# Patient Record
Sex: FEMALE | Race: WHITE | Employment: OTHER | URBAN - METROPOLITAN AREA
[De-identification: names, ages, dates, MRNs, and addresses within clinical notes are randomized per-mention and may not be internally consistent; named-entity substitution may affect disease eponyms.]

---

## 2017-01-30 RX ORDER — LISINOPRIL 5 MG/1
TABLET ORAL
Qty: 90 TABLET | Refills: 1 | Status: SHIPPED | OUTPATIENT
Start: 2017-01-30 | End: 2017-07-29 | Stop reason: SDUPTHER

## 2017-01-30 RX ORDER — SIMVASTATIN 40 MG
TABLET ORAL
Qty: 90 TABLET | Refills: 1 | Status: SHIPPED | OUTPATIENT
Start: 2017-01-30 | End: 2017-07-29 | Stop reason: SDUPTHER

## 2017-03-27 RX ORDER — METOPROLOL TARTRATE 50 MG/1
TABLET, FILM COATED ORAL
Qty: 90 TABLET | Refills: 0 | Status: SHIPPED | OUTPATIENT
Start: 2017-03-27 | End: 2017-06-25 | Stop reason: SDUPTHER

## 2017-04-20 RX ORDER — AMLODIPINE BESYLATE 5 MG/1
TABLET ORAL
Qty: 90 TABLET | Refills: 1 | Status: SHIPPED | OUTPATIENT
Start: 2017-04-20 | End: 2017-10-17 | Stop reason: SDUPTHER

## 2017-05-25 ENCOUNTER — HOSPITAL ENCOUNTER (OUTPATIENT)
Age: 67
Setting detail: SPECIMEN
Discharge: HOME | End: 2017-05-25
Payer: MEDICARE

## 2017-05-25 LAB
HEPATITIS C ANTIBODY: NONREACTIVE
HIV AG/AB: NONREACTIVE

## 2017-06-06 ENCOUNTER — OFFICE VISIT (OUTPATIENT)
Dept: FAMILY MEDICINE CLINIC | Age: 67
End: 2017-06-06
Payer: MEDICARE

## 2017-06-06 VITALS
WEIGHT: 212 LBS | SYSTOLIC BLOOD PRESSURE: 110 MMHG | RESPIRATION RATE: 16 BRPM | BODY MASS INDEX: 37.56 KG/M2 | HEIGHT: 63 IN | HEART RATE: 68 BPM | DIASTOLIC BLOOD PRESSURE: 76 MMHG

## 2017-06-06 DIAGNOSIS — D69.6 THROMBOCYTOPENIA (HCC): ICD-10-CM

## 2017-06-06 DIAGNOSIS — R42 DIZZINESS: ICD-10-CM

## 2017-06-06 DIAGNOSIS — R73.01 IMPAIRED FASTING BLOOD SUGAR: ICD-10-CM

## 2017-06-06 DIAGNOSIS — I10 ESSENTIAL HYPERTENSION: Primary | ICD-10-CM

## 2017-06-06 DIAGNOSIS — Z00.00 MEDICARE ANNUAL WELLNESS VISIT, INITIAL: ICD-10-CM

## 2017-06-06 DIAGNOSIS — E78.2 MIXED HYPERLIPIDEMIA: ICD-10-CM

## 2017-06-06 PROCEDURE — G0438 PPPS, INITIAL VISIT: HCPCS | Performed by: FAMILY MEDICINE

## 2017-06-06 PROCEDURE — 99214 OFFICE O/P EST MOD 30 MIN: CPT | Performed by: FAMILY MEDICINE

## 2017-06-06 RX ORDER — PREDNISONE 20 MG/1
TABLET ORAL
Qty: 10 TABLET | Refills: 0 | Status: SHIPPED | OUTPATIENT
Start: 2017-06-06 | End: 2018-03-06 | Stop reason: ALTCHOICE

## 2017-06-06 ASSESSMENT — PATIENT HEALTH QUESTIONNAIRE - PHQ9: SUM OF ALL RESPONSES TO PHQ QUESTIONS 1-9: 1

## 2017-06-06 ASSESSMENT — ANXIETY QUESTIONNAIRES: GAD7 TOTAL SCORE: 0

## 2017-06-11 PROBLEM — Z00.00 MEDICARE ANNUAL WELLNESS VISIT, INITIAL: Status: ACTIVE | Noted: 2017-06-11

## 2017-06-11 ASSESSMENT — ENCOUNTER SYMPTOMS
SINUS PRESSURE: 0
SHORTNESS OF BREATH: 0
SORE THROAT: 0
RHINORRHEA: 0
DIARRHEA: 0
COUGH: 0
EYE DISCHARGE: 0
CONSTIPATION: 0
WHEEZING: 0
EYE REDNESS: 0
TROUBLE SWALLOWING: 0
NAUSEA: 0
ABDOMINAL PAIN: 0
VOMITING: 0

## 2017-06-19 RX ORDER — DICLOFENAC SODIUM AND MISOPROSTOL 75; 200 MG/1; UG/1
TABLET, DELAYED RELEASE ORAL
Qty: 180 TABLET | Refills: 1 | Status: SHIPPED | OUTPATIENT
Start: 2017-06-19 | End: 2021-05-07 | Stop reason: SDUPTHER

## 2017-06-25 DIAGNOSIS — I10 ESSENTIAL HYPERTENSION: Primary | ICD-10-CM

## 2017-06-26 RX ORDER — METOPROLOL TARTRATE 50 MG/1
TABLET, FILM COATED ORAL
Qty: 90 TABLET | Refills: 1 | Status: SHIPPED | OUTPATIENT
Start: 2017-06-26 | End: 2017-12-23 | Stop reason: SDUPTHER

## 2017-07-31 RX ORDER — SIMVASTATIN 40 MG
TABLET ORAL
Qty: 90 TABLET | Refills: 1 | Status: SHIPPED | OUTPATIENT
Start: 2017-07-31 | End: 2018-01-27 | Stop reason: SDUPTHER

## 2017-07-31 RX ORDER — LISINOPRIL 5 MG/1
TABLET ORAL
Qty: 90 TABLET | Refills: 1 | Status: SHIPPED | OUTPATIENT
Start: 2017-07-31 | End: 2018-01-27 | Stop reason: SDUPTHER

## 2017-10-17 RX ORDER — AMLODIPINE BESYLATE 5 MG/1
TABLET ORAL
Qty: 90 TABLET | Refills: 1 | Status: SHIPPED | OUTPATIENT
Start: 2017-10-17 | End: 2018-04-15 | Stop reason: SDUPTHER

## 2017-12-18 NOTE — TELEPHONE ENCOUNTER
Cecilia Bryant called requesting a refill of the below medication which has been pended for you: patient has plenty, and does not want refilled at this time.  Will call in after the first of the year to reschedule appt;.    Requested Prescriptions     Pending Prescriptions Disp Refills    diclofenac-Misoprostol (ARTHROTEC 75) 75-0.2 MG per tablet [Pharmacy Med Name: DICLOFENAC/MISOPROSTOL TB 60S 75/.2MG] 180 tablet 1     Sig: TAKE 1 TABLET TWICE A DAY       Last Appointment Date: 6/6/2017  Next Appointment Date:     No Known Allergies

## 2017-12-19 RX ORDER — DICLOFENAC SODIUM AND MISOPROSTOL 75; 200 MG/1; UG/1
TABLET, DELAYED RELEASE ORAL
Qty: 180 TABLET | Refills: 1 | OUTPATIENT
Start: 2017-12-19

## 2017-12-23 DIAGNOSIS — I10 ESSENTIAL HYPERTENSION: ICD-10-CM

## 2017-12-26 RX ORDER — METOPROLOL TARTRATE 50 MG/1
TABLET, FILM COATED ORAL
Qty: 90 TABLET | Refills: 1 | Status: SHIPPED | OUTPATIENT
Start: 2017-12-26 | End: 2018-06-21 | Stop reason: SDUPTHER

## 2017-12-26 NOTE — TELEPHONE ENCOUNTER
Srinivasan Tian called requesting a refill of the below medication which has been pended for you:     Requested Prescriptions     Pending Prescriptions Disp Refills    metoprolol tartrate (LOPRESSOR) 50 MG tablet [Pharmacy Med Name: METOPROLOL TARTRATE TABS 50MG] 90 tablet 1     Sig: TAKE 1 TABLET DAILY       Last Appointment Date: 6/6/2017  Next Appointment Date: is going to schedule after the first of the year.     No Known Allergies

## 2018-01-23 ENCOUNTER — HOSPITAL ENCOUNTER (OUTPATIENT)
Dept: LAB | Age: 68
Setting detail: SPECIMEN
Discharge: HOME | End: 2018-01-23
Payer: MEDICARE

## 2018-01-23 DIAGNOSIS — R73.01 IMPAIRED FASTING BLOOD SUGAR: ICD-10-CM

## 2018-01-23 DIAGNOSIS — E78.2 MIXED HYPERLIPIDEMIA: ICD-10-CM

## 2018-01-23 DIAGNOSIS — I10 ESSENTIAL HYPERTENSION: ICD-10-CM

## 2018-01-23 LAB
ABSOLUTE EOS #: 0.1 K/UL (ref 0–0.4)
ABSOLUTE IMMATURE GRANULOCYTE: ABNORMAL K/UL (ref 0–0.3)
ABSOLUTE LYMPH #: 1.1 K/UL (ref 1–4.8)
ABSOLUTE MONO #: 0.4 K/UL (ref 0.1–1.2)
ALBUMIN SERPL-MCNC: 3.9 G/DL (ref 3.5–5.2)
ALBUMIN/GLOBULIN RATIO: 1.4 (ref 1–2.5)
ALP BLD-CCNC: 67 U/L (ref 35–104)
ALT SERPL-CCNC: 46 U/L (ref 5–33)
ANION GAP SERPL CALCULATED.3IONS-SCNC: 12 MMOL/L (ref 9–17)
AST SERPL-CCNC: 31 U/L
BASOPHILS # BLD: 1 % (ref 0–1)
BASOPHILS ABSOLUTE: 0 K/UL (ref 0–0.2)
BILIRUB SERPL-MCNC: 0.48 MG/DL (ref 0.3–1.2)
BUN BLDV-MCNC: 15 MG/DL (ref 8–23)
BUN/CREAT BLD: 26 (ref 9–20)
CALCIUM SERPL-MCNC: 9.6 MG/DL (ref 8.6–10.4)
CHLORIDE BLD-SCNC: 102 MMOL/L (ref 98–107)
CHOLESTEROL/HDL RATIO: 6.9
CHOLESTEROL: 193 MG/DL
CO2: 27 MMOL/L (ref 20–31)
CREAT SERPL-MCNC: 0.58 MG/DL (ref 0.5–0.9)
DIFFERENTIAL TYPE: ABNORMAL
EOSINOPHILS RELATIVE PERCENT: 2 % (ref 1–7)
ESTIMATED AVERAGE GLUCOSE: 111 MG/DL
GFR AFRICAN AMERICAN: >60 ML/MIN
GFR NON-AFRICAN AMERICAN: >60 ML/MIN
GFR SERPL CREATININE-BSD FRML MDRD: ABNORMAL ML/MIN/{1.73_M2}
GFR SERPL CREATININE-BSD FRML MDRD: ABNORMAL ML/MIN/{1.73_M2}
GLUCOSE BLD-MCNC: 102 MG/DL (ref 70–99)
HBA1C MFR BLD: 5.5 % (ref 4.8–5.9)
HCT VFR BLD CALC: 46.5 % (ref 36–46)
HDLC SERPL-MCNC: 28 MG/DL
HEMOGLOBIN: 15.5 G/DL (ref 12–16)
IMMATURE GRANULOCYTES: ABNORMAL %
LDL CHOLESTEROL DIRECT: 112 MG/DL
LDL CHOLESTEROL: ABNORMAL MG/DL (ref 0–130)
LYMPHOCYTES # BLD: 26 % (ref 16–46)
MCH RBC QN AUTO: 30.4 PG (ref 26–34)
MCHC RBC AUTO-ENTMCNC: 33.4 G/DL (ref 31–37)
MCV RBC AUTO: 90.8 FL (ref 80–100)
MONOCYTES # BLD: 9 % (ref 4–11)
NRBC AUTOMATED: ABNORMAL PER 100 WBC
PDW BLD-RTO: 13 % (ref 11–14.5)
PLATELET # BLD: 134 K/UL (ref 140–450)
PLATELET ESTIMATE: ABNORMAL
PMV BLD AUTO: 9.2 FL (ref 6–12)
POTASSIUM SERPL-SCNC: 4.7 MMOL/L (ref 3.7–5.3)
RBC # BLD: 5.12 M/UL (ref 4–5.2)
RBC # BLD: ABNORMAL 10*6/UL
SEG NEUTROPHILS: 62 % (ref 43–77)
SEGMENTED NEUTROPHILS ABSOLUTE COUNT: 2.5 K/UL (ref 1.8–7.7)
SODIUM BLD-SCNC: 141 MMOL/L (ref 135–144)
TOTAL PROTEIN: 6.7 G/DL (ref 6.4–8.3)
TRIGL SERPL-MCNC: 449 MG/DL
VLDLC SERPL CALC-MCNC: ABNORMAL MG/DL (ref 1–30)
WBC # BLD: 4.1 K/UL (ref 3.5–11)
WBC # BLD: ABNORMAL 10*3/UL

## 2018-01-23 PROCEDURE — 36415 COLL VENOUS BLD VENIPUNCTURE: CPT

## 2018-01-23 PROCEDURE — 80053 COMPREHEN METABOLIC PANEL: CPT

## 2018-01-23 PROCEDURE — 80061 LIPID PANEL: CPT

## 2018-01-23 PROCEDURE — 85025 COMPLETE CBC W/AUTO DIFF WBC: CPT

## 2018-01-23 PROCEDURE — 83721 ASSAY OF BLOOD LIPOPROTEIN: CPT

## 2018-01-23 PROCEDURE — 83036 HEMOGLOBIN GLYCOSYLATED A1C: CPT

## 2018-01-29 RX ORDER — SIMVASTATIN 40 MG
TABLET ORAL
Qty: 90 TABLET | Refills: 1 | Status: SHIPPED | OUTPATIENT
Start: 2018-01-29 | End: 2018-07-26 | Stop reason: SDUPTHER

## 2018-01-29 RX ORDER — LISINOPRIL 5 MG/1
TABLET ORAL
Qty: 90 TABLET | Refills: 1 | Status: SHIPPED | OUTPATIENT
Start: 2018-01-29 | End: 2018-07-26 | Stop reason: SDUPTHER

## 2018-03-06 ENCOUNTER — OFFICE VISIT (OUTPATIENT)
Dept: FAMILY MEDICINE CLINIC | Age: 68
End: 2018-03-06
Payer: MEDICARE

## 2018-03-06 VITALS
DIASTOLIC BLOOD PRESSURE: 84 MMHG | HEIGHT: 63 IN | RESPIRATION RATE: 16 BRPM | BODY MASS INDEX: 38.98 KG/M2 | HEART RATE: 68 BPM | SYSTOLIC BLOOD PRESSURE: 130 MMHG | WEIGHT: 220 LBS

## 2018-03-06 DIAGNOSIS — Z23 NEED FOR SHINGLES VACCINE: ICD-10-CM

## 2018-03-06 DIAGNOSIS — D69.6 THROMBOCYTOPENIA (HCC): ICD-10-CM

## 2018-03-06 DIAGNOSIS — Z12.31 SCREENING MAMMOGRAM, ENCOUNTER FOR: ICD-10-CM

## 2018-03-06 DIAGNOSIS — G47.33 OBSTRUCTIVE SLEEP APNEA: ICD-10-CM

## 2018-03-06 DIAGNOSIS — Z23 NEED FOR PNEUMOCOCCAL VACCINATION: ICD-10-CM

## 2018-03-06 DIAGNOSIS — R73.01 IMPAIRED FASTING BLOOD SUGAR: ICD-10-CM

## 2018-03-06 DIAGNOSIS — I10 ESSENTIAL HYPERTENSION: ICD-10-CM

## 2018-03-06 DIAGNOSIS — E78.2 MIXED HYPERLIPIDEMIA: Primary | ICD-10-CM

## 2018-03-06 PROCEDURE — G0009 ADMIN PNEUMOCOCCAL VACCINE: HCPCS | Performed by: FAMILY MEDICINE

## 2018-03-06 PROCEDURE — 99214 OFFICE O/P EST MOD 30 MIN: CPT | Performed by: FAMILY MEDICINE

## 2018-03-06 PROCEDURE — 90732 PPSV23 VACC 2 YRS+ SUBQ/IM: CPT | Performed by: FAMILY MEDICINE

## 2018-03-06 NOTE — PATIENT INSTRUCTIONS
Hospital Outpatient Visit on 01/23/2018   Component Date Value Ref Range Status    WBC 01/23/2018 4.1  3.5 - 11.0 k/uL Final    RBC 01/23/2018 5.12  4.0 - 5.2 m/uL Final    Hemoglobin 01/23/2018 15.5  12.0 - 16.0 g/dL Final    Hematocrit 01/23/2018 46.5* 36 - 46 % Final    MCV 01/23/2018 90.8  80 - 100 fL Final    MCH 01/23/2018 30.4  26 - 34 pg Final    MCHC 01/23/2018 33.4  31 - 37 g/dL Final    RDW 01/23/2018 13.0  11.0 - 14.5 % Final    Platelets 47/67/3209 134* 140 - 450 k/uL Final    MPV 01/23/2018 9.2  6.0 - 12.0 fL Final    NRBC Automated 01/23/2018 NOT REPORTED  per 100 WBC Final    Differential Type 01/23/2018 NOT REPORTED   Final    Immature Granulocytes 01/23/2018 NOT REPORTED  0 % Final    Absolute Immature Granulocyte 01/23/2018 NOT REPORTED  0.00 - 0.30 k/uL Final    WBC Morphology 01/23/2018 NOT REPORTED   Final    RBC Morphology 01/23/2018 NOT REPORTED   Final    Platelet Estimate 52/33/2597 NOT REPORTED   Final    Seg Neutrophils 01/23/2018 62  43 - 77 % Final    Lymphocytes 01/23/2018 26  16 - 46 % Final    Monocytes 01/23/2018 9  4 - 11 % Final    Eosinophils % 01/23/2018 2  1 - 7 % Final    Basophils 01/23/2018 1  0 - 1 % Final    Segs Absolute 01/23/2018 2.50  1.8 - 7.7 k/uL Final    Absolute Lymph # 01/23/2018 1.10  1.0 - 4.8 k/uL Final    Absolute Mono # 01/23/2018 0.40  0.1 - 1.2 k/uL Final    Absolute Eos # 01/23/2018 0.10  0.0 - 0.4 k/uL Final    Basophils # 01/23/2018 0.00  0.0 - 0.2 k/uL Final    Comment: [facility] [address] [facility] [address] [place] [id number] [phone]      Glucose 01/23/2018 102* 70 - 99 mg/dL Final    BUN 01/23/2018 15  8 - 23 mg/dL Final    CREATININE 01/23/2018 0.58  0.50 - 0.90 mg/dL Final    Bun/Cre Ratio 01/23/2018 26* 9 - 20 Final    Calcium 01/23/2018 9.6  8.6 - 10.4 mg/dL Final    Sodium 01/23/2018 141  135 - 144 mmol/L Final    Potassium 01/23/2018 4.7  3.7 - 5.3 mmol/L take one or more types of medicine to lower your blood pressure. Be safe with medicines. Take your medicine exactly as prescribed. Call your doctor if you think you are having a problem with your medicine. · Talk to your doctor before you start taking aspirin every day. Aspirin can help certain people lower their risk of a heart attack or stroke. But taking aspirin isn't right for everyone, because it can cause serious bleeding. · See your doctor regularly. You may need to see the doctor more often at first or until your blood pressure comes down. · If you are taking blood pressure medicine, talk to your doctor before you take decongestants or anti-inflammatory medicine, such as ibuprofen. Some of these medicines can raise blood pressure. · Learn how to check your blood pressure at home. Lifestyle changes  · Stay at a healthy weight. This is especially important if you put on weight around the waist. Losing even 10 pounds can help you lower your blood pressure. · If your doctor recommends it, get more exercise. Walking is a good choice. Bit by bit, increase the amount you walk every day. Try for at least 30 minutes on most days of the week. You also may want to swim, bike, or do other activities. · Avoid or limit alcohol. Talk to your doctor about whether you can drink any alcohol. · Try to limit how much sodium you eat to less than 2,300 milligrams (mg) a day. Your doctor may ask you to try to eat less than 1,500 mg a day. · Eat plenty of fruits (such as bananas and oranges), vegetables, legumes, whole grains, and low-fat dairy products. · Lower the amount of saturated fat in your diet. Saturated fat is found in animal products such as milk, cheese, and meat. Limiting these foods may help you lose weight and also lower your risk for heart disease. · Do not smoke. Smoking increases your risk for heart attack and stroke.  If you need help quitting, talk to your doctor about stop-smoking programs and vaccination. Sitting or lying down for about 15 minutes can help prevent fainting, and injuries caused by a fall. Tell your doctor if you feel dizzy, or have vision changes or ringing in the ears. · Some people get severe pain in the shoulder and have difficulty moving the arm where a shot was given. This happens very rarely. · Any medication can cause a severe allergic reaction. Such reactions from a vaccine are very rare, estimated at about 1 in a million doses, and would happen within a few minutes to a few hours after the vaccination. As with any medicine, there is a very remote chance of a vaccine causing a serious injury or death. The safety of vaccines is always being monitored. For more information, visit: www.cdc.gov/vaccinesafety/  What if there is a serious reaction? What should I look for? Look for anything that concerns you, such as signs of a severe allergic reaction, very high fever, or unusual behavior. Signs of a severe allergic reaction can include hives, swelling of the face and throat, difficulty breathing, a fast heartbeat, dizziness, and weakness. These would usually start a few minutes to a few hours after the vaccination. What should I do? If you think it is a severe allergic reaction or other emergency that can't wait, call 9-1-1 or get to the nearest hospital. Otherwise, call your doctor. Afterward, the reaction should be reported to the Vaccine Adverse Event Reporting System (VAERS). Your doctor might file this report, or you can do it yourself through the VAERS web site at www.vaers. hhs.gov, or by calling 6-502.101.3759. VAERS does not give medical advice. How can I learn more? · Ask your doctor. He or she can give you the vaccine package insert or suggest other sources of information. · Call your local or state health department.   · Contact the Centers for Disease Control and Prevention (CDC):  ¨ Call 5-985.291.5276 (1-800-CDC-INFO) or  ¨ Visit CDC's website at www.cdc.gov/vaccines  Vaccine Information Statement  PPSV Vaccine  (04/24/2015)  Department of Health and Human Services  Centers for Disease Control and Prevention  Many Vaccine Information Statements are available in [place] and other languages. See www.immunize.org/vis. Hojas de información Sobre Vacunas están disponibles en español y en muchos otros idiomas. Visite \[email or web address]. Care instructions adapted under license by [facility] If you have questions about a medical condition or this instruction, always ask your healthcare professional. [address] any warranty or liability for your use of this information.

## 2018-03-14 ASSESSMENT — ENCOUNTER SYMPTOMS
NAUSEA: 0
SHORTNESS OF BREATH: 0
DIARRHEA: 0
EYE REDNESS: 0
RHINORRHEA: 0
CONSTIPATION: 0
TROUBLE SWALLOWING: 0
SORE THROAT: 0
EYE DISCHARGE: 0
VOMITING: 0
WHEEZING: 0
ABDOMINAL PAIN: 0
COUGH: 0
SINUS PRESSURE: 0

## 2018-03-14 NOTE — PROGRESS NOTES
wear a CPAP mask. Is continuing to get ongoing medical benefit from this. Does have known thrombocytopenia and her platelet count is just marginally low. This is relatively stable without significant change since her last check. She has not had any issues with bleeding. Patient otherwise has no other acute medical concerns.   Patient's recent lab reports are as follows:    Results for orders placed or performed during the hospital encounter of 01/23/18   CBC Auto Differential   Result Value Ref Range    WBC 4.1 3.5 - 11.0 k/uL    RBC 5.12 4.0 - 5.2 m/uL    Hemoglobin 15.5 12.0 - 16.0 g/dL    Hematocrit 46.5 (H) 36 - 46 %    MCV 90.8 80 - 100 fL    MCH 30.4 26 - 34 pg    MCHC 33.4 31 - 37 g/dL    RDW 13.0 11.0 - 14.5 %    Platelets 278 (L) 941 - 450 k/uL    MPV 9.2 6.0 - 12.0 fL    NRBC Automated NOT REPORTED per 100 WBC    Differential Type NOT REPORTED     Immature Granulocytes NOT REPORTED 0 %    Absolute Immature Granulocyte NOT REPORTED 0.00 - 0.30 k/uL    WBC Morphology NOT REPORTED     RBC Morphology NOT REPORTED     Platelet Estimate NOT REPORTED     Seg Neutrophils 62 43 - 77 %    Lymphocytes 26 16 - 46 %    Monocytes 9 4 - 11 %    Eosinophils % 2 1 - 7 %    Basophils 1 0 - 1 %    Segs Absolute 2.50 1.8 - 7.7 k/uL    Absolute Lymph # 1.10 1.0 - 4.8 k/uL    Absolute Mono # 0.40 0.1 - 1.2 k/uL    Absolute Eos # 0.10 0.0 - 0.4 k/uL    Basophils # 0.00 0.0 - 0.2 k/uL   Comprehensive Metabolic Panel   Result Value Ref Range    Glucose 102 (H) 70 - 99 mg/dL    BUN 15 8 - 23 mg/dL    CREATININE 0.58 0.50 - 0.90 mg/dL    Bun/Cre Ratio 26 (H) 9 - 20    Calcium 9.6 8.6 - 10.4 mg/dL    Sodium 141 135 - 144 mmol/L    Potassium 4.7 3.7 - 5.3 mmol/L    Chloride 102 98 - 107 mmol/L    CO2 27 20 - 31 mmol/L    Anion Gap 12 9 - 17 mmol/L    Alkaline Phosphatase 67 35 - 104 U/L    ALT 46 (H) 5 - 33 U/L    AST 31 <32 U/L    Total Bilirubin 0.48 0.3 - 1.2 mg/dL    Total Protein 6.7 6.4 - 8.3 g/dL    Alb 3.9 3.5 - 5.2 g/dL Albumin/Globulin Ratio 1.4 1.0 - 2.5    GFR Non-African American >60 >60 mL/min    GFR African American >60 >60 mL/min    GFR Comment          GFR Staging NOT REPORTED    Lipid Panel   Result Value Ref Range    Cholesterol 193 <200 mg/dL    HDL 28 (L) >40 mg/dL    LDL Cholesterol      0 - 130 mg/dL    Chol/HDL Ratio 6.9 (H) <5    Triglycerides 449 (H) <150 mg/dL    VLDL NOT REPORTED 1 - 30 mg/dL   Hemoglobin A1C   Result Value Ref Range    Hemoglobin A1C 5.5 4.8 - 5.9 %    Estimated Avg Glucose 111 mg/dL   LDL Cholesterol, Direct   Result Value Ref Range    LDL Direct 112 (H) <100 mg/dL     Did discuss dietary modification and increased exercise to keep cholesterol and blood sugar under good control. Other review of systems are as noted below. Did review patient's med list, allergies, social history, fam history, pmhx and pshx today as noted in the record. Preventative measures are reviewed today. See health maintenance section for complete preventative plan of care. Review of Systems   Constitutional: Negative for chills, fatigue and fever. HENT: Negative for congestion, ear pain, postnasal drip, rhinorrhea, sinus pressure, sore throat and trouble swallowing. Eyes: Negative for discharge and redness. Respiratory: Negative for cough, shortness of breath and wheezing. Cardiovascular: Negative for chest pain. Gastrointestinal: Negative for abdominal pain, constipation, diarrhea, nausea and vomiting. Musculoskeletal: Negative for arthralgias, myalgias and neck pain. Skin: Negative for rash and wound. Allergic/Immunologic: Negative for environmental allergies. Neurological: Negative for dizziness, weakness, light-headedness and headaches. Hematological: Negative for adenopathy. Psychiatric/Behavioral: Negative. Objective:   Physical Exam   Constitutional: She is oriented to person, place, and time. She appears well-developed and well-nourished. No distress.    HENT:   Head:

## 2018-04-16 RX ORDER — AMLODIPINE BESYLATE 5 MG/1
TABLET ORAL
Qty: 90 TABLET | Refills: 1 | Status: SHIPPED | OUTPATIENT
Start: 2018-04-16 | End: 2018-10-13 | Stop reason: SDUPTHER

## 2018-04-26 ENCOUNTER — TELEPHONE (OUTPATIENT)
Dept: PRIMARY CARE CLINIC | Age: 68
End: 2018-04-26

## 2018-05-01 ENCOUNTER — HOSPITAL ENCOUNTER (OUTPATIENT)
Dept: MAMMOGRAPHY | Age: 68
Discharge: HOME | End: 2018-05-03
Payer: MEDICARE

## 2018-05-01 DIAGNOSIS — Z12.31 SCREENING MAMMOGRAM, ENCOUNTER FOR: ICD-10-CM

## 2018-05-01 PROCEDURE — 77063 BREAST TOMOSYNTHESIS BI: CPT

## 2018-06-21 DIAGNOSIS — I10 ESSENTIAL HYPERTENSION: ICD-10-CM

## 2018-06-21 RX ORDER — METOPROLOL TARTRATE 50 MG/1
TABLET, FILM COATED ORAL
Qty: 90 TABLET | Refills: 1 | Status: SHIPPED | OUTPATIENT
Start: 2018-06-21 | End: 2018-12-18 | Stop reason: SDUPTHER

## 2018-07-03 ENCOUNTER — TELEPHONE (OUTPATIENT)
Dept: FAMILY MEDICINE CLINIC | Age: 68
End: 2018-07-03

## 2018-07-03 NOTE — TELEPHONE ENCOUNTER
Patient cap on cpap resevoir cracked and she is needing a script faxed for cpap supplies to [facility] for insurance to cover this. Please send script and last office visit note from Dr Gumaro Kat to [facility] [phone]. Script on your desk to sign.

## 2018-07-26 RX ORDER — SIMVASTATIN 40 MG
TABLET ORAL
Qty: 90 TABLET | Refills: 1 | Status: SHIPPED | OUTPATIENT
Start: 2018-07-26 | End: 2019-01-21 | Stop reason: SINTOL

## 2018-07-26 RX ORDER — LISINOPRIL 5 MG/1
TABLET ORAL
Qty: 90 TABLET | Refills: 1 | Status: SHIPPED | OUTPATIENT
Start: 2018-07-26 | End: 2019-01-22 | Stop reason: SDUPTHER

## 2018-07-26 NOTE — TELEPHONE ENCOUNTER
Hoyt Romberg called requesting a refill of the below medication which has been pended for you:     Requested Prescriptions     Pending Prescriptions Disp Refills    lisinopril (PRINIVIL;ZESTRIL) 5 MG tablet [Pharmacy Med Name: LISINOPRIL TABS 5MG] 90 tablet 1     Sig: TAKE 1 TABLET DAILY    simvastatin (ZOCOR) 40 MG tablet [Pharmacy Med Name: SIMVASTATIN TABS 40MG] 90 tablet 1     Sig: TAKE 1 TABLET NIGHTLY       Last Appointment Date: 3/6/2018  Next Appointment Date: 9/7/2018    No Known Allergies

## 2018-08-30 ENCOUNTER — HOSPITAL ENCOUNTER (OUTPATIENT)
Dept: LAB | Age: 68
Setting detail: SPECIMEN
Discharge: HOME | End: 2018-08-30
Payer: MEDICARE

## 2018-08-30 DIAGNOSIS — E78.2 MIXED HYPERLIPIDEMIA: ICD-10-CM

## 2018-08-30 DIAGNOSIS — I10 ESSENTIAL HYPERTENSION: ICD-10-CM

## 2018-08-30 DIAGNOSIS — R73.01 IMPAIRED FASTING BLOOD SUGAR: ICD-10-CM

## 2018-08-30 LAB
ABSOLUTE EOS #: 0.1 K/UL (ref 0–0.4)
ABSOLUTE IMMATURE GRANULOCYTE: ABNORMAL K/UL (ref 0–0.3)
ABSOLUTE LYMPH #: 1.1 K/UL (ref 1–4.8)
ABSOLUTE MONO #: 0.3 K/UL (ref 0.1–1.2)
ALBUMIN SERPL-MCNC: 4.1 G/DL (ref 3.5–5.2)
ALBUMIN/GLOBULIN RATIO: 1.5 (ref 1–2.5)
ALP BLD-CCNC: 70 U/L (ref 35–104)
ALT SERPL-CCNC: 32 U/L (ref 5–33)
ANION GAP SERPL CALCULATED.3IONS-SCNC: 11 MMOL/L (ref 9–17)
AST SERPL-CCNC: 19 U/L
BASOPHILS # BLD: 1 % (ref 0–1)
BASOPHILS ABSOLUTE: 0 K/UL (ref 0–0.2)
BILIRUB SERPL-MCNC: 0.36 MG/DL (ref 0.3–1.2)
BUN BLDV-MCNC: 11 MG/DL (ref 8–23)
BUN/CREAT BLD: 18 (ref 9–20)
C-PEPTIDE: 4 NG/ML (ref 1.1–4.4)
CALCIUM SERPL-MCNC: 9.7 MG/DL (ref 8.6–10.4)
CHLORIDE BLD-SCNC: 103 MMOL/L (ref 98–107)
CHOLESTEROL/HDL RATIO: 7.3
CHOLESTEROL: 196 MG/DL
CO2: 29 MMOL/L (ref 20–31)
CREAT SERPL-MCNC: 0.6 MG/DL (ref 0.5–0.9)
DIFFERENTIAL TYPE: ABNORMAL
EOSINOPHILS RELATIVE PERCENT: 2 % (ref 1–7)
ESTIMATED AVERAGE GLUCOSE: 105 MG/DL
GFR AFRICAN AMERICAN: >60 ML/MIN
GFR NON-AFRICAN AMERICAN: >60 ML/MIN
GFR SERPL CREATININE-BSD FRML MDRD: NORMAL ML/MIN/{1.73_M2}
GFR SERPL CREATININE-BSD FRML MDRD: NORMAL ML/MIN/{1.73_M2}
GLUCOSE BLD-MCNC: 94 MG/DL (ref 70–99)
HBA1C MFR BLD: 5.3 % (ref 4.8–5.9)
HCT VFR BLD CALC: 45 % (ref 36–46)
HDLC SERPL-MCNC: 27 MG/DL
HEMOGLOBIN: 15.1 G/DL (ref 12–16)
IMMATURE GRANULOCYTES: ABNORMAL %
LDL CHOLESTEROL DIRECT: 78 MG/DL
LDL CHOLESTEROL: ABNORMAL MG/DL (ref 0–130)
LYMPHOCYTES # BLD: 30 % (ref 16–46)
MCH RBC QN AUTO: 30.6 PG (ref 26–34)
MCHC RBC AUTO-ENTMCNC: 33.5 G/DL (ref 31–37)
MCV RBC AUTO: 91.4 FL (ref 80–100)
MONOCYTES # BLD: 9 % (ref 4–11)
NRBC AUTOMATED: ABNORMAL PER 100 WBC
PDW BLD-RTO: 12.5 % (ref 11–14.5)
PLATELET # BLD: 131 K/UL (ref 140–450)
PLATELET ESTIMATE: ABNORMAL
PMV BLD AUTO: 9.6 FL (ref 6–12)
POTASSIUM SERPL-SCNC: 4.4 MMOL/L (ref 3.7–5.3)
RBC # BLD: 4.93 M/UL (ref 4–5.2)
RBC # BLD: ABNORMAL 10*6/UL
SEG NEUTROPHILS: 58 % (ref 43–77)
SEGMENTED NEUTROPHILS ABSOLUTE COUNT: 2.2 K/UL (ref 1.8–7.7)
SODIUM BLD-SCNC: 143 MMOL/L (ref 135–144)
TOTAL PROTEIN: 6.9 G/DL (ref 6.4–8.3)
TRIGL SERPL-MCNC: 584 MG/DL
TSH SERPL DL<=0.05 MIU/L-ACNC: 0.84 MIU/L (ref 0.3–5)
VLDLC SERPL CALC-MCNC: ABNORMAL MG/DL (ref 1–30)
WBC # BLD: 3.7 K/UL (ref 3.5–11)
WBC # BLD: ABNORMAL 10*3/UL

## 2018-08-30 PROCEDURE — 80061 LIPID PANEL: CPT

## 2018-08-30 PROCEDURE — 80053 COMPREHEN METABOLIC PANEL: CPT

## 2018-08-30 PROCEDURE — 83721 ASSAY OF BLOOD LIPOPROTEIN: CPT

## 2018-08-30 PROCEDURE — 83036 HEMOGLOBIN GLYCOSYLATED A1C: CPT

## 2018-08-30 PROCEDURE — 85025 COMPLETE CBC W/AUTO DIFF WBC: CPT

## 2018-08-30 PROCEDURE — 84681 ASSAY OF C-PEPTIDE: CPT

## 2018-08-30 PROCEDURE — 84443 ASSAY THYROID STIM HORMONE: CPT

## 2018-08-30 PROCEDURE — 36415 COLL VENOUS BLD VENIPUNCTURE: CPT

## 2018-09-07 ENCOUNTER — OFFICE VISIT (OUTPATIENT)
Dept: FAMILY MEDICINE CLINIC | Age: 68
End: 2018-09-07
Payer: MEDICARE

## 2018-09-07 VITALS
BODY MASS INDEX: 36.68 KG/M2 | DIASTOLIC BLOOD PRESSURE: 80 MMHG | HEART RATE: 68 BPM | HEIGHT: 63 IN | OXYGEN SATURATION: 95 % | WEIGHT: 207 LBS | RESPIRATION RATE: 16 BRPM | SYSTOLIC BLOOD PRESSURE: 120 MMHG

## 2018-09-07 DIAGNOSIS — Z00.00 ROUTINE GENERAL MEDICAL EXAMINATION AT A HEALTH CARE FACILITY: ICD-10-CM

## 2018-09-07 DIAGNOSIS — I10 ESSENTIAL HYPERTENSION: Primary | ICD-10-CM

## 2018-09-07 DIAGNOSIS — Z23 NEED FOR INFLUENZA VACCINATION: ICD-10-CM

## 2018-09-07 DIAGNOSIS — D69.6 THROMBOCYTOPENIA (HCC): ICD-10-CM

## 2018-09-07 DIAGNOSIS — R73.01 IMPAIRED FASTING BLOOD SUGAR: ICD-10-CM

## 2018-09-07 DIAGNOSIS — Z00.00 MEDICARE ANNUAL WELLNESS VISIT, SUBSEQUENT: ICD-10-CM

## 2018-09-07 DIAGNOSIS — E78.2 MIXED HYPERLIPIDEMIA: ICD-10-CM

## 2018-09-07 PROCEDURE — G0439 PPPS, SUBSEQ VISIT: HCPCS | Performed by: FAMILY MEDICINE

## 2018-09-07 PROCEDURE — 90662 IIV NO PRSV INCREASED AG IM: CPT | Performed by: FAMILY MEDICINE

## 2018-09-07 PROCEDURE — 99214 OFFICE O/P EST MOD 30 MIN: CPT | Performed by: FAMILY MEDICINE

## 2018-09-07 PROCEDURE — G0008 ADMIN INFLUENZA VIRUS VAC: HCPCS | Performed by: FAMILY MEDICINE

## 2018-09-07 RX ORDER — TRIAMCINOLONE ACETONIDE 1 MG/G
CREAM TOPICAL 2 TIMES DAILY
Qty: 80 G | Refills: 1 | Status: SHIPPED | OUTPATIENT
Start: 2018-09-07 | End: 2019-02-18

## 2018-09-07 ASSESSMENT — LIFESTYLE VARIABLES: HOW OFTEN DO YOU HAVE A DRINK CONTAINING ALCOHOL: 0

## 2018-09-07 ASSESSMENT — PATIENT HEALTH QUESTIONNAIRE - PHQ9
SUM OF ALL RESPONSES TO PHQ QUESTIONS 1-9: 0
SUM OF ALL RESPONSES TO PHQ QUESTIONS 1-9: 0

## 2018-09-07 ASSESSMENT — ANXIETY QUESTIONNAIRES: GAD7 TOTAL SCORE: 0

## 2018-09-07 NOTE — PATIENT INSTRUCTIONS
mmol/L Final    Alkaline Phosphatase 08/30/2018 70  35 - 104 U/L Final    ALT 08/30/2018 32  5 - 33 U/L Final    AST 08/30/2018 19  <32 U/L Final    Total Bilirubin 08/30/2018 0.36  0.3 - 1.2 mg/dL Final    Total Protein 08/30/2018 6.9  6.4 - 8.3 g/dL Final    Alb 08/30/2018 4.1  3.5 - 5.2 g/dL Final    Albumin/Globulin Ratio 08/30/2018 1.5  1.0 - 2.5 Final    GFR Non- 08/30/2018 >60  >60 mL/min Final    GFR  08/30/2018 >60  >60 mL/min Final    GFR Comment 08/30/2018        Final    Comment: Average GFR for 61-76 years old:   80 mL/min/1.73sq m  Chronic Kidney Disease:   <60 mL/min/1.73sq m  Kidney failure:   <15 mL/min/1.73sq m              eGFR calculated using average adult body mass. Additional eGFR calculator   available at:        [email or web address]            GFR Staging 08/30/2018 NOT REPORTED   Final    Cholesterol 08/30/2018 196  <200 mg/dL Final    Comment:    Cholesterol Guidelines:      <200  Desirable   200-240  Borderline      >240  Undesirable         HDL 08/30/2018 27* >40 mg/dL Final    Comment:    HDL Guidelines:    <40     Undesirable   40-59    Borderline    >59     Desirable         LDL Cholesterol 08/30/2018       0 - 130 mg/dL Final    Comment: Calculation not valid for Triglyceride value greater than 400 mg/dL. Direct LDL reflexed           LDL Guidelines:     <100    Desirable   100-129   Near to/above Desirable   130-159   Borderline      >159   Undesirable     Direct (measured) LDL and calculated LDL are not interchangeable tests.  Chol/HDL Ratio 08/30/2018 7.3* <5 Final            Triglycerides 08/30/2018 584* <150 mg/dL Final    Comment:    Triglyceride Guidelines:     <150   Desirable   150-199  Borderline   200-499  High     >499   Very high   Based on AHA Guidelines for fasting triglyceride, October 2012.          VLDL 08/30/2018 NOT REPORTED  1 - 30 mg/dL Final    Hemoglobin A1C 08/30/2018 5.3  4.8 - wear a seat belt when traveling in a car. Always wear a helmet when riding a bicycle or motorcycle.

## 2018-09-07 NOTE — PROGRESS NOTES
Have you had an allergic reaction to the flu shot? N     Are you allergic to eggs? N    Do you have a history of Guillain-[place] Syndrome which is a neurological disorder?  Teodora Mena

## 2018-09-07 NOTE — PROGRESS NOTES
Medicare Annual Wellness Visit  Name: Johnie Lopez Date: 2018   MRN: [id number] Sex: Female   Age: 80 y.o. Ethnicity: Non-/Non    : 1950 Race: Vickie Jones is here for Medicare AWV (subsequent; last 17); Hypertension (6 mo f/u); Hyperlipidemia (6 mo f/u); Blood Sugar Problem (IFG; 6 mo f/u); Arthritis (OA; 6 mo f/u); Sleep Apnea (DERIAN; 6 mo f/u); and Discuss Labs (drawn 18)    Screenings for behavioral, psychosocial and functional/safety risks, and cognitive dysfunction are all negative except as indicated below. These results, as well as other patient data from the [address] form, are documented in Flowsheets linked to this Encounter. No Known Allergies    Prior to Visit Medications    Medication Sig Taking? Authorizing Provider   lisinopril (PRINIVIL;ZESTRIL) 5 MG tablet TAKE 1 TABLET DAILY Yes Louellen Sor, DO   simvastatin (ZOCOR) 40 MG tablet TAKE 1 TABLET NIGHTLY Yes Louellen Sor, DO   metoprolol tartrate (LOPRESSOR) 50 MG tablet TAKE 1 TABLET DAILY Yes Deisrae Calhoun, DO   amLODIPine (NORVASC) 5 MG tablet TAKE 1 TABLET DAILY Yes Louellen Sor, DO   diclofenac-Misoprostol (ARTHROTEC 75) 75-0.2 MG per tablet TAKE 1 TABLET TWICE A DAY  Patient taking differently: 1 tab once daily Yes Louellen Sor, DO   Multiple Vitamins-Minerals (PRESERVISION AREDS 2 PO) Take 2 tablets by mouth daily Yes Historical Provider, MD       Past Medical History:   Diagnosis Date    Abnormal liver function tests     Mildly.  Hyperlipidemia     Hypertension     Stable.  Insulin resistance     Macular degeneration of left eye     Obstructive sleep apnea     Currently utilizing BiPAP and EPAP pressure 5, IPAP pressure 9.    Osteoarthritis      Past Surgical History:   Procedure Laterality Date    ABDOMINAL EXPLORATION SURGERY      With left salpingo-oophorectomy.     HYSTEROSCOPY  2005    With hysteroscopic resection minutes 2-3 times per week?: Yes  Have you lost any weight without trying in the past 3 months?: No  Do you eat fewer than 2 meals per day?: No  Have you seen a dentist within the past year?: Yes  Body mass index is 36.67 kg/m². Health Habits/Nutrition Interventions:  · None indicated    Safety:  Safety  Do you have working smoke detectors?: Yes  Have all throw rugs been removed or fastened?: Yes  Do you have non-slip mats in all bathtubs?: Yes  Do all of your stairways have a railing or banister?: (!) No  Are your doorways, halls and stairs free of clutter?: Yes  Do you always fasten your seatbelt when you are in a car?: Yes  Safety Interventions:  · Home safety tips provided    Personalized Preventive Plan   Current Health Maintenance Status  Immunization History   Administered Date(s) Administered    Influenza, High Dose (Fluzone 65 yrs and older) 12/01/2016, 10/21/2017    Pneumococcal 13-valent Conjugate ([id number]) 12/01/2016    Pneumococcal Polysaccharide ([id number]) 03/06/2018    Tdap (Boostrix, Adacel) 01/03/2017    Zoster Live (Zostavax) 01/21/2015    Zoster Subunit (Shingrix) 05/22/2018, 09/06/2018        Health Maintenance   Topic Date Due    DEXA (modify frequency per FRAX score)  Recommended, patient declined      Flu vaccine (1) 10/04/2018    A1C test (Diabetic or Prediabetic)  08/30/2019    Potassium monitoring  08/30/2019    Creatinine monitoring  08/30/2019    Breast cancer screen  05/01/2020    Colon cancer screen colonoscopy  06/22/2022    Lipid screen  08/30/2023    DTaP/Tdap/Td vaccine (2 - Td) 01/03/2027    Pneumococcal low/med risk  Completed    Shingles Vaccine  Completed    Hepatitis C screen  Completed     Recommendations for Preventive Services Due: see orders and patient instructions/AVS.  .   Recommended screening schedule for the next 5-10 years is provided to the patient in written form: see Patient Instructions/AVS.

## 2018-09-13 ASSESSMENT — ENCOUNTER SYMPTOMS
COUGH: 0
DIARRHEA: 0
VOMITING: 0
CONSTIPATION: 0
SHORTNESS OF BREATH: 0
WHEEZING: 0
EYE DISCHARGE: 0
NAUSEA: 0
SORE THROAT: 0
RHINORRHEA: 0
SINUS PRESSURE: 0
TROUBLE SWALLOWING: 0
ABDOMINAL PAIN: 0
EYE REDNESS: 0

## 2018-09-26 PROBLEM — Z00.00 MEDICARE ANNUAL WELLNESS VISIT, INITIAL: Status: RESOLVED | Noted: 2017-06-11 | Resolved: 2018-09-26

## 2018-10-07 PROBLEM — Z00.00 MEDICARE ANNUAL WELLNESS VISIT, SUBSEQUENT: Status: RESOLVED | Noted: 2018-09-07 | Resolved: 2018-10-07

## 2018-10-15 RX ORDER — AMLODIPINE BESYLATE 5 MG/1
TABLET ORAL
Qty: 90 TABLET | Refills: 1 | Status: SHIPPED | OUTPATIENT
Start: 2018-10-15 | End: 2019-04-13 | Stop reason: SDUPTHER

## 2018-10-15 NOTE — TELEPHONE ENCOUNTER
Prosper Medina called requesting a refill of the below medication which has been pended for you:     Requested Prescriptions     Pending Prescriptions Disp Refills    amLODIPine (NORVASC) 5 MG tablet [Pharmacy Med Name: AMLODIPINE BESYLATE TABS 5MG] 90 tablet 1     Sig: TAKE 1 TABLET DAILY       Last Appointment Date: 9/7/2018  Next Appointment Date: 3/7/2019    No Known Allergies

## 2018-12-18 DIAGNOSIS — I10 ESSENTIAL HYPERTENSION: ICD-10-CM

## 2018-12-18 RX ORDER — METOPROLOL TARTRATE 50 MG/1
TABLET, FILM COATED ORAL
Qty: 90 TABLET | Refills: 1 | Status: SHIPPED | OUTPATIENT
Start: 2018-12-18 | End: 2019-06-16 | Stop reason: SDUPTHER

## 2019-01-07 ENCOUNTER — OFFICE VISIT (OUTPATIENT)
Dept: FAMILY MEDICINE CLINIC | Age: 69
End: 2019-01-07
Payer: MEDICARE

## 2019-01-07 VITALS
HEART RATE: 64 BPM | RESPIRATION RATE: 16 BRPM | WEIGHT: 219 LBS | DIASTOLIC BLOOD PRESSURE: 88 MMHG | BODY MASS INDEX: 38.8 KG/M2 | SYSTOLIC BLOOD PRESSURE: 134 MMHG | HEIGHT: 63 IN

## 2019-01-07 DIAGNOSIS — M79.604 BILATERAL LOWER EXTREMITY PAIN: Primary | ICD-10-CM

## 2019-01-07 DIAGNOSIS — M79.605 BILATERAL LOWER EXTREMITY PAIN: Primary | ICD-10-CM

## 2019-01-07 PROCEDURE — 99213 OFFICE O/P EST LOW 20 MIN: CPT | Performed by: FAMILY MEDICINE

## 2019-01-07 ASSESSMENT — ENCOUNTER SYMPTOMS
RESPIRATORY NEGATIVE: 1
GASTROINTESTINAL NEGATIVE: 1
BACK PAIN: 1
EYES NEGATIVE: 1

## 2019-01-21 ENCOUNTER — TELEPHONE (OUTPATIENT)
Dept: FAMILY MEDICINE CLINIC | Age: 69
End: 2019-01-21

## 2019-01-21 RX ORDER — ROSUVASTATIN CALCIUM 10 MG/1
10 TABLET, COATED ORAL NIGHTLY
Qty: 30 TABLET | Refills: 1 | Status: SHIPPED | OUTPATIENT
Start: 2019-01-21 | End: 2019-01-22 | Stop reason: CLARIF

## 2019-01-22 ENCOUNTER — TELEPHONE (OUTPATIENT)
Dept: FAMILY MEDICINE CLINIC | Age: 69
End: 2019-01-22

## 2019-01-22 RX ORDER — ATORVASTATIN CALCIUM 10 MG/1
10 TABLET, FILM COATED ORAL DAILY
Qty: 30 TABLET | Refills: 1 | Status: SHIPPED | OUTPATIENT
Start: 2019-01-22 | End: 2019-03-18 | Stop reason: ALTCHOICE

## 2019-01-22 RX ORDER — LISINOPRIL 5 MG/1
TABLET ORAL
Qty: 90 TABLET | Refills: 1 | Status: SHIPPED | OUTPATIENT
Start: 2019-01-22 | End: 2019-05-16 | Stop reason: DRUGHIGH

## 2019-01-22 RX ORDER — SIMVASTATIN 40 MG
TABLET ORAL
Qty: 90 TABLET | Refills: 1 | OUTPATIENT
Start: 2019-01-22

## 2019-02-18 ENCOUNTER — OFFICE VISIT (OUTPATIENT)
Dept: FAMILY MEDICINE CLINIC | Age: 69
End: 2019-02-18
Payer: MEDICARE

## 2019-02-18 VITALS
BODY MASS INDEX: 38.73 KG/M2 | SYSTOLIC BLOOD PRESSURE: 128 MMHG | WEIGHT: 218.6 LBS | DIASTOLIC BLOOD PRESSURE: 82 MMHG | TEMPERATURE: 97.6 F | HEART RATE: 71 BPM | HEIGHT: 63 IN | OXYGEN SATURATION: 71 %

## 2019-02-18 DIAGNOSIS — J02.9 ACUTE VIRAL PHARYNGITIS: ICD-10-CM

## 2019-02-18 DIAGNOSIS — J01.90 ACUTE NON-RECURRENT SINUSITIS, UNSPECIFIED LOCATION: Primary | ICD-10-CM

## 2019-02-18 LAB — S PYO AG THROAT QL: NORMAL

## 2019-02-18 PROCEDURE — 99213 OFFICE O/P EST LOW 20 MIN: CPT | Performed by: FAMILY MEDICINE

## 2019-02-18 PROCEDURE — 87880 STREP A ASSAY W/OPTIC: CPT | Performed by: FAMILY MEDICINE

## 2019-02-18 RX ORDER — AZITHROMYCIN 250 MG/1
TABLET, FILM COATED ORAL
Qty: 1 PACKET | Refills: 0 | Status: SHIPPED | OUTPATIENT
Start: 2019-02-18 | End: 2019-03-18 | Stop reason: ALTCHOICE

## 2019-02-18 ASSESSMENT — PATIENT HEALTH QUESTIONNAIRE - PHQ9
SUM OF ALL RESPONSES TO PHQ QUESTIONS 1-9: 0
SUM OF ALL RESPONSES TO PHQ9 QUESTIONS 1 & 2: 0
1. LITTLE INTEREST OR PLEASURE IN DOING THINGS: 0
2. FEELING DOWN, DEPRESSED OR HOPELESS: 0
SUM OF ALL RESPONSES TO PHQ QUESTIONS 1-9: 0
1. LITTLE INTEREST OR PLEASURE IN DOING THINGS: 0
2. FEELING DOWN, DEPRESSED OR HOPELESS: 0
SUM OF ALL RESPONSES TO PHQ9 QUESTIONS 1 & 2: 0
SUM OF ALL RESPONSES TO PHQ QUESTIONS 1-9: 0
SUM OF ALL RESPONSES TO PHQ QUESTIONS 1-9: 0

## 2019-03-01 ENCOUNTER — HOSPITAL ENCOUNTER (OUTPATIENT)
Dept: LAB | Age: 69
Discharge: HOME | End: 2019-03-01
Payer: MEDICARE

## 2019-03-01 DIAGNOSIS — E78.2 MIXED HYPERLIPIDEMIA: ICD-10-CM

## 2019-03-01 DIAGNOSIS — R73.01 IMPAIRED FASTING BLOOD SUGAR: ICD-10-CM

## 2019-03-01 DIAGNOSIS — I10 ESSENTIAL HYPERTENSION: ICD-10-CM

## 2019-03-01 LAB
ABSOLUTE EOS #: 0.1 K/UL (ref 0–0.4)
ABSOLUTE IMMATURE GRANULOCYTE: ABNORMAL K/UL (ref 0–0.3)
ABSOLUTE LYMPH #: 1.1 K/UL (ref 1–4.8)
ABSOLUTE MONO #: 0.4 K/UL (ref 0.1–1.2)
ALBUMIN SERPL-MCNC: 4.1 G/DL (ref 3.5–5.2)
ALBUMIN/GLOBULIN RATIO: 1.4 (ref 1–2.5)
ALP BLD-CCNC: 77 U/L (ref 35–104)
ALT SERPL-CCNC: 33 U/L (ref 5–33)
ANION GAP SERPL CALCULATED.3IONS-SCNC: 12 MMOL/L (ref 9–17)
AST SERPL-CCNC: 21 U/L
BASOPHILS # BLD: 1 % (ref 0–1)
BASOPHILS ABSOLUTE: 0 K/UL (ref 0–0.2)
BILIRUB SERPL-MCNC: 0.41 MG/DL (ref 0.3–1.2)
BUN BLDV-MCNC: 20 MG/DL (ref 8–23)
BUN/CREAT BLD: 36 (ref 9–20)
CALCIUM SERPL-MCNC: 9.8 MG/DL (ref 8.6–10.4)
CHLORIDE BLD-SCNC: 105 MMOL/L (ref 98–107)
CHOLESTEROL/HDL RATIO: 7.7
CHOLESTEROL: 232 MG/DL
CO2: 26 MMOL/L (ref 20–31)
CREAT SERPL-MCNC: 0.56 MG/DL (ref 0.5–0.9)
DIFFERENTIAL TYPE: ABNORMAL
EOSINOPHILS RELATIVE PERCENT: 1 % (ref 1–7)
ESTIMATED AVERAGE GLUCOSE: 111 MG/DL
GFR AFRICAN AMERICAN: >60 ML/MIN
GFR NON-AFRICAN AMERICAN: >60 ML/MIN
GFR SERPL CREATININE-BSD FRML MDRD: ABNORMAL ML/MIN/{1.73_M2}
GFR SERPL CREATININE-BSD FRML MDRD: ABNORMAL ML/MIN/{1.73_M2}
GLUCOSE BLD-MCNC: 97 MG/DL (ref 70–99)
HBA1C MFR BLD: 5.5 % (ref 4.8–5.9)
HCT VFR BLD CALC: 47 % (ref 36–46)
HDLC SERPL-MCNC: 30 MG/DL
HEMOGLOBIN: 15.4 G/DL (ref 12–16)
IMMATURE GRANULOCYTES: ABNORMAL %
LDL CHOLESTEROL DIRECT: 103 MG/DL
LDL CHOLESTEROL: ABNORMAL MG/DL (ref 0–130)
LYMPHOCYTES # BLD: 19 % (ref 16–46)
MCH RBC QN AUTO: 29.7 PG (ref 26–34)
MCHC RBC AUTO-ENTMCNC: 32.8 G/DL (ref 31–37)
MCV RBC AUTO: 90.6 FL (ref 80–100)
MONOCYTES # BLD: 7 % (ref 4–11)
NRBC AUTOMATED: ABNORMAL PER 100 WBC
PDW BLD-RTO: 13.2 % (ref 11–14.5)
PLATELET # BLD: 142 K/UL (ref 140–450)
PLATELET ESTIMATE: ABNORMAL
PMV BLD AUTO: 8.7 FL (ref 6–12)
POTASSIUM SERPL-SCNC: 4.6 MMOL/L (ref 3.7–5.3)
RBC # BLD: 5.19 M/UL (ref 4–5.2)
RBC # BLD: ABNORMAL 10*6/UL
SEG NEUTROPHILS: 72 % (ref 43–77)
SEGMENTED NEUTROPHILS ABSOLUTE COUNT: 4.2 K/UL (ref 1.8–7.7)
SODIUM BLD-SCNC: 143 MMOL/L (ref 135–144)
TOTAL PROTEIN: 7 G/DL (ref 6.4–8.3)
TRIGL SERPL-MCNC: 577 MG/DL
VLDLC SERPL CALC-MCNC: ABNORMAL MG/DL (ref 1–30)
WBC # BLD: 5.9 K/UL (ref 3.5–11)
WBC # BLD: ABNORMAL 10*3/UL

## 2019-03-01 PROCEDURE — 36415 COLL VENOUS BLD VENIPUNCTURE: CPT

## 2019-03-01 PROCEDURE — 83036 HEMOGLOBIN GLYCOSYLATED A1C: CPT

## 2019-03-01 PROCEDURE — 83721 ASSAY OF BLOOD LIPOPROTEIN: CPT

## 2019-03-01 PROCEDURE — 85025 COMPLETE CBC W/AUTO DIFF WBC: CPT

## 2019-03-01 PROCEDURE — 80053 COMPREHEN METABOLIC PANEL: CPT

## 2019-03-01 PROCEDURE — 80061 LIPID PANEL: CPT

## 2019-03-18 ENCOUNTER — OFFICE VISIT (OUTPATIENT)
Dept: FAMILY MEDICINE CLINIC | Age: 69
End: 2019-03-18
Payer: MEDICARE

## 2019-03-18 VITALS
WEIGHT: 224 LBS | HEIGHT: 63 IN | HEART RATE: 68 BPM | DIASTOLIC BLOOD PRESSURE: 80 MMHG | BODY MASS INDEX: 39.69 KG/M2 | RESPIRATION RATE: 16 BRPM | SYSTOLIC BLOOD PRESSURE: 136 MMHG

## 2019-03-18 DIAGNOSIS — I10 ESSENTIAL HYPERTENSION: Primary | ICD-10-CM

## 2019-03-18 DIAGNOSIS — D69.6 THROMBOCYTOPENIA (HCC): ICD-10-CM

## 2019-03-18 DIAGNOSIS — Z12.31 SCREENING MAMMOGRAM, ENCOUNTER FOR: ICD-10-CM

## 2019-03-18 DIAGNOSIS — G47.33 OBSTRUCTIVE SLEEP APNEA: ICD-10-CM

## 2019-03-18 DIAGNOSIS — E78.2 MIXED HYPERLIPIDEMIA: ICD-10-CM

## 2019-03-18 DIAGNOSIS — J01.40 ACUTE NON-RECURRENT PANSINUSITIS: ICD-10-CM

## 2019-03-18 DIAGNOSIS — R73.01 IMPAIRED FASTING BLOOD SUGAR: ICD-10-CM

## 2019-03-18 PROCEDURE — G8510 SCR DEP NEG, NO PLAN REQD: HCPCS | Performed by: FAMILY MEDICINE

## 2019-03-18 PROCEDURE — 99214 OFFICE O/P EST MOD 30 MIN: CPT | Performed by: FAMILY MEDICINE

## 2019-03-18 RX ORDER — PRAVASTATIN SODIUM 40 MG
40 TABLET ORAL DAILY
Qty: 90 TABLET | Refills: 1 | Status: SHIPPED | OUTPATIENT
Start: 2019-03-18 | End: 2019-08-27 | Stop reason: SDUPTHER

## 2019-03-18 RX ORDER — AMOXICILLIN 875 MG/1
875 TABLET, COATED ORAL 2 TIMES DAILY
Qty: 20 TABLET | Refills: 0 | Status: SHIPPED | OUTPATIENT
Start: 2019-03-18 | End: 2019-09-26 | Stop reason: ALTCHOICE

## 2019-03-18 ASSESSMENT — PATIENT HEALTH QUESTIONNAIRE - PHQ9
1. LITTLE INTEREST OR PLEASURE IN DOING THINGS: 0
SUM OF ALL RESPONSES TO PHQ9 QUESTIONS 1 & 2: 0
SUM OF ALL RESPONSES TO PHQ QUESTIONS 1-9: 0
SUM OF ALL RESPONSES TO PHQ QUESTIONS 1-9: 0
2. FEELING DOWN, DEPRESSED OR HOPELESS: 0

## 2019-03-18 ASSESSMENT — ENCOUNTER SYMPTOMS
TROUBLE SWALLOWING: 0
RHINORRHEA: 1
SINUS PAIN: 1
NAUSEA: 0
ABDOMINAL PAIN: 0
VOMITING: 0
WHEEZING: 0
COUGH: 0
DIARRHEA: 0
SORE THROAT: 1
SINUS PRESSURE: 1
EYE DISCHARGE: 0
EYE REDNESS: 0
SHORTNESS OF BREATH: 0
CONSTIPATION: 0

## 2019-04-15 RX ORDER — AMLODIPINE BESYLATE 5 MG/1
TABLET ORAL
Qty: 90 TABLET | Refills: 1 | Status: SHIPPED | OUTPATIENT
Start: 2019-04-15 | End: 2019-10-12 | Stop reason: SDUPTHER

## 2019-04-15 NOTE — TELEPHONE ENCOUNTER
Turner Ackerman called requesting a refill of the below medication which has been pended for you:     Requested Prescriptions     Pending Prescriptions Disp Refills    amLODIPine (NORVASC) 5 MG tablet [Pharmacy Med Name: AMLODIPINE BESYLATE TABS 5MG] 90 tablet 1     Sig: TAKE 1 TABLET DAILY       Last Appointment Date: 3/18/2019  Next Appointment Date: 9/18/2019    Allergies   Allergen Reactions    Simvastatin Other (See Comments)     Muscle cramps in legs

## 2019-05-03 ENCOUNTER — HOSPITAL ENCOUNTER (OUTPATIENT)
Dept: MAMMOGRAPHY | Age: 69
Discharge: HOME | End: 2019-05-05
Payer: MEDICARE

## 2019-05-03 DIAGNOSIS — Z12.31 SCREENING MAMMOGRAM, ENCOUNTER FOR: ICD-10-CM

## 2019-05-03 PROCEDURE — 77063 BREAST TOMOSYNTHESIS BI: CPT

## 2019-05-06 DIAGNOSIS — R92.8 ABNORMAL MAMMOGRAM OF RIGHT BREAST: Primary | ICD-10-CM

## 2019-05-08 ENCOUNTER — HOSPITAL ENCOUNTER (OUTPATIENT)
Dept: ULTRASOUND IMAGING | Age: 69
Discharge: HOME | End: 2019-05-10
Payer: MEDICARE

## 2019-05-08 ENCOUNTER — HOSPITAL ENCOUNTER (OUTPATIENT)
Dept: MAMMOGRAPHY | Age: 69
Discharge: HOME | End: 2019-05-10
Payer: MEDICARE

## 2019-05-08 DIAGNOSIS — R92.8 ABNORMAL MAMMOGRAM OF RIGHT BREAST: ICD-10-CM

## 2019-05-08 PROCEDURE — G0279 TOMOSYNTHESIS, MAMMO: HCPCS

## 2019-05-08 PROCEDURE — 76642 ULTRASOUND BREAST LIMITED: CPT

## 2019-05-16 ENCOUNTER — TELEPHONE (OUTPATIENT)
Dept: FAMILY MEDICINE CLINIC | Age: 69
End: 2019-05-16

## 2019-05-16 RX ORDER — LISINOPRIL 5 MG/1
20 TABLET ORAL DAILY
COMMUNITY
End: 2019-06-03 | Stop reason: SDUPTHER

## 2019-05-16 NOTE — TELEPHONE ENCOUNTER
Patient called stating the last 4 days her blood pressure has been elevated. Over the weekend she got a bad headache and was dizzy so she checked. Highest reading was 189/109 and lowest being 155/88. Please advise. Spoke with Dr Jerilee Apgar and she suggest patient go up on her Lisinopril to 10 mg daily and report readings to us next week. Patient states understanding.

## 2019-05-24 ENCOUNTER — TELEPHONE (OUTPATIENT)
Dept: FAMILY MEDICINE CLINIC | Age: 69
End: 2019-05-24

## 2019-05-24 NOTE — TELEPHONE ENCOUNTER
A little better, but still high. Would increase lisinopril to 20mg daily. I can send a script if she doesn't think that she will have enough.

## 2019-05-24 NOTE — TELEPHONE ENCOUNTER
BP readings from the last week  160/78 78  147/93 72  [id number] 78   152/84 73  142/88 58  155/96 76  131/90 81  163/86 77   161/94 71  124/66 73  155/93 71

## 2019-06-03 ENCOUNTER — TELEPHONE (OUTPATIENT)
Dept: FAMILY MEDICINE CLINIC | Age: 69
End: 2019-06-03

## 2019-06-03 RX ORDER — LISINOPRIL 20 MG/1
20 TABLET ORAL DAILY
Qty: 90 TABLET | Refills: 1 | Status: SHIPPED | OUTPATIENT
Start: 2019-06-03 | End: 2019-11-13 | Stop reason: SDUPTHER

## 2019-06-03 NOTE — TELEPHONE ENCOUNTER
Last weeks b/p readings after increasing to Lisinopril 20 mg daily. If ok with dose will need new script sent to [facility].     121/65 p 72  112/71 p 66   145/77 p 78  135 73 p 70  119/65 p 73  133/92 p 70  133/71 p 65  126/68 p 72  126/75 p 66

## 2019-06-16 DIAGNOSIS — I10 ESSENTIAL HYPERTENSION: ICD-10-CM

## 2019-06-17 RX ORDER — METOPROLOL TARTRATE 50 MG/1
TABLET, FILM COATED ORAL
Qty: 90 TABLET | Refills: 1 | Status: SHIPPED | OUTPATIENT
Start: 2019-06-17 | End: 2019-12-14 | Stop reason: SDUPTHER

## 2019-08-27 RX ORDER — PRAVASTATIN SODIUM 40 MG
TABLET ORAL
Qty: 90 TABLET | Refills: 3 | Status: SHIPPED | OUTPATIENT
Start: 2019-08-27 | End: 2019-09-26 | Stop reason: SDUPTHER

## 2019-08-27 NOTE — TELEPHONE ENCOUNTER
Romaine Delgado called requesting a refill of the below medication which has been pended for you:     Requested Prescriptions     Pending Prescriptions Disp Refills    pravastatin (PRAVACHOL) 40 MG tablet [Pharmacy Med Name: PRAVASTATIN TABS 40MG] 90 tablet 4     Sig: TAKE 1 TABLET DAILY       Last Appointment Date: 3/18/2019  Next Appointment Date: 9/18/2019    Allergies   Allergen Reactions    Simvastatin Other (See Comments)     Muscle cramps in legs

## 2019-09-11 ENCOUNTER — HOSPITAL ENCOUNTER (OUTPATIENT)
Dept: LAB | Age: 69
Discharge: HOME | End: 2019-09-11
Payer: MEDICARE

## 2019-09-11 DIAGNOSIS — E78.2 MIXED HYPERLIPIDEMIA: ICD-10-CM

## 2019-09-11 DIAGNOSIS — R73.01 IMPAIRED FASTING BLOOD SUGAR: ICD-10-CM

## 2019-09-11 DIAGNOSIS — I10 ESSENTIAL HYPERTENSION: ICD-10-CM

## 2019-09-11 LAB
ABSOLUTE EOS #: 0.1 K/UL (ref 0–0.4)
ABSOLUTE IMMATURE GRANULOCYTE: ABNORMAL K/UL (ref 0–0.3)
ABSOLUTE LYMPH #: 0.9 K/UL (ref 1–4.8)
ABSOLUTE MONO #: 0.3 K/UL (ref 0.1–1.2)
ALBUMIN SERPL-MCNC: 4.3 G/DL (ref 3.5–5.2)
ALBUMIN/GLOBULIN RATIO: 1.7 (ref 1–2.5)
ALP BLD-CCNC: 74 U/L (ref 35–104)
ALT SERPL-CCNC: 43 U/L (ref 5–33)
ANION GAP SERPL CALCULATED.3IONS-SCNC: 12 MMOL/L (ref 9–17)
AST SERPL-CCNC: 26 U/L
BASOPHILS # BLD: 1 % (ref 0–1)
BASOPHILS ABSOLUTE: 0 K/UL (ref 0–0.2)
BILIRUB SERPL-MCNC: 0.4 MG/DL (ref 0.3–1.2)
BUN BLDV-MCNC: 14 MG/DL (ref 8–23)
BUN/CREAT BLD: 28 (ref 9–20)
CALCIUM SERPL-MCNC: 9.5 MG/DL (ref 8.6–10.4)
CHLORIDE BLD-SCNC: 104 MMOL/L (ref 98–107)
CHOLESTEROL/HDL RATIO: 8.4
CHOLESTEROL: 235 MG/DL
CO2: 26 MMOL/L (ref 20–31)
CREAT SERPL-MCNC: 0.5 MG/DL (ref 0.5–0.9)
DIFFERENTIAL TYPE: ABNORMAL
EOSINOPHILS RELATIVE PERCENT: 2 % (ref 1–7)
ESTIMATED AVERAGE GLUCOSE: 108 MG/DL
GFR AFRICAN AMERICAN: >60 ML/MIN
GFR NON-AFRICAN AMERICAN: >60 ML/MIN
GFR SERPL CREATININE-BSD FRML MDRD: ABNORMAL ML/MIN/{1.73_M2}
GFR SERPL CREATININE-BSD FRML MDRD: ABNORMAL ML/MIN/{1.73_M2}
GLUCOSE BLD-MCNC: 102 MG/DL (ref 70–99)
HBA1C MFR BLD: 5.4 % (ref 4.8–5.9)
HCT VFR BLD CALC: 45.1 % (ref 36–46)
HDLC SERPL-MCNC: 28 MG/DL
HEMOGLOBIN: 15.4 G/DL (ref 12–16)
IMMATURE GRANULOCYTES: ABNORMAL %
LDL CHOLESTEROL DIRECT: 92 MG/DL
LDL CHOLESTEROL: ABNORMAL MG/DL (ref 0–130)
LYMPHOCYTES # BLD: 31 % (ref 16–46)
MCH RBC QN AUTO: 30.9 PG (ref 26–34)
MCHC RBC AUTO-ENTMCNC: 34.1 G/DL (ref 31–37)
MCV RBC AUTO: 90.6 FL (ref 80–100)
MONOCYTES # BLD: 9 % (ref 4–11)
NRBC AUTOMATED: ABNORMAL PER 100 WBC
PDW BLD-RTO: 13 % (ref 11–14.5)
PLATELET # BLD: 123 K/UL (ref 140–450)
PLATELET ESTIMATE: ABNORMAL
PMV BLD AUTO: 8.7 FL (ref 6–12)
POTASSIUM SERPL-SCNC: 4.1 MMOL/L (ref 3.7–5.3)
RBC # BLD: 4.98 M/UL (ref 4–5.2)
RBC # BLD: ABNORMAL 10*6/UL
SEG NEUTROPHILS: 57 % (ref 43–77)
SEGMENTED NEUTROPHILS ABSOLUTE COUNT: 1.6 K/UL (ref 1.8–7.7)
SODIUM BLD-SCNC: 142 MMOL/L (ref 135–144)
TOTAL PROTEIN: 6.9 G/DL (ref 6.4–8.3)
TRIGL SERPL-MCNC: 644 MG/DL
VLDLC SERPL CALC-MCNC: ABNORMAL MG/DL (ref 1–30)
WBC # BLD: 2.9 K/UL (ref 3.5–11)
WBC # BLD: ABNORMAL 10*3/UL

## 2019-09-11 PROCEDURE — 80053 COMPREHEN METABOLIC PANEL: CPT

## 2019-09-11 PROCEDURE — 80061 LIPID PANEL: CPT

## 2019-09-11 PROCEDURE — 83036 HEMOGLOBIN GLYCOSYLATED A1C: CPT

## 2019-09-11 PROCEDURE — 85025 COMPLETE CBC W/AUTO DIFF WBC: CPT

## 2019-09-11 PROCEDURE — 83721 ASSAY OF BLOOD LIPOPROTEIN: CPT

## 2019-09-11 PROCEDURE — 36415 COLL VENOUS BLD VENIPUNCTURE: CPT

## 2019-09-26 ENCOUNTER — OFFICE VISIT (OUTPATIENT)
Dept: FAMILY MEDICINE CLINIC | Age: 69
End: 2019-09-26
Payer: MEDICARE

## 2019-09-26 VITALS
HEART RATE: 64 BPM | HEIGHT: 63 IN | RESPIRATION RATE: 16 BRPM | DIASTOLIC BLOOD PRESSURE: 84 MMHG | WEIGHT: 223 LBS | BODY MASS INDEX: 39.51 KG/M2 | SYSTOLIC BLOOD PRESSURE: 130 MMHG

## 2019-09-26 DIAGNOSIS — Z00.00 ROUTINE GENERAL MEDICAL EXAMINATION AT A HEALTH CARE FACILITY: ICD-10-CM

## 2019-09-26 DIAGNOSIS — Z13.6 SCREENING FOR CARDIOVASCULAR CONDITION: ICD-10-CM

## 2019-09-26 DIAGNOSIS — E78.2 MIXED HYPERLIPIDEMIA: ICD-10-CM

## 2019-09-26 DIAGNOSIS — D69.6 THROMBOCYTOPENIA (HCC): ICD-10-CM

## 2019-09-26 DIAGNOSIS — Z23 NEED FOR INFLUENZA VACCINATION: ICD-10-CM

## 2019-09-26 DIAGNOSIS — R73.01 IMPAIRED FASTING BLOOD SUGAR: ICD-10-CM

## 2019-09-26 DIAGNOSIS — G47.33 OBSTRUCTIVE SLEEP APNEA: ICD-10-CM

## 2019-09-26 DIAGNOSIS — I10 ESSENTIAL HYPERTENSION: Primary | ICD-10-CM

## 2019-09-26 DIAGNOSIS — D72.819 LEUKOPENIA, UNSPECIFIED TYPE: ICD-10-CM

## 2019-09-26 PROCEDURE — G0446 INTENS BEHAVE THER CARDIO DX: HCPCS | Performed by: FAMILY MEDICINE

## 2019-09-26 PROCEDURE — G0439 PPPS, SUBSEQ VISIT: HCPCS | Performed by: FAMILY MEDICINE

## 2019-09-26 PROCEDURE — 99213 OFFICE O/P EST LOW 20 MIN: CPT | Performed by: FAMILY MEDICINE

## 2019-09-26 PROCEDURE — G0008 ADMIN INFLUENZA VIRUS VAC: HCPCS | Performed by: FAMILY MEDICINE

## 2019-09-26 PROCEDURE — 90653 IIV ADJUVANT VACCINE IM: CPT | Performed by: FAMILY MEDICINE

## 2019-09-26 RX ORDER — PRAVASTATIN SODIUM 80 MG/1
80 TABLET ORAL DAILY
Qty: 90 TABLET | Refills: 1 | Status: SHIPPED | OUTPATIENT
Start: 2019-09-26 | End: 2020-03-02 | Stop reason: ALTCHOICE

## 2019-09-26 ASSESSMENT — PATIENT HEALTH QUESTIONNAIRE - PHQ9
SUM OF ALL RESPONSES TO PHQ QUESTIONS 1-9: 0
SUM OF ALL RESPONSES TO PHQ QUESTIONS 1-9: 0

## 2019-09-26 ASSESSMENT — LIFESTYLE VARIABLES: HOW OFTEN DO YOU HAVE A DRINK CONTAINING ALCOHOL: 0

## 2019-09-26 NOTE — PROGRESS NOTES
Osteoarthritis      Past Surgical History:   Procedure Laterality Date    ABDOMINAL EXPLORATION SURGERY  1994    With left salpingo-oophorectomy.  HYSTEROSCOPY  08/25/2005    With hysteroscopic resection of polyps and submucous fibroid performed by Dr. Usha Damico.  OTHER SURGICAL HISTORY  07/12/2005    Had intraductal papilloma of the right breast excised. Family History   Problem Relation Age of Onset    Heart Disease Father     Heart Disease Maternal Grandfather     High Blood Pressure Maternal Grandfather     Stroke Maternal Grandfather     Heart Disease Maternal Grandmother     High Blood Pressure Maternal Grandmother     Stroke Maternal Grandmother     High Blood Pressure Mother     Thyroid Disease Mother     Cancer Mother         Neck    High Blood Pressure Other     Cancer Child         Clavicle. CareTeam (Including outside providers/suppliers regularly involved in providing care):   Patient Care Team:  Ronit Harrison DO as PCP - General (Family Medicine)  Ronit Harrison DO as PCP - [facility] Empaneled Provider    Wt Readings from Last 3 Encounters:   09/26/19 223 lb (101.2 kg)   03/18/19 224 lb (101.6 kg)   02/18/19 218 lb 9.6 oz (99.2 kg)     Vitals:    09/26/19 1004   BP: 130/84   Site: Left Upper Arm   Position: Sitting   Cuff Size: Large Adult   Pulse: 64   Resp: 16   Weight: 223 lb (101.2 kg)   Height: 5' 3\" (1.6 m)     Body mass index is 39.5 kg/m². Based upon direct observation of the patient, evaluation of cognition reveals recent and remote memory intact. Patient's complete Health Risk Assessment and screening values have been reviewed and are found in Flowsheets. The following problems were reviewed today and where indicated follow up appointments were made and/or referrals ordered.     Positive Risk Factor Screenings with Interventions:     General Health:  General  In general, how would you say your health is?: Good  In the past 7 days, have you experienced any of the following? New or Increased Pain, New or Increased Fatigue, Loneliness, Social Isolation, Stress or Anger?: (!) Stress  Do you get the social and emotional support that you need?: Yes  Do you have a Living Will?: (!) No  General Health Risk Interventions:  · Stress: regular exercise recommended- 3-5 times per week, 30-45 minutes per session  · No Living Will: additional information provided recommended advanced directives    Health Habits/Nutrition:  Health Habits/Nutrition  Do you exercise for at least 20 minutes 2-3 times per week?: (!) No  Have you lost any weight without trying in the past 3 months?: No  Do you eat fewer than 2 meals per day?: No  Have you seen a dentist within the past year?: Yes  Body mass index is 39.5 kg/m².   Health Habits/Nutrition Interventions:  · Inadequate physical activity:  recommended increased regular activity    Personalized Preventive Plan   Current Health Maintenance Status  Immunization History   Administered Date(s) Administered    Influenza, High Dose (Fluzone 65 yrs and older) 12/01/2016, 10/21/2017, 09/07/2018    Pneumococcal Conjugate 13-valent ([id number]) 12/01/2016    Pneumococcal Polysaccharide ([id number]) 03/06/2018    Tdap (Boostrix, Adacel) 01/03/2017    Zoster Live (Zostavax) 01/21/2015    Zoster Recombinant (Shingrix) 05/22/2018, 09/06/2018        Health Maintenance   Topic Date Due    DEXA (modify frequency per FRAX score)  Recommended, patient declined      Annual Wellness Visit (AWV)  Done today    Flu vaccine (1) Recommended, patient declined      A1C test (Diabetic or Prediabetic)  09/11/2020    Lipid screen  09/11/2020    Potassium monitoring  09/11/2020    Creatinine monitoring  09/11/2020    Breast cancer screen  05/08/2021    Colon cancer screen colonoscopy  06/22/2022    DTaP/Tdap/Td vaccine (2 - Td) 01/03/2027    Shingles Vaccine  Completed    Pneumococcal 65+ years Vaccine  Completed    Hepatitis C screen  Completed

## 2019-09-26 NOTE — PATIENT INSTRUCTIONS
Hospital Outpatient Visit on 09/11/2019   Component Date Value Ref Range Status    Cholesterol 09/11/2019 235* <200 mg/dL Final    Comment:    Cholesterol Guidelines:      <200  Desirable   200-240  Borderline      >240  Undesirable         HDL 09/11/2019 28* >40 mg/dL Final    Comment:    HDL Guidelines:    <40     Undesirable   40-59    Borderline    >59     Desirable         LDL Cholesterol 09/11/2019       0 - 130 mg/dL Final    Comment: Calculation not valid for Triglyceride value greater than 400 mg/dL. Direct LDL reflexed           LDL Guidelines:     <100    Desirable   100-129   Near to/above Desirable   130-159   Borderline      >159   Undesirable     Direct (measured) LDL and calculated LDL are not interchangeable tests.  Chol/HDL Ratio 09/11/2019 8.4* <5 Final            Triglycerides 09/11/2019 644* <150 mg/dL Final    Comment:    Triglyceride Guidelines:     <150   Desirable   150-199  Borderline   200-499  High     >499   Very high   Based on AHA Guidelines for fasting triglyceride, October 2012.          Hemoglobin A1C 09/11/2019 5.4  4.8 - 5.9 % Final    Estimated Avg Glucose 09/11/2019 108  mg/dL Final    Glucose 09/11/2019 102* 70 - 99 mg/dL Final    BUN 09/11/2019 14  8 - 23 mg/dL Final    CREATININE 09/11/2019 0.50  0.50 - 0.90 mg/dL Final    Bun/Cre Ratio 09/11/2019 28* 9 - 20 Final    Calcium 09/11/2019 9.5  8.6 - 10.4 mg/dL Final    Sodium 09/11/2019 142  135 - 144 mmol/L Final    Potassium 09/11/2019 4.1  3.7 - 5.3 mmol/L Final    Chloride 09/11/2019 104  98 - 107 mmol/L Final    CO2 09/11/2019 26  20 - 31 mmol/L Final    Anion Gap 09/11/2019 12  9 - 17 mmol/L Final    Alkaline Phosphatase 09/11/2019 74  35 - 104 U/L Final    ALT 09/11/2019 43* 5 - 33 U/L Final    AST 09/11/2019 26  <32 U/L Final    Total Bilirubin 09/11/2019 0.40  0.3 - 1.2 mg/dL Final    Total Protein 09/11/2019 6.9  6.4 - 8.3 g/dL Final    Alb 09/11/2019 4.3  3.5 - 5.2 g/dL Final   

## 2019-09-29 ASSESSMENT — ENCOUNTER SYMPTOMS
ABDOMINAL PAIN: 0
EYE REDNESS: 0
RHINORRHEA: 0
SHORTNESS OF BREATH: 0
EYE DISCHARGE: 0
NAUSEA: 0
DIARRHEA: 0
WHEEZING: 0
COUGH: 0
VOMITING: 0
SINUS PRESSURE: 0
SORE THROAT: 0
TROUBLE SWALLOWING: 0
CONSTIPATION: 0

## 2019-09-29 NOTE — PROGRESS NOTES
DO   Multiple Vitamins-Minerals (PRESERVISION AREDS 2 PO) Take 2 tablets by mouth daily Yes Historical Provider, MD        Social History     Tobacco Use    Smoking status: Never Smoker    Smokeless tobacco: Never Used   Substance Use Topics    Alcohol use: Yes     Comment: Rare alcohol use. Vitals:    09/26/19 1004   BP: 130/84   Site: Left Upper Arm   Position: Sitting   Cuff Size: Large Adult   Pulse: 64   Resp: 16   Weight: 223 lb (101.2 kg)   Height: 5' 3\" (1.6 m)     Estimated body mass index is 39.5 kg/m² as calculated from the following:    Height as of this encounter: 5' 3\" (1.6 m). Weight as of this encounter: 223 lb (101.2 kg). Physical Exam   Constitutional: She is oriented to person, place, and time. She appears well-developed and well-nourished. No distress. HENT:   Head: Normocephalic and atraumatic. Right Ear: External ear normal.   Left Ear: External ear normal.   Nose: Nose normal.   Mouth/Throat: Oropharynx is clear and moist. No oropharyngeal exudate. Eyes: Pupils are equal, round, and reactive to light. Conjunctivae and EOM are normal. Right eye exhibits no discharge. Left eye exhibits no discharge. Neck: Normal range of motion. Neck supple. No thyromegaly present. Cardiovascular: Normal rate, regular rhythm and normal heart sounds. Pulmonary/Chest: Effort normal and breath sounds normal. She has no wheezes. She has no rales. Abdominal: Soft. Bowel sounds are normal.   Musculoskeletal: She exhibits no edema. Lymphadenopathy:     She has no cervical adenopathy. Neurological: She is alert and oriented to person, place, and time. Skin: Skin is warm and dry. No rash noted. She is not diaphoretic. Psychiatric: She has a normal mood and affect. Her behavior is normal. Judgment and thought content normal.   Nursing note and vitals reviewed. ASSESSMENT/PLAN:  Encounter Diagnoses   Name Primary?     Essential hypertension Yes    Leukopenia, unspecified type

## 2019-10-10 RX ORDER — ROSUVASTATIN CALCIUM 20 MG/1
20 TABLET, COATED ORAL NIGHTLY
Qty: 30 TABLET | Refills: 2 | Status: SHIPPED | OUTPATIENT
Start: 2019-10-10 | End: 2020-01-27 | Stop reason: SDUPTHER

## 2019-10-14 RX ORDER — AMLODIPINE BESYLATE 5 MG/1
TABLET ORAL
Qty: 90 TABLET | Refills: 4 | Status: SHIPPED | OUTPATIENT
Start: 2019-10-14 | End: 2021-01-04

## 2019-11-13 RX ORDER — LISINOPRIL 20 MG/1
TABLET ORAL
Qty: 90 TABLET | Refills: 3 | Status: SHIPPED | OUTPATIENT
Start: 2019-11-13 | End: 2020-11-09

## 2019-12-14 DIAGNOSIS — I10 ESSENTIAL HYPERTENSION: ICD-10-CM

## 2019-12-16 RX ORDER — METOPROLOL TARTRATE 50 MG/1
TABLET, FILM COATED ORAL
Qty: 90 TABLET | Refills: 1 | Status: SHIPPED | OUTPATIENT
Start: 2019-12-16 | End: 2020-06-11

## 2020-01-27 RX ORDER — ROSUVASTATIN CALCIUM 20 MG/1
20 TABLET, COATED ORAL NIGHTLY
Qty: 90 TABLET | Refills: 1 | Status: SHIPPED | OUTPATIENT
Start: 2020-01-27 | End: 2020-08-04

## 2020-03-02 ENCOUNTER — OFFICE VISIT (OUTPATIENT)
Dept: FAMILY MEDICINE CLINIC | Age: 70
End: 2020-03-02
Payer: MEDICARE

## 2020-03-02 VITALS
HEART RATE: 68 BPM | DIASTOLIC BLOOD PRESSURE: 86 MMHG | WEIGHT: 227 LBS | HEIGHT: 63 IN | BODY MASS INDEX: 40.22 KG/M2 | SYSTOLIC BLOOD PRESSURE: 138 MMHG | OXYGEN SATURATION: 98 %

## 2020-03-02 PROCEDURE — 99213 OFFICE O/P EST LOW 20 MIN: CPT | Performed by: FAMILY MEDICINE

## 2020-03-02 PROCEDURE — 20610 DRAIN/INJ JOINT/BURSA W/O US: CPT | Performed by: FAMILY MEDICINE

## 2020-03-02 RX ORDER — TRIAMCINOLONE ACETONIDE 40 MG/ML
40 INJECTION, SUSPENSION INTRA-ARTICULAR; INTRAMUSCULAR ONCE
Status: COMPLETED | OUTPATIENT
Start: 2020-03-02 | End: 2020-03-02

## 2020-03-02 RX ORDER — PREDNISONE 20 MG/1
TABLET ORAL
Qty: 15 TABLET | Refills: 0 | Status: SHIPPED | OUTPATIENT
Start: 2020-03-02 | End: 2020-08-11 | Stop reason: ALTCHOICE

## 2020-03-02 RX ADMIN — TRIAMCINOLONE ACETONIDE 40 MG: 40 INJECTION, SUSPENSION INTRA-ARTICULAR; INTRAMUSCULAR at 16:15

## 2020-03-02 ASSESSMENT — PATIENT HEALTH QUESTIONNAIRE - PHQ9
SUM OF ALL RESPONSES TO PHQ QUESTIONS 1-9: 0
2. FEELING DOWN, DEPRESSED OR HOPELESS: 0
SUM OF ALL RESPONSES TO PHQ9 QUESTIONS 1 & 2: 0
1. LITTLE INTEREST OR PLEASURE IN DOING THINGS: 0
SUM OF ALL RESPONSES TO PHQ QUESTIONS 1-9: 0

## 2020-03-03 ASSESSMENT — ENCOUNTER SYMPTOMS
RESPIRATORY NEGATIVE: 1
GASTROINTESTINAL NEGATIVE: 1
EYES NEGATIVE: 1

## 2020-03-03 NOTE — PROGRESS NOTES
3/2/2020     Terrence Pandya (:  1950) is a 77 y.o. female, here for evaluation of the following medical concerns:    Hip Pain    The incident occurred 5 to 7 days ago (Patient has been staying at [facility] as her  is in the ICU and states that she has developed pain in the left lateral hip and into the left groin region). There was no injury mechanism. The pain is present in the left hip (left groin). The pain is moderate. The pain has been constant since onset. Associated symptoms include an inability to bear weight (pain with weight bearing or turning leg or hip in a certain direction). Pertinent negatives include no loss of motion, numbness or tingling. The symptoms are aggravated by movement, palpation and weight bearing (laying on left side). She has tried acetaminophen for the symptoms. The treatment provided no relief. Did review patient's med list, allergies, social history,pmhx and pshx today as noted in the record. Review of Systems   Constitutional: Negative for chills, fatigue and fever. HENT: Negative. Eyes: Negative. Respiratory: Negative. Cardiovascular: Negative. Gastrointestinal: Negative. Musculoskeletal: Positive for arthralgias, gait problem and myalgias. Neurological: Negative for tingling, weakness and numbness. Prior to Visit Medications    Medication Sig Taking?  Authorizing Provider   predniSONE (DELTASONE) 20 MG tablet 1 bid for 5 days, 1 qd for 5 days Yes Ronna Hamm, DO   metoprolol tartrate (LOPRESSOR) 50 MG tablet TAKE 1 TABLET DAILY Yes Desirae Calhoun DO   lisinopril (PRINIVIL;ZESTRIL) 20 MG tablet TAKE 1 TABLET DAILY Yes Desirae Calhoun DO   amLODIPine (NORVASC) 5 MG tablet TAKE 1 TABLET DAILY Yes Desirae Calhoun DO   Omega-3 Fatty Acids (FISH OIL PO) Take by mouth daily Yes Historical Provider, MD   diclofenac-Misoprostol (ARTHROTEC 75) 75-0.2 MG per tablet TAKE 1 TABLET TWICE A DAY  Patient taking differently: 1 tab once daily Yes David Ortiz DO   Multiple Vitamins-Minerals (PRESERVISION AREDS 2 PO) Take 2 tablets by mouth daily Yes Historical Provider, MD   rosuvastatin (CRESTOR) 20 MG tablet Take 1 tablet by mouth nightly  David Ortiz DO        Social History     Tobacco Use    Smoking status: Never Smoker    Smokeless tobacco: Never Used   Substance Use Topics    Alcohol use: Yes     Comment: Rare alcohol use. Vitals:    03/02/20 1523   BP: 138/86   Site: Left Upper Arm   Position: Sitting   Cuff Size: Large Adult   Pulse: 68   SpO2: 98%   Weight: 227 lb (103 kg)   Height: 5' 3\" (1.6 m)     Estimated body mass index is 40.21 kg/m² as calculated from the following:    Height as of this encounter: 5' 3\" (1.6 m). Weight as of this encounter: 227 lb (103 kg). Physical Exam  Vitals signs and nursing note reviewed. Constitutional:       General: She is not in acute distress. Appearance: She is well-developed. She is not diaphoretic. HENT:      Head: Normocephalic and atraumatic. Eyes:      Conjunctiva/sclera: Conjunctivae normal.   Neck:      Musculoskeletal: Normal range of motion. Pulmonary:      Effort: Pulmonary effort is normal.   Musculoskeletal: Normal range of motion. General: Tenderness present. No swelling. Comments: Reproducible pain with palpation to the left lateral hip over the greater trochanteric bursa. No swelling noted to the area. Moves the extremity in a normal range of motion. Does have pain with palpation to the left hip anterior groin region over the hip flexors. Skin:     General: Skin is warm and dry. Coloration: Skin is not pale. Findings: No erythema or rash. Neurological:      Mental Status: She is alert and oriented to person, place, and time. Psychiatric:         Behavior: Behavior normal.         Thought Content:  Thought content normal.         Judgment: Judgment normal.         ASSESSMENT/PLAN:  Encounter Diagnoses   Name Primary?  Left hip pain Yes    Trochanteric bursitis of left hip      Orders Placed This Encounter   Procedures    LA ARTHROCENTESIS ASPIR&/INJ MAJOR JT/BURSA W/O US     Orders Placed This Encounter   Medications    triamcinolone acetonide (KENALOG-40) injection 40 mg    predniSONE (DELTASONE) 20 MG tablet     Si bid for 5 days, 1 qd for 5 days     Dispense:  15 tablet     Refill:  0     Procedure Note    PREOP DIAGNOSIS:  [] Right [x]  Left    [] Bilateral Hip Pain    POSTOP DIAGNOSIS:  [] Right [x]  Left    [] Bilateral Hip Pain    OPERATION:  [] Right [x]  Left    [] Bilateral Greater trochanter injection      PROCEDURE:  After sterile prep, 40 mg of Kenalog and 2 mL of 1% Xylocaine without epinephrine was injected into the bursa without incident. Pre- and post neurovascular status verified. No complications noted. Did give oral steroid for patient to take to help with any residual inflammation. Does have pain into the left groin so likely there is some underlying muscular tension. Would recommend stretching exercises as able. Avoid laying on left side. Ice to area as needed. Tylenol/Motrin prn    Return  if no improvement in symptoms or if any further symptoms arise. No follow-ups on file. An electronic signature was used to authenticate this note.     --Toshia Lewis DO on 3/3/2020 at 10:59 AM

## 2020-06-11 RX ORDER — METOPROLOL TARTRATE 50 MG/1
TABLET, FILM COATED ORAL
Qty: 90 TABLET | Refills: 1 | Status: SHIPPED | OUTPATIENT
Start: 2020-06-11 | End: 2020-12-08

## 2020-08-03 ENCOUNTER — OFFICE VISIT (OUTPATIENT)
Dept: PRIMARY CARE CLINIC | Age: 70
End: 2020-08-03
Payer: MEDICARE

## 2020-08-03 VITALS
TEMPERATURE: 97.1 F | DIASTOLIC BLOOD PRESSURE: 80 MMHG | BODY MASS INDEX: 36.25 KG/M2 | SYSTOLIC BLOOD PRESSURE: 126 MMHG | HEART RATE: 50 BPM | RESPIRATION RATE: 16 BRPM | WEIGHT: 204.6 LBS | HEIGHT: 63 IN | OXYGEN SATURATION: 97 %

## 2020-08-03 PROCEDURE — 99212 OFFICE O/P EST SF 10 MIN: CPT | Performed by: FAMILY MEDICINE

## 2020-08-03 PROCEDURE — 20610 DRAIN/INJ JOINT/BURSA W/O US: CPT | Performed by: FAMILY MEDICINE

## 2020-08-03 PROCEDURE — 20552 NJX 1/MLT TRIGGER POINT 1/2: CPT | Performed by: FAMILY MEDICINE

## 2020-08-03 PROCEDURE — 99213 OFFICE O/P EST LOW 20 MIN: CPT | Performed by: FAMILY MEDICINE

## 2020-08-03 RX ORDER — TRIAMCINOLONE ACETONIDE 40 MG/ML
40 INJECTION, SUSPENSION INTRA-ARTICULAR; INTRAMUSCULAR ONCE
Status: COMPLETED | OUTPATIENT
Start: 2020-08-03 | End: 2020-08-03

## 2020-08-03 RX ADMIN — TRIAMCINOLONE ACETONIDE 40 MG: 40 INJECTION, SUSPENSION INTRA-ARTICULAR; INTRAMUSCULAR at 14:28

## 2020-08-03 RX ADMIN — TRIAMCINOLONE ACETONIDE 40 MG: 40 INJECTION, SUSPENSION INTRA-ARTICULAR; INTRAMUSCULAR at 14:29

## 2020-08-03 ASSESSMENT — PATIENT HEALTH QUESTIONNAIRE - PHQ9
2. FEELING DOWN, DEPRESSED OR HOPELESS: 0
SUM OF ALL RESPONSES TO PHQ QUESTIONS 1-9: 0
SUM OF ALL RESPONSES TO PHQ QUESTIONS 1-9: 0
1. LITTLE INTEREST OR PLEASURE IN DOING THINGS: 0
SUM OF ALL RESPONSES TO PHQ9 QUESTIONS 1 & 2: 0

## 2020-08-03 NOTE — PROGRESS NOTES
8/3/2020     Tommy Lerma (:  1950) is a 72 y.o. female, here for evaluation of the following medical concerns:    Hip Pain    The incident occurred more than 1 week ago (patient has had ongoing issues with left lateral hip pain and low back pain.  requires significant assistance with positioning and mobility and has to do a lot of lifting with him.). There was no injury mechanism. The pain is present in the left hip (lumbar spine). The quality of the pain is described as aching. The pain is moderate. The pain has been constant since onset. Associated symptoms include an inability to bear weight. Pertinent negatives include no loss of motion, numbness or tingling. The symptoms are aggravated by movement, palpation and weight bearing. She has tried acetaminophen for the symptoms. The treatment provided no relief. Did review patient's med list, allergies, social history,pmhx and pshx today as noted in the record. Review of Systems   Constitutional: Negative. HENT: Negative. Eyes: Negative. Respiratory: Negative. Cardiovascular: Negative. Gastrointestinal: Negative. Musculoskeletal: Positive for arthralgias, back pain, gait problem and myalgias. Negative for joint swelling, neck pain and neck stiffness. Skin: Negative. Neurological: Negative for tingling, weakness and numbness. Prior to Visit Medications    Medication Sig Taking?  Authorizing Provider   metoprolol tartrate (LOPRESSOR) 50 MG tablet TAKE 1 TABLET DAILY Yes Kimberlee Hodges DO   rosuvastatin (CRESTOR) 20 MG tablet Take 1 tablet by mouth nightly  Patient taking differently: Take 20 mg by mouth every morning  Yes Kimberlee Hodges DO   lisinopril (PRINIVIL;ZESTRIL) 20 MG tablet TAKE 1 TABLET DAILY Yes Desirae Calhoun,    amLODIPine (NORVASC) 5 MG tablet TAKE 1 TABLET DAILY Yes Kimberlee Hodges DO   Omega-3 Fatty Acids (FISH OIL PO) Take by mouth daily Yes Historical Provider, MD diclofenac-Misoprostol (ARTHROTEC 75) 75-0.2 MG per tablet TAKE 1 TABLET TWICE A DAY  Patient taking differently: 1 tab once daily Yes Sam Henry DO   Multiple Vitamins-Minerals (PRESERVISION AREDS 2 PO) Take 2 tablets by mouth daily Yes Historical Provider, MD   predniSONE (DELTASONE) 20 MG tablet 1 bid for 5 days, 1 qd for 5 days  Patient not taking: Reported on 8/3/2020  Sam Henry DO        Social History     Tobacco Use    Smoking status: Never Smoker    Smokeless tobacco: Never Used   Substance Use Topics    Alcohol use: Yes     Comment: Rare alcohol use. Vitals:    08/03/20 1335   BP: 126/80   Site: Left Upper Arm   Position: Sitting   Cuff Size: Medium Adult   Pulse: 50   Resp: 16   Temp: 97.1 °F (36.2 °C)   SpO2: 97%   Weight: 204 lb 9.6 oz (92.8 kg)   Height: 5' 3\" (1.6 m)     Estimated body mass index is 36.24 kg/m² as calculated from the following:    Height as of this encounter: 5' 3\" (1.6 m). Weight as of this encounter: 204 lb 9.6 oz (92.8 kg). Physical Exam  Vitals signs and nursing note reviewed. Constitutional:       General: She is not in acute distress. Appearance: She is well-developed. She is not diaphoretic. HENT:      Head: Normocephalic and atraumatic. Eyes:      Conjunctiva/sclera: Conjunctivae normal.   Neck:      Musculoskeletal: Normal range of motion. Pulmonary:      Effort: Pulmonary effort is normal.   Musculoskeletal: Normal range of motion. General: Tenderness present. No swelling, deformity or signs of injury. Comments: Reproducible pain with palpation to the left lateral hip and Increased paravertebral muscular tension and tenderness with palpation to the lumbosacral spine. Pain is most notable with trigger points over the SI joints bilaterally. Skin:     General: Skin is warm and dry. Coloration: Skin is not pale. Findings: No erythema or rash.    Neurological:      Mental Status: She is alert and oriented to person, place, and time. Psychiatric:         Behavior: Behavior normal.         Thought Content: Thought content normal.         Judgment: Judgment normal.         ASSESSMENT/PLAN:  Encounter Diagnoses   Name Primary?  Trochanteric bursitis of left hip Yes    Acute bilateral low back pain without sciatica     Myalgia      Orders Placed This Encounter   Medications    triamcinolone acetonide (KENALOG-40) injection 40 mg    triamcinolone acetonide (KENALOG-40) injection 40 mg     Orders Placed This Encounter   Procedures    RI ARTHROCENTESIS ASPIR&/INJ MAJOR JT/BURSA W/O US    RI INJECT TRIGGER POINT, 1 OR 2       Trigger Point Injection Procedure Note  Indication: Severe pain and pain control    Procedure: The patient was placed in the appropriate position and the area over the trigger point was prepped with alcohol. Injection was performed into the trigger point area using 1 cc of Kenalog (triamcinalone 40mg/ml), mixed with 2 cc of 2% Lidocaine without epinephrine. The injection site was covered with bandaid. The patient tolerated the procedure well. Complications: None    Procedure Note    PREOP DIAGNOSIS:  [] Right [x]  Left    [] Bilateral Hip Pain    POSTOP DIAGNOSIS:  [] Right [x]  Left    [] Bilateral Hip Pain    OPERATION:  [] Right [x]  Left    [] Bilateral Greater trochanter injection      PROCEDURE:  After sterile prep, 40 mg of Kenalog and 2 mL of 1% Xylocaine without epinephrine was injected into the bursa without incident. Pre- and post neurovascular status verified. No complications noted. Would use ibuprofen or tylenol as needed for pain. Stretching exercises discussed. Return  if no improvement in symptoms or if any further symptoms arise. No follow-ups on file. An electronic signature was used to authenticate this note.     --Carolyn Persaud DO on 8/3/2020 at 2:02 PM

## 2020-08-04 RX ORDER — ROSUVASTATIN CALCIUM 20 MG/1
TABLET, COATED ORAL
Qty: 90 TABLET | Refills: 1 | Status: SHIPPED | OUTPATIENT
Start: 2020-08-04 | End: 2021-02-04

## 2020-08-04 ASSESSMENT — ENCOUNTER SYMPTOMS
GASTROINTESTINAL NEGATIVE: 1
EYES NEGATIVE: 1
BACK PAIN: 1
RESPIRATORY NEGATIVE: 1

## 2020-08-04 NOTE — TELEPHONE ENCOUNTER
Guy Ramos called requesting a refill of the below medication which has been pended for you:     Requested Prescriptions     Pending Prescriptions Disp Refills    rosuvastatin (CRESTOR) 20 MG tablet [Pharmacy Med Name: ROSUVASTATIN CALCIUM 20 MG TAB] 90 tablet 1     Sig: take 1 tablet by mouth at bedtime       Last Appointment Date: 8/3/2020  Next Appointment Date:9/14/2020    Allergies   Allergen Reactions    Simvastatin Other (See Comments)     Muscle cramps in legs

## 2020-08-11 ENCOUNTER — OFFICE VISIT (OUTPATIENT)
Dept: PRIMARY CARE CLINIC | Age: 70
End: 2020-08-11
Payer: MEDICARE

## 2020-08-11 ENCOUNTER — HOSPITAL ENCOUNTER (OUTPATIENT)
Dept: LAB | Age: 70
Discharge: HOME | End: 2020-08-11
Payer: MEDICARE

## 2020-08-11 ENCOUNTER — HOSPITAL ENCOUNTER (OUTPATIENT)
Dept: CT IMAGING | Age: 70
Discharge: HOME | End: 2020-08-13
Payer: MEDICARE

## 2020-08-11 VITALS
SYSTOLIC BLOOD PRESSURE: 138 MMHG | WEIGHT: 204 LBS | HEART RATE: 64 BPM | HEIGHT: 63 IN | DIASTOLIC BLOOD PRESSURE: 86 MMHG | BODY MASS INDEX: 36.14 KG/M2

## 2020-08-11 LAB
ABSOLUTE EOS #: <0.03 K/UL (ref 0–0.44)
ABSOLUTE IMMATURE GRANULOCYTE: <0.03 K/UL (ref 0–0.3)
ABSOLUTE LYMPH #: 0.92 K/UL (ref 1.1–3.7)
ABSOLUTE MONO #: 0.47 K/UL (ref 0.1–1.2)
ALBUMIN SERPL-MCNC: 4.4 G/DL (ref 3.5–5.2)
ALBUMIN/GLOBULIN RATIO: 1.8 (ref 1–2.5)
ALP BLD-CCNC: 82 U/L (ref 35–104)
ALT SERPL-CCNC: 37 U/L (ref 5–33)
ANION GAP SERPL CALCULATED.3IONS-SCNC: 9 MMOL/L (ref 9–17)
AST SERPL-CCNC: 20 U/L
BASOPHILS # BLD: 1 % (ref 0–2)
BASOPHILS ABSOLUTE: 0.05 K/UL (ref 0–0.2)
BILIRUB SERPL-MCNC: 0.31 MG/DL (ref 0.3–1.2)
BUN BLDV-MCNC: 16 MG/DL (ref 8–23)
BUN/CREAT BLD: 25 (ref 9–20)
CALCIUM SERPL-MCNC: 10.1 MG/DL (ref 8.6–10.4)
CHLORIDE BLD-SCNC: 106 MMOL/L (ref 98–107)
CHOLESTEROL/HDL RATIO: 4
CHOLESTEROL: 151 MG/DL
CO2: 28 MMOL/L (ref 20–31)
CREAT SERPL-MCNC: 0.63 MG/DL (ref 0.5–0.9)
DIFFERENTIAL TYPE: ABNORMAL
EOSINOPHILS RELATIVE PERCENT: 0 % (ref 1–4)
ESTIMATED AVERAGE GLUCOSE: 111 MG/DL
GFR AFRICAN AMERICAN: >60 ML/MIN
GFR NON-AFRICAN AMERICAN: >60 ML/MIN
GFR SERPL CREATININE-BSD FRML MDRD: ABNORMAL ML/MIN/{1.73_M2}
GFR SERPL CREATININE-BSD FRML MDRD: ABNORMAL ML/MIN/{1.73_M2}
GLUCOSE BLD-MCNC: 122 MG/DL (ref 70–99)
HBA1C MFR BLD: 5.5 % (ref 4.8–5.9)
HCT VFR BLD CALC: 47.4 % (ref 36.3–47.1)
HDLC SERPL-MCNC: 38 MG/DL
HEMOGLOBIN: 15.5 G/DL (ref 11.9–15.1)
IMMATURE GRANULOCYTES: 0 %
LDL CHOLESTEROL: 88 MG/DL (ref 0–130)
LYMPHOCYTES # BLD: 16 % (ref 24–43)
MCH RBC QN AUTO: 29.6 PG (ref 25.2–33.5)
MCHC RBC AUTO-ENTMCNC: 32.7 G/DL (ref 25.2–33.5)
MCV RBC AUTO: 90.6 FL (ref 82.6–102.9)
MONOCYTES # BLD: 8 % (ref 3–12)
NRBC AUTOMATED: 0 PER 100 WBC
PDW BLD-RTO: 12.5 % (ref 11.8–14.4)
PLATELET # BLD: 143 K/UL (ref 138–453)
PLATELET ESTIMATE: ABNORMAL
PMV BLD AUTO: 11.1 FL (ref 8.1–13.5)
POTASSIUM SERPL-SCNC: 4.7 MMOL/L (ref 3.7–5.3)
RBC # BLD: 5.23 M/UL (ref 3.95–5.11)
RBC # BLD: ABNORMAL 10*6/UL
SEG NEUTROPHILS: 75 % (ref 36–65)
SEGMENTED NEUTROPHILS ABSOLUTE COUNT: 4.4 K/UL (ref 1.5–8.1)
SODIUM BLD-SCNC: 143 MMOL/L (ref 135–144)
TOTAL PROTEIN: 6.9 G/DL (ref 6.4–8.3)
TRIGL SERPL-MCNC: 125 MG/DL
TROPONIN INTERP: NORMAL
TROPONIN T: NORMAL NG/ML
TROPONIN, HIGH SENSITIVITY: 7 NG/L (ref 0–14)
TSH SERPL DL<=0.05 MIU/L-ACNC: 0.33 MIU/L (ref 0.3–5)
VLDLC SERPL CALC-MCNC: ABNORMAL MG/DL (ref 1–30)
WBC # BLD: 5.9 K/UL (ref 3.5–11.3)
WBC # BLD: ABNORMAL 10*3/UL

## 2020-08-11 PROCEDURE — 80053 COMPREHEN METABOLIC PANEL: CPT

## 2020-08-11 PROCEDURE — 70450 CT HEAD/BRAIN W/O DYE: CPT

## 2020-08-11 PROCEDURE — 93010 ELECTROCARDIOGRAM REPORT: CPT | Performed by: FAMILY MEDICINE

## 2020-08-11 PROCEDURE — 85025 COMPLETE CBC W/AUTO DIFF WBC: CPT

## 2020-08-11 PROCEDURE — 83036 HEMOGLOBIN GLYCOSYLATED A1C: CPT

## 2020-08-11 PROCEDURE — 84484 ASSAY OF TROPONIN QUANT: CPT

## 2020-08-11 PROCEDURE — 99215 OFFICE O/P EST HI 40 MIN: CPT | Performed by: FAMILY MEDICINE

## 2020-08-11 PROCEDURE — 84443 ASSAY THYROID STIM HORMONE: CPT

## 2020-08-11 PROCEDURE — 36415 COLL VENOUS BLD VENIPUNCTURE: CPT

## 2020-08-11 PROCEDURE — 93005 ELECTROCARDIOGRAM TRACING: CPT | Performed by: FAMILY MEDICINE

## 2020-08-11 PROCEDURE — 80061 LIPID PANEL: CPT

## 2020-08-11 RX ORDER — MECLIZINE HCL 12.5 MG/1
12.5 TABLET ORAL 3 TIMES DAILY PRN
Qty: 30 TABLET | Refills: 0 | Status: SHIPPED | OUTPATIENT
Start: 2020-08-11 | End: 2021-02-09

## 2020-08-11 ASSESSMENT — ENCOUNTER SYMPTOMS
ABDOMINAL PAIN: 0
NAUSEA: 1
SHORTNESS OF BREATH: 0
CONSTIPATION: 0
VOMITING: 0
SORE THROAT: 0
RHINORRHEA: 0
SINUS PRESSURE: 0
EYE DISCHARGE: 0
DIARRHEA: 0
COUGH: 0
VISUAL CHANGE: 1
TROUBLE SWALLOWING: 0
WHEEZING: 0
EYE REDNESS: 0

## 2020-08-11 NOTE — PROGRESS NOTES
2020     Rick Padilla (:  1950) is a 72 y.o. female, here for evaluation of the following medical concerns:    Dizziness   This is a new problem. The current episode started yesterday (patient developed abrupt onset of dizziness and lightheadedness last night followed by diaphoresis.  ). The problem occurs constantly. The problem has been gradually improving (is somewhat better today, but still having dizziness). Associated symptoms include diaphoresis (was last night, but after about an hour this seemed to improve), fatigue, nausea (felt nauseated last night), vertigo and a visual change (felt like her eyes/vision was twitching (describes it and sounds like nystagmus)). Pertinent negatives include no abdominal pain, arthralgias, chest pain, chills, congestion, coughing, fever, headaches, myalgias, neck pain, numbness, rash, sore throat, vomiting or weakness. Associated symptoms comments: Felt out of sorts last night. 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 Feels better today, but still lightheaded and dizzy. She does not have chest pain, but did have about 30 seconds of pain in her mid back last night. No palpitations. No shortness of breath. No urinary symptoms. . She has tried rest for the symptoms. The treatment provided moderate relief. Did review patient's med list, allergies, social history,pmhx and pshx today as noted in the record. Review of Systems   Constitutional: Positive for diaphoresis (was last night, but after about an hour this seemed to improve) and fatigue. Negative for chills and fever. HENT: Negative for congestion, ear pain, postnasal drip, rhinorrhea, sinus pressure, sore throat and trouble swallowing. Eyes: Positive for visual disturbance (last night).  Negative for discharge and redness. Respiratory: Negative for cough, shortness of breath and wheezing. Cardiovascular: Negative for chest pain, palpitations and leg swelling. Gastrointestinal: Positive for nausea (felt nauseated last night). Negative for abdominal pain, constipation, diarrhea and vomiting. Genitourinary: Negative for dysuria, flank pain, frequency and urgency. Musculoskeletal: Negative for arthralgias, myalgias and neck pain. Skin: Negative for rash and wound. Allergic/Immunologic: Negative for environmental allergies. Neurological: Positive for dizziness, vertigo and light-headedness. Negative for syncope, facial asymmetry, weakness, numbness and headaches. Hematological: Negative for adenopathy. Psychiatric/Behavioral: Negative. Prior to Visit Medications    Medication Sig Taking? Authorizing Provider   meclizine (ANTIVERT) 12.5 MG tablet Take 1 tablet by mouth 3 times daily as needed for Dizziness Yes Anil Sparks, DO   rosuvastatin (CRESTOR) 20 MG tablet take 1 tablet by mouth at bedtime Yes Anil Sparks, DO   metoprolol tartrate (LOPRESSOR) 50 MG tablet TAKE 1 TABLET DAILY Yes Anil Sparks, DO   lisinopril (PRINIVIL;ZESTRIL) 20 MG tablet TAKE 1 TABLET DAILY Yes Desirae Calhoun, DO   amLODIPine (NORVASC) 5 MG tablet TAKE 1 TABLET DAILY Yes Desirae Calhoun, DO   Omega-3 Fatty Acids (FISH OIL PO) Take by mouth daily Yes Historical Provider, MD   diclofenac-Misoprostol (ARTHROTEC 75) 75-0.2 MG per tablet TAKE 1 TABLET TWICE A DAY  Patient taking differently: 1 tab once daily Yes Anil Sparks, DO   Multiple Vitamins-Minerals (PRESERVISION AREDS 2 PO) Take 2 tablets by mouth daily Yes Historical Provider, MD        Social History     Tobacco Use    Smoking status: Never Smoker    Smokeless tobacco: Never Used   Substance Use Topics    Alcohol use: Yes     Comment: Rare alcohol use.         Vitals:    08/11/20 1005   BP: 138/86   Site: Left Upper Arm   Position: Sitting Cuff Size: Medium Adult   Pulse: 64   Weight: 204 lb (92.5 kg)   Height: 5' 3\" (1.6 m)     Estimated body mass index is 36.14 kg/m² as calculated from the following:    Height as of this encounter: 5' 3\" (1.6 m). Weight as of this encounter: 204 lb (92.5 kg). Physical Exam  Vitals signs and nursing note reviewed. Constitutional:       General: She is not in acute distress. Appearance: Normal appearance. She is well-developed. She is not diaphoretic. HENT:      Head: Normocephalic and atraumatic. Right Ear: Tympanic membrane, ear canal and external ear normal.      Left Ear: Tympanic membrane, ear canal and external ear normal.      Nose: Nose normal.      Mouth/Throat:      Mouth: Mucous membranes are moist.      Pharynx: Oropharynx is clear. No oropharyngeal exudate. Eyes:      General:         Right eye: No discharge. Left eye: No discharge. Conjunctiva/sclera: Conjunctivae normal.      Pupils: Pupils are equal, round, and reactive to light. Neck:      Musculoskeletal: Normal range of motion and neck supple. Thyroid: No thyromegaly. Cardiovascular:      Rate and Rhythm: Normal rate and regular rhythm. Heart sounds: Normal heart sounds. Pulmonary:      Effort: Pulmonary effort is normal.      Breath sounds: Normal breath sounds. No wheezing or rales. Abdominal:      General: Bowel sounds are normal. There is no distension. Palpations: Abdomen is soft. Tenderness: There is no abdominal tenderness. Lymphadenopathy:      Cervical: No cervical adenopathy. Skin:     General: Skin is warm and dry. Findings: No rash. Neurological:      Mental Status: She is alert and oriented to person, place, and time. Mental status is at baseline. Cranial Nerves: Cranial nerves are intact. Sensory: Sensation is intact. Motor: Motor function is intact. No weakness or tremor.       Coordination: Finger-Nose-Finger Test normal.      Gait: Gait is intact. Comments: Appears off balance with Romberg testing, but is able to right herself when attempt to mikel patient. Psychiatric:         Behavior: Behavior normal.         Thought Content: Thought content normal.         Judgment: Judgment normal.         ASSESSMENT/PLAN:  Encounter Diagnoses   Name Primary?     Dizziness Yes    Diaphoresis     Visual disturbance     Acute left-sided thoracic back pain     Essential hypertension     Mixed hyperlipidemia     Impaired fasting blood sugar      Orders Placed This Encounter   Medications    meclizine (ANTIVERT) 12.5 MG tablet     Sig: Take 1 tablet by mouth 3 times daily as needed for Dizziness     Dispense:  30 tablet     Refill:  0     Orders Placed This Encounter   Procedures    CT HEAD WO CONTRAST     Standing Status:   Future     Number of Occurrences:   1     Standing Expiration Date:   8/11/2021     Order Specific Question:   Reason for exam:     Answer:   acute onset dizziness, lightheadedness and visual disturbance last evening with diaphoresis    Lipid Panel     Standing Status:   Future     Number of Occurrences:   1     Standing Expiration Date:   8/11/2021     Order Specific Question:   Is Patient Fasting?/# of Hours     Answer:   12    CBC Auto Differential     Standing Status:   Future     Number of Occurrences:   1     Standing Expiration Date:   8/11/2021    Comprehensive Metabolic Panel     Standing Status:   Future     Number of Occurrences:   1     Standing Expiration Date:   8/11/2021    Troponin I     Standing Status:   Future     Number of Occurrences:   1     Standing Expiration Date:   8/11/2021    Hemoglobin A1C     Standing Status:   Future     Number of Occurrences:   1     Standing Expiration Date:   8/11/2021    TSH without Reflex     Standing Status:   Future     Number of Occurrences:   1     Standing Expiration Date:   8/11/2021    EKG 12 Lead     Order Specific Question:   Reason for Exam?     Answer:   Chest dizziness, lightheadedness and visual disturbance last evening  with diaphoresis  Reason for Exam: C/o dizziness  Acuity: Acute  Type of Exam: Initial    FINDINGS:  BRAIN/VENTRICLES: There is no acute intracranial hemorrhage, mass effect or  midline shift. No abnormal extra-axial fluid collection. The gray-white  differentiation is maintained without evidence of an acute infarct. There is  no evidence of hydrocephalus. ORBITS: The visualized portion of the orbits demonstrate no acute abnormality. SINUSES: The visualized paranasal sinuses and mastoid air cells demonstrate  no acute abnormality. SOFT TISSUES/SKULL:  Multiple soft tissue/scalp nodules. Impression: No acute intracranial abnormality. CT brain does not show acute abnormality today. Lab testing relatively normal and in general much improved from last check. May be more of a benign positional vertigo. Will treat with antivert as needed and rest.  May be reaction to stress. Return  if no improvement in symptoms or if any further symptoms arise. No follow-ups on file. An electronic signature was used to authenticate this note.     --Misha Trimble, DO on 8/11/2020 at 12:47 PM

## 2020-11-09 RX ORDER — LISINOPRIL 20 MG/1
TABLET ORAL
Qty: 90 TABLET | Refills: 1 | Status: SHIPPED | OUTPATIENT
Start: 2020-11-09 | End: 2021-05-06

## 2020-11-09 NOTE — TELEPHONE ENCOUNTER
Bob Carpenter called requesting a refill of the below medication which has been pended for you:     Requested Prescriptions     Pending Prescriptions Disp Refills    lisinopril (PRINIVIL;ZESTRIL) 20 MG tablet [Pharmacy Med Name: LISINOPRIL TABS 20MG] 90 tablet 3     Sig: TAKE 1 TABLET DAILY       Last Appointment Date: 8/11/2020  Next Appointment Date: due in Feb 2021    Allergies   Allergen Reactions    Simvastatin Other (See Comments)     Muscle cramps in legs

## 2020-12-08 RX ORDER — METOPROLOL TARTRATE 50 MG/1
TABLET, FILM COATED ORAL
Qty: 90 TABLET | Refills: 0 | Status: SHIPPED | OUTPATIENT
Start: 2020-12-08 | End: 2021-03-08

## 2020-12-08 NOTE — TELEPHONE ENCOUNTER
Jacob Tsang called requesting a refill of the below medication which has been pended for you:     Requested Prescriptions     Pending Prescriptions Disp Refills    metoprolol tartrate (LOPRESSOR) 50 MG tablet [Pharmacy Med Name: METOPROLOL TARTRATE TABS 50MG] 90 tablet 3     Sig: TAKE 1 TABLET DAILY       Last Appointment Date: 8/11/2020  Next Appointment Date: due Feb 2021    Allergies   Allergen Reactions    Simvastatin Other (See Comments)     Muscle cramps in legs

## 2021-01-04 DIAGNOSIS — E78.2 MIXED HYPERLIPIDEMIA: Primary | ICD-10-CM

## 2021-01-04 DIAGNOSIS — I10 ESSENTIAL HYPERTENSION: ICD-10-CM

## 2021-01-04 DIAGNOSIS — R73.01 IMPAIRED FASTING BLOOD SUGAR: ICD-10-CM

## 2021-01-04 RX ORDER — AMLODIPINE BESYLATE 5 MG/1
TABLET ORAL
Qty: 90 TABLET | Refills: 1 | Status: SHIPPED | OUTPATIENT
Start: 2021-01-04 | End: 2021-07-06

## 2021-01-04 NOTE — TELEPHONE ENCOUNTER
Jose Dickinson called requesting a refill of the below medication which has been pended for you:     Requested Prescriptions     Pending Prescriptions Disp Refills    amLODIPine (NORVASC) 5 MG tablet [Pharmacy Med Name: AMLODIPINE BESYLATE TABS 5MG] 90 tablet 3     Sig: TAKE 1 TABLET DAILY       Last Appointment Date: 8/11/2020  Next Appointment Date: Visit date not found    Allergies   Allergen Reactions    Simvastatin Other (See Comments)     Muscle cramps in legs

## 2021-02-04 RX ORDER — ROSUVASTATIN CALCIUM 20 MG/1
TABLET, COATED ORAL
Qty: 90 TABLET | Refills: 1 | Status: SHIPPED | OUTPATIENT
Start: 2021-02-04 | End: 2021-08-16

## 2021-02-04 NOTE — TELEPHONE ENCOUNTER
Paula Ramsey called requesting a refill of the below medication which has been pended for you:     Requested Prescriptions     Pending Prescriptions Disp Refills    rosuvastatin (CRESTOR) 20 MG tablet [Pharmacy Med Name: ROSUVASTATIN CALCIUM 20 MG TAB] 90 tablet 1     Sig: take 1 tablet by mouth at bedtime       Last Appointment Date: 8/11/2020  Next Appointment Date: 2/9/2021    Allergies   Allergen Reactions    Simvastatin Other (See Comments)     Muscle cramps in legs

## 2021-02-08 ENCOUNTER — HOSPITAL ENCOUNTER (OUTPATIENT)
Dept: LAB | Age: 71
Discharge: HOME | End: 2021-02-08
Payer: MEDICARE

## 2021-02-08 DIAGNOSIS — I10 ESSENTIAL HYPERTENSION: ICD-10-CM

## 2021-02-08 DIAGNOSIS — R73.01 IMPAIRED FASTING BLOOD SUGAR: ICD-10-CM

## 2021-02-08 DIAGNOSIS — E78.2 MIXED HYPERLIPIDEMIA: ICD-10-CM

## 2021-02-08 LAB
ABSOLUTE EOS #: 0.06 K/UL (ref 0–0.44)
ABSOLUTE IMMATURE GRANULOCYTE: <0.03 K/UL (ref 0–0.3)
ABSOLUTE LYMPH #: 1.2 K/UL (ref 1.1–3.7)
ABSOLUTE MONO #: 0.32 K/UL (ref 0.1–1.2)
ALBUMIN SERPL-MCNC: 4.1 G/DL (ref 3.5–5.2)
ALBUMIN/GLOBULIN RATIO: 1.6 (ref 1–2.5)
ALP BLD-CCNC: 75 U/L (ref 35–104)
ALT SERPL-CCNC: 30 U/L (ref 5–33)
ANION GAP SERPL CALCULATED.3IONS-SCNC: 7 MMOL/L (ref 9–17)
AST SERPL-CCNC: 25 U/L
BASOPHILS # BLD: 1 % (ref 0–2)
BASOPHILS ABSOLUTE: 0.04 K/UL (ref 0–0.2)
BILIRUB SERPL-MCNC: 0.37 MG/DL (ref 0.3–1.2)
BUN BLDV-MCNC: 14 MG/DL (ref 8–23)
BUN/CREAT BLD: 24 (ref 9–20)
CALCIUM SERPL-MCNC: 9.6 MG/DL (ref 8.6–10.4)
CHLORIDE BLD-SCNC: 106 MMOL/L (ref 98–107)
CHOLESTEROL/HDL RATIO: 4.8
CHOLESTEROL: 150 MG/DL
CO2: 29 MMOL/L (ref 20–31)
CREAT SERPL-MCNC: 0.58 MG/DL (ref 0.5–0.9)
DIFFERENTIAL TYPE: NORMAL
EOSINOPHILS RELATIVE PERCENT: 1 % (ref 1–4)
GFR AFRICAN AMERICAN: >60 ML/MIN
GFR NON-AFRICAN AMERICAN: >60 ML/MIN
GFR SERPL CREATININE-BSD FRML MDRD: ABNORMAL ML/MIN/{1.73_M2}
GFR SERPL CREATININE-BSD FRML MDRD: ABNORMAL ML/MIN/{1.73_M2}
GLUCOSE BLD-MCNC: 86 MG/DL (ref 70–99)
HCT VFR BLD CALC: 46.1 % (ref 36.3–47.1)
HDLC SERPL-MCNC: 31 MG/DL
HEMOGLOBIN: 14.8 G/DL (ref 11.9–15.1)
IMMATURE GRANULOCYTES: 0 %
LDL CHOLESTEROL: 77 MG/DL (ref 0–130)
LYMPHOCYTES # BLD: 28 % (ref 24–43)
MCH RBC QN AUTO: 29.5 PG (ref 25.2–33.5)
MCHC RBC AUTO-ENTMCNC: 32.1 G/DL (ref 25.2–33.5)
MCV RBC AUTO: 91.8 FL (ref 82.6–102.9)
MONOCYTES # BLD: 7 % (ref 3–12)
NRBC AUTOMATED: 0 PER 100 WBC
PDW BLD-RTO: 12.8 % (ref 11.8–14.4)
PLATELET # BLD: NORMAL K/UL (ref 138–453)
PLATELET ESTIMATE: NORMAL
PLATELET, FLUORESCENCE: 130 K/UL (ref 138–453)
PLATELET, IMMATURE FRACTION: 5.9 % (ref 1.1–10.3)
PMV BLD AUTO: NORMAL FL (ref 8.1–13.5)
POTASSIUM SERPL-SCNC: 4.4 MMOL/L (ref 3.7–5.3)
RBC # BLD: 5.02 M/UL (ref 3.95–5.11)
RBC # BLD: NORMAL 10*6/UL
SEG NEUTROPHILS: 62 % (ref 36–65)
SEGMENTED NEUTROPHILS ABSOLUTE COUNT: 2.7 K/UL (ref 1.5–8.1)
SODIUM BLD-SCNC: 142 MMOL/L (ref 135–144)
TOTAL PROTEIN: 6.7 G/DL (ref 6.4–8.3)
TRIGL SERPL-MCNC: 208 MG/DL
VLDLC SERPL CALC-MCNC: ABNORMAL MG/DL (ref 1–30)
WBC # BLD: 4.3 K/UL (ref 3.5–11.3)
WBC # BLD: NORMAL 10*3/UL

## 2021-02-08 PROCEDURE — 80053 COMPREHEN METABOLIC PANEL: CPT

## 2021-02-08 PROCEDURE — 85055 RETICULATED PLATELET ASSAY: CPT

## 2021-02-08 PROCEDURE — 36415 COLL VENOUS BLD VENIPUNCTURE: CPT

## 2021-02-08 PROCEDURE — 83036 HEMOGLOBIN GLYCOSYLATED A1C: CPT

## 2021-02-08 PROCEDURE — 85025 COMPLETE CBC W/AUTO DIFF WBC: CPT

## 2021-02-08 PROCEDURE — 80061 LIPID PANEL: CPT

## 2021-02-09 ENCOUNTER — VIRTUAL VISIT (OUTPATIENT)
Dept: FAMILY MEDICINE CLINIC | Age: 71
End: 2021-02-09
Payer: MEDICARE

## 2021-02-09 DIAGNOSIS — E78.2 MIXED HYPERLIPIDEMIA: Primary | ICD-10-CM

## 2021-02-09 DIAGNOSIS — G47.33 OSA (OBSTRUCTIVE SLEEP APNEA): ICD-10-CM

## 2021-02-09 DIAGNOSIS — M54.41 CHRONIC BILATERAL LOW BACK PAIN WITH RIGHT-SIDED SCIATICA: ICD-10-CM

## 2021-02-09 DIAGNOSIS — G89.29 CHRONIC BILATERAL LOW BACK PAIN WITH RIGHT-SIDED SCIATICA: ICD-10-CM

## 2021-02-09 DIAGNOSIS — I10 ESSENTIAL HYPERTENSION: ICD-10-CM

## 2021-02-09 DIAGNOSIS — R73.01 IMPAIRED FASTING BLOOD SUGAR: ICD-10-CM

## 2021-02-09 DIAGNOSIS — D69.6 THROMBOCYTOPENIA (HCC): ICD-10-CM

## 2021-02-09 LAB
ESTIMATED AVERAGE GLUCOSE: 105 MG/DL
HBA1C MFR BLD: 5.3 % (ref 4–6)

## 2021-02-09 PROCEDURE — 99214 OFFICE O/P EST MOD 30 MIN: CPT | Performed by: FAMILY MEDICINE

## 2021-02-09 PROCEDURE — 99213 OFFICE O/P EST LOW 20 MIN: CPT

## 2021-02-09 ASSESSMENT — ENCOUNTER SYMPTOMS
WHEEZING: 0
DIARRHEA: 0
BACK PAIN: 1
VOMITING: 0
EYE REDNESS: 0
NAUSEA: 0
COUGH: 0
TROUBLE SWALLOWING: 0
SHORTNESS OF BREATH: 0
SORE THROAT: 0
ABDOMINAL PAIN: 0
CONSTIPATION: 0
EYE DISCHARGE: 0
RHINORRHEA: 0
SINUS PRESSURE: 0

## 2021-02-09 ASSESSMENT — PATIENT HEALTH QUESTIONNAIRE - PHQ9
2. FEELING DOWN, DEPRESSED OR HOPELESS: 0
SUM OF ALL RESPONSES TO PHQ QUESTIONS 1-9: 0
SUM OF ALL RESPONSES TO PHQ QUESTIONS 1-9: 0

## 2021-02-09 NOTE — PROGRESS NOTES
2021    TELEHEALTH EVALUATION -- Audio/Visual (During ZETJV-59 public health emergency)    HPI:    Jaquelin Georges (:  1950) has requested an audio/video evaluation for the following concern(s): Patient is seen today via a video visit for follow up of chronic health issues Patient continues to have ongoing issues with low back pain with right-sided sciatica. The sciatica was not an issue up until recently and now she is starting to get more pain down into her right lower extremity. 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 He will be starting physical therapy at [address] [place] [address] physical therapy in the next couple of weeks so I did suggest perhaps scheduling her to do physical therapy as well. She seems agreeable to doing therapy while he is doing therapy to see if this will help with her low back pain. With regards to her chronic health condition she has a known history of hyperlipidemia and her cholesterol levels are little more elevated than last check. Her triglycerides are little higher with a lower HDL. Did discuss dietary efforts in order to keep this optimally controlled. Still within a reasonable range. Is to continue on her current statin dose. Does have a known history of hypertension and her blood pressure is stable and controlled on her current medical regimen. Has a known history of impaired fasting glucose her blood sugar levels are stable and controlled with dietary efforts. Encourage continued low-carb/low sugar diet and routine exercise to keep this optimally controlled. Does have evidence of thrombocytopenia but this is mildly abnormal.  Would just continue to monitor. Patient not having any signs or symptoms of bleeding. Does have known obstructive sleep apnea and is compliant with her BiPAP use. Is using it nightly and is compliant with its use and is getting ongoing medical benefit. No other acute issues at this time. .  Patient's recent lab reports are as follows:    Results for orders placed or performed during the hospital encounter of 02/08/21   CBC With Auto Differential Result Value Ref Range    WBC 4.3 3.5 - 11.3 k/uL    RBC 5.02 3.95 - 5.11 m/uL    Hemoglobin 14.8 11.9 - 15.1 g/dL    Hematocrit 46.1 36.3 - 47.1 %    MCV 91.8 82.6 - 102.9 fL    MCH 29.5 25.2 - 33.5 pg    MCHC 32.1 25.2 - 33.5 g/dL    RDW 12.8 11.8 - 14.4 %    Platelets See Reflexed IPF Result 138 - 453 k/uL    MPV NOT REPORTED 8.1 - 13.5 fL    NRBC Automated 0.0 0.0 per 100 WBC    Differential Type NOT REPORTED     WBC Morphology NOT REPORTED     RBC Morphology NOT REPORTED     Platelet Estimate NOT REPORTED     Seg Neutrophils 62 36 - 65 %    Lymphocytes 28 24 - 43 %    Monocytes 7 3 - 12 %    Eosinophils % 1 1 - 4 %    Basophils 1 0 - 2 %    Immature Granulocytes 0 0 %    Segs Absolute 2.70 1.50 - 8.10 k/uL    Absolute Lymph # 1.20 1.10 - 3.70 k/uL    Absolute Mono # 0.32 0.10 - 1.20 k/uL    Absolute Eos # 0.06 0.00 - 0.44 k/uL    Basophils Absolute 0.04 0.00 - 0.20 k/uL    Absolute Immature Granulocyte <0.03 0.00 - 0.30 k/uL   Lipid Panel   Result Value Ref Range    Cholesterol 150 <200 mg/dL    HDL 31 (L) >40 mg/dL    LDL Cholesterol 77 0 - 130 mg/dL    Chol/HDL Ratio 4.8 <5    Triglycerides 208 (H) <150 mg/dL    VLDL NOT REPORTED (H) 1 - 30 mg/dL   Comprehensive Metabolic Panel   Result Value Ref Range    Glucose 86 70 - 99 mg/dL    BUN 14 8 - 23 mg/dL    CREATININE 0.58 0.50 - 0.90 mg/dL    Bun/Cre Ratio 24 (H) 9 - 20    Calcium 9.6 8.6 - 10.4 mg/dL    Sodium 142 135 - 144 mmol/L    Potassium 4.4 3.7 - 5.3 mmol/L    Chloride 106 98 - 107 mmol/L    CO2 29 20 - 31 mmol/L    Anion Gap 7 (L) 9 - 17 mmol/L    Alkaline Phosphatase 75 35 - 104 U/L    ALT 30 5 - 33 U/L    AST 25 <32 U/L    Total Bilirubin 0.37 0.3 - 1.2 mg/dL    Total Protein 6.7 6.4 - 8.3 g/dL    Albumin 4.1 3.5 - 5.2 g/dL    Albumin/Globulin Ratio 1.6 1.0 - 2.5    GFR Non-African American >60 >60 mL/min    GFR African American >60 >60 mL/min    GFR Comment          GFR Staging NOT REPORTED    Immature Platelet Fraction   Result Value Ref Range Social History     Tobacco Use    Smoking status: Never Smoker    Smokeless tobacco: Never Used   Substance Use Topics    Alcohol use: Yes     Comment: Rare alcohol use.  Drug use: No        Allergies   Allergen Reactions    Simvastatin Other (See Comments)     Muscle cramps in legs   ,   Past Medical History:   Diagnosis Date    Abnormal liver function tests     Mildly.  Hyperlipidemia     Hypertension     Stable.  Insulin resistance     Macular degeneration of left eye 2013    Obstructive sleep apnea     Currently utilizing BiPAP and EPAP pressure 5, IPAP pressure 9.    Osteoarthritis    ,   Past Surgical History:   Procedure Laterality Date    ABDOMINAL EXPLORATION SURGERY  1994    With left salpingo-oophorectomy.  HYSTEROSCOPY  08/25/2005    With hysteroscopic resection of polyps and submucous fibroid performed by Dr. Kathryn Tavares.  OTHER SURGICAL HISTORY  07/12/2005    Had intraductal papilloma of the right breast excised. Physical Exam  Vitals signs and nursing note reviewed. Constitutional:       General: She is not in acute distress. Appearance: Normal appearance. HENT:      Head: Normocephalic and atraumatic. Right Ear: External ear normal.      Left Ear: External ear normal.      Nose: Nose normal. No congestion or rhinorrhea. Mouth/Throat:      Mouth: Mucous membranes are moist.   Eyes:      General:         Right eye: No discharge. Left eye: No discharge. Extraocular Movements: Extraocular movements intact. Conjunctiva/sclera: Conjunctivae normal.   Neck:      Musculoskeletal: Normal range of motion. Pulmonary:      Effort: Pulmonary effort is normal.   Skin:     Findings: No erythema or rash. Neurological:      Mental Status: She is alert and oriented to person, place, and time. Mental status is at baseline. ASSESSMENT/PLAN:    Encounter Diagnoses   Name Primary?     Mixed hyperlipidemia Yes    Essential hypertension  Impaired fasting blood sugar     Thrombocytopenia (HCC)     Chronic bilateral low back pain with right-sided sciatica     DERIAN (obstructive sleep apnea)      Orders Placed This Encounter   Medications    Respiratory Therapy Supplies MISC     Sig: Bipap supplies as needed     Dispense:  1 each     Refill:  0     Orders Placed This Encounter   Procedures    CBC Auto Differential     Standing Status:   Future     Standing Expiration Date:   2/9/2022    Comprehensive Metabolic Panel     Standing Status:   Future     Standing Expiration Date:   2/9/2022    Hemoglobin A1C     Standing Status:   Future     Standing Expiration Date:   2/9/2022    Lipid Panel     Standing Status:   Future     Standing Expiration Date:   2/9/2022     Order Specific Question:   Is Patient Fasting?/# of Hours     Answer:   12 hours    External Referral To Physical Therapy     Referral Priority:   Routine     Referral Type:   Eval and Treat     Referral Reason:   Specialty Services Required     Requested Specialty:   Physical Therapy     Number of Visits Requested:   1     Patient is given a prescription for her BiPAP supplies. She is using it consistently and is getting ongoing medical benefit. Patient will be referred for physical therapy for help with her low back pain with right-sided sciatica. Patient is to continue to follow a low-carb/low sugar/low-fat diet and increase exercise for optimal blood sugar and cholesterol control. Patient is to continue on the rest of her current medical therapy. No additional changes are made at this time. Patient is to return to my office in 6 months duration or sooner if any further problems or symptoms arise. (Please note that portions of this note were completed with a voice recognition program. Efforts were made to edit the dictations but occasionally words are mis-transcribed.)        No follow-ups on file. Ariel Reid is a 72 y.o. female being evaluated by a Virtual Visit (video visit) encounter to address concerns as mentioned above. A caregiver was present when appropriate. Due to this being a TeleHealth encounter (During LVPAR-68 public health emergency), evaluation of the following organ systems was limited: Vitals/Constitutional/EENT/Resp/CV/GI//MS/Neuro/Skin/Heme-Lymph-Imm. Pursuant to the emergency declaration under the [address] [place] [address], [address] [place] [address] and the [employer] and [employer] Act, this Virtual Visit was conducted with patient's (and/or legal guardian's) consent, to reduce the patient's risk of exposure to COVID-19 and provide necessary medical care. The patient (and/or legal guardian) has also been advised to contact this office for worsening conditions or problems, and seek emergency medical treatment and/or call 911 if deemed necessary. Patient identification was verified at the start of the visit: Yes    Total time spent on this encounter: Not billed by time    Services were provided through a video synchronous discussion virtually to substitute for in-person clinic visit. Patient was in their home setting on their mobile device and I was in my office on a secured video interface. --Home Cisse DO on 2/9/2021 at 7:35 AM    An electronic signature was used to authenticate this note.

## 2021-02-09 NOTE — PATIENT INSTRUCTIONS
Hospital Outpatient Visit on 02/08/2021   Component Date Value Ref Range Status    Hemoglobin A1C 02/08/2021 5.3  4.0 - 6.0 % Final    Estimated Avg Glucose 02/08/2021 105  mg/dL Final    Comment: The ADA and AACC recommend providing the estimated average glucose result to permit better   patient understanding of their HBA1c result.       WBC 02/08/2021 4.3  3.5 - 11.3 k/uL Final    RBC 02/08/2021 5.02  3.95 - 5.11 m/uL Final    Hemoglobin 02/08/2021 14.8  11.9 - 15.1 g/dL Final    Hematocrit 02/08/2021 46.1  36.3 - 47.1 % Final    MCV 02/08/2021 91.8  82.6 - 102.9 fL Final    MCH 02/08/2021 29.5  25.2 - 33.5 pg Final    MCHC 02/08/2021 32.1  25.2 - 33.5 g/dL Final    RDW 02/08/2021 12.8  11.8 - 14.4 % Final    Platelets 04/01/7641 See Reflexed IPF Result  138 - 453 k/uL Final    MPV 02/08/2021 NOT REPORTED  8.1 - 13.5 fL Final    NRBC Automated 02/08/2021 0.0  0.0 per 100 WBC Final    Differential Type 02/08/2021 NOT REPORTED   Final    WBC Morphology 02/08/2021 NOT REPORTED   Final    RBC Morphology 02/08/2021 NOT REPORTED   Final    Platelet Estimate 21/57/1233 NOT REPORTED   Final    Seg Neutrophils 02/08/2021 62  36 - 65 % Final    Lymphocytes 02/08/2021 28  24 - 43 % Final    Monocytes 02/08/2021 7  3 - 12 % Final    Eosinophils % 02/08/2021 1  1 - 4 % Final    Basophils 02/08/2021 1  0 - 2 % Final    Immature Granulocytes 02/08/2021 0  0 % Final    Segs Absolute 02/08/2021 2.70  1.50 - 8.10 k/uL Final    Absolute Lymph # 02/08/2021 1.20  1.10 - 3.70 k/uL Final    Absolute Mono # 02/08/2021 0.32  0.10 - 1.20 k/uL Final    Absolute Eos # 02/08/2021 0.06  0.00 - 0.44 k/uL Final    Basophils Absolute 02/08/2021 0.04  0.00 - 0.20 k/uL Final    Absolute Immature Granulocyte 02/08/2021 <0.03  0.00 - 0.30 k/uL Final    Cholesterol 02/08/2021 150  <200 mg/dL Final    Comment:    Cholesterol Guidelines:      <200  Desirable   200-240  Borderline      >240  Undesirable  HDL 02/08/2021 31* >40 mg/dL Final    Comment:    HDL Guidelines:    <40     Undesirable   40-59    Borderline    >59     Desirable         LDL Cholesterol 02/08/2021 77  0 - 130 mg/dL Final    Comment:    LDL Guidelines:     <100    Desirable   100-129   Near to/above Desirable   130-159   Borderline      >159   Undesirable     Direct (measured) LDL and calculated LDL are not interchangeable tests.  Chol/HDL Ratio 02/08/2021 4.8  <5 Final            Triglycerides 02/08/2021 208* <150 mg/dL Final    Comment:    Triglyceride Guidelines:     <150   Desirable   150-199  Borderline   200-499  High     >499   Very high   Based on AHA Guidelines for fasting triglyceride, October 2012.          VLDL 02/08/2021 NOT REPORTED* 1 - 30 mg/dL Final    Glucose 02/08/2021 86  70 - 99 mg/dL Final    BUN 02/08/2021 14  8 - 23 mg/dL Final    CREATININE 02/08/2021 0.58  0.50 - 0.90 mg/dL Final    Bun/Cre Ratio 02/08/2021 24* 9 - 20 Final    Calcium 02/08/2021 9.6  8.6 - 10.4 mg/dL Final    Sodium 02/08/2021 142  135 - 144 mmol/L Final    Potassium 02/08/2021 4.4  3.7 - 5.3 mmol/L Final    Chloride 02/08/2021 106  98 - 107 mmol/L Final    CO2 02/08/2021 29  20 - 31 mmol/L Final    Anion Gap 02/08/2021 7* 9 - 17 mmol/L Final    Alkaline Phosphatase 02/08/2021 75  35 - 104 U/L Final    ALT 02/08/2021 30  5 - 33 U/L Final    AST 02/08/2021 25  <32 U/L Final    Total Bilirubin 02/08/2021 0.37  0.3 - 1.2 mg/dL Final    Total Protein 02/08/2021 6.7  6.4 - 8.3 g/dL Final    Albumin 02/08/2021 4.1  3.5 - 5.2 g/dL Final    Albumin/Globulin Ratio 02/08/2021 1.6  1.0 - 2.5 Final    GFR Non- 02/08/2021 >60  >60 mL/min Final    GFR  02/08/2021 >60  >60 mL/min Final    GFR Comment 02/08/2021        Final    Comment: Average GFR for 61-76 years old:   80 mL/min/1.73sq m  Chronic Kidney Disease:   <60 mL/min/1.73sq m  Kidney failure:   <15 mL/min/1.73sq m

## 2021-02-10 ENCOUNTER — VIRTUAL VISIT (OUTPATIENT)
Dept: FAMILY MEDICINE CLINIC | Age: 71
End: 2021-02-10
Payer: MEDICARE

## 2021-02-10 VITALS
WEIGHT: 201 LBS | SYSTOLIC BLOOD PRESSURE: 127 MMHG | DIASTOLIC BLOOD PRESSURE: 92 MMHG | BODY MASS INDEX: 35.61 KG/M2 | HEIGHT: 63 IN

## 2021-02-10 DIAGNOSIS — Z00.00 ROUTINE GENERAL MEDICAL EXAMINATION AT A HEALTH CARE FACILITY: Primary | ICD-10-CM

## 2021-02-10 PROCEDURE — G0439 PPPS, SUBSEQ VISIT: HCPCS | Performed by: FAMILY MEDICINE

## 2021-02-10 ASSESSMENT — PATIENT HEALTH QUESTIONNAIRE - PHQ9
1. LITTLE INTEREST OR PLEASURE IN DOING THINGS: 0
SUM OF ALL RESPONSES TO PHQ QUESTIONS 1-9: 0
SUM OF ALL RESPONSES TO PHQ9 QUESTIONS 1 & 2: 0
SUM OF ALL RESPONSES TO PHQ QUESTIONS 1-9: 0

## 2021-02-10 ASSESSMENT — LIFESTYLE VARIABLES: HOW OFTEN DO YOU HAVE A DRINK CONTAINING ALCOHOL: 0

## 2021-02-10 NOTE — PROGRESS NOTES
Medicare Annual Wellness Visit  Name: Jarod England Date: 2/10/2021   MRN: [id number] Sex: Female   Age: 72 y.o. Ethnicity: Non-/Non    : 1950 Race: Osmin Barragan is here for Medicare AWV (subsequent; last 2019)    Screenings for behavioral, psychosocial and functional/safety risks, and cognitive dysfunction are all negative except as indicated below. These results, as well as other patient data from the [address] form, are documented in Flowsheets linked to this Encounter. Allergies   Allergen Reactions    Simvastatin Other (See Comments)     Muscle cramps in legs       Prior to Visit Medications    Medication Sig Taking? Authorizing Provider   Respiratory Therapy Supplies MISC Bipap supplies as needed Yes Ellis Dominguez DO   rosuvastatin (CRESTOR) 20 MG tablet take 1 tablet by mouth at bedtime Yes Ellis Dominguez DO   amLODIPine (NORVASC) 5 MG tablet TAKE 1 TABLET DAILY Yes Ellis Dominguez DO   metoprolol tartrate (LOPRESSOR) 50 MG tablet TAKE 1 TABLET DAILY Yes Desirae Calhoun DO   lisinopril (PRINIVIL;ZESTRIL) 20 MG tablet TAKE 1 TABLET DAILY Yes Desirae Calhoun DO   Omega-3 Fatty Acids (FISH OIL PO) Take by mouth daily Yes Historical Provider, MD   diclofenac-Misoprostol (ARTHROTEC 75) 75-0.2 MG per tablet TAKE 1 TABLET TWICE A DAY  Patient taking differently: 1 tab once daily Yes Ellis Dominguez DO   Multiple Vitamins-Minerals (PRESERVISION AREDS 2 PO) Take 2 tablets by mouth daily Yes Historical Provider, MD       Past Medical History:   Diagnosis Date    Abnormal liver function tests     Mildly.  Hyperlipidemia     Hypertension     Stable.     Insulin resistance     Macular degeneration of left eye     Obstructive sleep apnea     Currently utilizing BiPAP and EPAP pressure 5, IPAP pressure 9.    Osteoarthritis        Past Surgical History:   Procedure Laterality Date   [address] With left salpingo-oophorectomy.  HYSTEROSCOPY  08/25/2005    With hysteroscopic resection of polyps and submucous fibroid performed by Dr. Mary Michel.  OTHER SURGICAL HISTORY  07/12/2005    Had intraductal papilloma of the right breast excised. Family History   Problem Relation Age of Onset    Heart Disease Father     Heart Disease Maternal Grandfather     High Blood Pressure Maternal Grandfather     Stroke Maternal Grandfather     Heart Disease Maternal Grandmother     High Blood Pressure Maternal Grandmother     Stroke Maternal Grandmother     High Blood Pressure Mother     Thyroid Disease Mother     Cancer Mother         Neck    High Blood Pressure Other     Cancer Child         Clavicle. CareTeam (Including outside providers/suppliers regularly involved in providing care):   Patient Care Team:  Ellis Dominguez DO as PCP - General (Family Medicine)  Ellis Dom[facility]    Wt Readings from Last 3 Encounters:   08/11/20 204 lb (92.5 kg)   08/03/20 204 lb 9.6 oz (92.8 kg)   03/02/20 227 lb (103 kg)     There were no vitals filed for this visit. There is no height or weight on file to calculate BMI. Based upon direct observation of the patient, evaluation of cognition reveals recent and remote memory intact. Patient's complete Health Risk Assessment and screening values have been reviewed and are found in Flowsheets. The following problems were reviewed today and where indicated follow up appointments were made and/or referrals ordered. Positive Risk Factor Screenings with Interventions:          General Health and ACP:  General  In general, how would you say your health is?: Very Good  In the past 7 days, have you experienced any of the following?  New or Increased Pain, New or Increased Fatigue, Loneliness, Social Isolation, Stress or Anger?: (!) New or Increased Pain(low back pain-PT ordered at [facility] 2/9/2021) Do you get the social and emotional support that you need?: Yes  Do you have a Living Will?: (!) No  Advance Directives     Power of Douglas Gonzalez Will ACP-Advance Directive ACP-Power of     Not on File Not on File Not on File Not on File      General Health Risk Interventions:  · Pain issues: physical therapy referral ordered  · No Living Will: Advance Care Planning addressed with patient today    Health Habits/Nutrition:  Health Habits/Nutrition  Do you exercise for at least 20 minutes 2-3 times per week?: Yes  Have you lost any weight without trying in the past 3 months?: No  Do you eat only one meal per day?: No  Have you seen the dentist within the past year?: Yes     Health Habits/Nutrition Interventions:  · Nutritional issues:  educational materials for healthy, well-balanced diet provided       Personalized Preventive Plan   Current Health Maintenance Status  Immunization History   Administered Date(s) Administered    Influenza, High Dose (Fluzone 65 yrs and older) 12/01/2016, 10/21/2017, 09/07/2018    Influenza, MDCK Quadv, IM, PF (Flucelvax 4 yrs and older) 11/02/2020    Influenza, Triv, inactivated, subunit, adjuvanted, IM (Fluad 65 yrs and older) 09/26/2019    Pneumococcal Conjugate 13-valent ([id number]) 12/01/2016    Pneumococcal Polysaccharide ([id number]) 03/06/2018    Tdap (Boostrix, Adacel) 01/03/2017    Zoster Live (Zostavax) 01/21/2015    Zoster Recombinant (Shingrix) 05/22/2018, 09/06/2018        Health Maintenance   Topic Date Due    COVID-19 Vaccine (1 of 2) Recommended. Working on scheduling.  DEXA (modify frequency per FRAX score)  Recommended. Patient declines.  Annual Wellness Visit (AWV)  Completed today.     Breast cancer screen  05/08/2021    A1C test (Diabetic or Prediabetic)  02/08/2022    Lipid screen  02/08/2022    Potassium monitoring  02/08/2022    Creatinine monitoring  02/08/2022    Colon cancer screen colonoscopy  06/22/2022  DTaP/Tdap/Td vaccine (2 - Td) 01/03/2027    Flu vaccine  Completed    Shingles Vaccine  Completed    Pneumococcal 65+ years Vaccine  Completed    Hepatitis C screen  Completed    Hepatitis A vaccine  Aged Out    Hepatitis B vaccine  Aged Out    Hib vaccine  Aged Out    Meningococcal (ACWY) vaccine  Aged Out     Recommendations for [employer] Due: see orders and patient instructions/AVS.  . Recommended screening schedule for the next 5-10 years is provided to the patient in written form: see Patient Instructions/AVS.    Chandan Shepard RN, 2/10/2021, performed the documented evaluation under the direct supervision of the attending physician. Kacy Avendano is a 72 y.o. female being evaluated by a Virtual Visit (telephone visit) encounter to address concerns as mentioned above. A caregiver was present when appropriate. Due to this being a TeleHealth encounter ([facility]), evaluation of the following organ systems was limited: Vitals/Constitutional/EENT/Resp/CV/GI//MS/Neuro/Skin/Heme-Lymph-Imm. Pursuant to the emergency declaration under the [address] [place] [address], [address] [place] [address] authority and the [employer] Act, this Virtual Visit was conducted with patient's (and/or legal guardian's) consent, to reduce the patient's risk of exposure to COVID-19 and provide necessary medical care. The patient (and/or legal guardian) has also been advised to contact this office for worsening conditions or problems, and seek emergency medical treatment and/or call 911 if deemed necessary. Patient identification was verified at the start of the visit: Yes    Total time spent for this encounter: Not billed by time    Services were provided through a telephone synchronous discussion virtually to substitute for in-person clinic visit. Patient and provider were located at their individual homes.

## 2021-02-10 NOTE — PATIENT INSTRUCTIONS
Personalized Preventive Plan for Juliann Wong - 2/10/2021  Medicare offers a range of preventive health benefits. Some of the tests and screenings are paid in full while other may be subject to a deductible, co-insurance, and/or copay. Some of these benefits include a comprehensive review of your medical history including lifestyle, illnesses that may run in your family, and various assessments and screenings as appropriate. After reviewing your medical record and screening and assessments performed today your provider may have ordered immunizations, labs, imaging, and/or referrals for you. A list of these orders (if applicable) as well as your Preventive Care list are included within your After Visit Summary for your review. Other Preventive Recommendations:    · A preventive eye exam performed by an eye specialist is recommended every 1-2 years to screen for glaucoma; cataracts, macular degeneration, and other eye disorders. · A preventive dental visit is recommended every 6 months. · Try to get at least 150 minutes of exercise per week or 10,000 steps per day on a pedometer . · Order or download the FREE \"Exercise & Physical Activity: Your Everyday Guide\" from [employer]. Call 6-556.321.8959 or search [employer] online. · You need 0544-9295 mg of calcium and 3469-9768 IU of vitamin D per day. It is possible to meet your calcium requirement with diet alone, but a vitamin D supplement is usually necessary to meet this goal.  · When exposed to the sun, use a sunscreen that protects against both UVA and UVB radiation with an SPF of 30 or greater. Reapply every 2 to 3 hours or after sweating, drying off with a towel, or swimming. · Always wear a seat belt when traveling in a car. Always wear a helmet when riding a bicycle or motorcycle. Heart-Healthy Diet   Sodium, Fat, and Cholesterol Controlled Diet       What Is a Heart Healthy Diet? A heart-healthy diet is one that limits sodium , certain types of fat , and cholesterol . This type of diet is recommended for:   People with any form of cardiovascular disease (eg, coronary heart disease , peripheral vascular disease , previous heart attack , previous stroke )   People with risk factors for cardiovascular disease, such as high blood pressure , high cholesterol , or diabetes   Anyone who wants to lower their risk of developing cardiovascular disease   Sodium    Sodium is a mineral found in many foods. In general, most people consume much more sodium than they need. Diets high in sodium can increase blood pressure and lead to edema (water retention). On a heart-healthy diet, you should consume no more than 2,300 mg (milligrams) of sodium per dayabout the amount in one teaspoon of table salt. The foods highest in sodium include table salt (about 50% sodium), processed foods, convenience foods, and preserved foods. Cholesterol    Cholesterol is a fat-like, waxy substance in your blood. Our bodies make some cholesterol. It is also found in animal products, with the highest amounts in fatty meat, egg yolks, whole milk, cheese, shellfish, and organ meats. On a heart-healthy diet, you should limit your cholesterol intake to less than 200 mg per day. It is normal and important to have some cholesterol in your bloodstream. But too much cholesterol can cause plaque to build up within your arteries, which can eventually lead to a heart attack or stroke. The two types of cholesterol that are most commonly referred to are:   Low-density lipoprotein (LDL) cholesterol  Also known as bad cholesterol, this is the cholesterol that tends to build up along your arteries. Bad cholesterol levels are increased by eating fats that are saturated or hydrogenated. Optimal level of this cholesterol is less than 100. Over 130 starts to get risky for heart disease. High-density lipoprotein (HDL) cholesterol  Also known as good cholesterol, this type of cholesterol actually carries cholesterol away from your arteries and may, therefore, help lower your risk of having a heart attack. You want this level to be high (ideally greater than 60). It is a risk to have a level less than 40. You can raise this good cholesterol by eating olive oil, canola oil, avocados, or nuts. Exercise raises this level, too. Fat    Fat is calorie dense and packs a lot of calories into a small amount of food. Even though fats should be limited due to their high calorie content, not all fats are bad. In fact, some fats are quite healthful. Fat can be broken down into four main types.    The good-for-you fats are:   Monounsaturated fat  found in oils such as olive and canola, avocados, and nuts and natural nut butters; can decrease cholesterol levels, while keeping levels of HDL cholesterol high   Polyunsaturated fat  found in oils such as safflower, sunflower, soybean, corn, and sesame; can decrease total cholesterol and LDL cholesterol   Omega-3 fatty acids  particularly those found in fatty fish (such as salmon, trout, tuna, mackerel, herring, and sardines); can decrease risk of arrhythmias, decrease triglyceride levels, and slightly lower blood pressure   The fats that you want to limit are:   Saturated fat  found in animal products, many fast foods, and a few vegetables; increases total blood cholesterol, including LDL levels   Animal fats that are saturated include: butter, lard, whole-milk dairy products, meat fat, and poultry skin   Vegetable fats that are saturated include: hydrogenated shortening, palm oil, coconut oil, cocoa butter   Hydrogenated or trans fat  found in margarine and vegetable shortening, most shelf stable snack foods, and fried foods; increases LDL and decreases HDL It is generally recommended that you limit your total fat for the day to less than 30% of your total calories. If you follow an 1800-calorie heart healthy diet, for example, this would mean 60 grams of fat or less per day. Saturated fat and trans fat in your diet raises your blood cholesterol the most, much more than dietary cholesterol does. For this reason, on a heart-healthy diet, less than 7% of your calories should come from saturated fat and ideally 0% from trans fat. On an 1800-calorie diet, this translates into less than 14 grams of saturated fat per day, leaving 46 grams of fat to come from mono- and polyunsaturated fats.    Food Choices on a Heart Healthy Diet   Food Category   Foods Recommended   Foods to Avoid   Grains   Breads and rolls without salted tops Most dry and cooked cereals Unsalted crackers and breadsticks Low-sodium or homemade breadcrumbs or stuffing All rice and pastas   Breads, rolls, and crackers with salted tops High-fat baked goods (eg, muffins, donuts, pastries) Quick breads, self-rising flour, and biscuit mixes Regular bread crumbs Instant hot cereals Commercially prepared rice, pasta, or stuffing mixes   Vegetables   Most fresh, frozen, and low-sodium canned vegetables Low-sodium and salt-free vegetable juices Canned vegetables if unsalted or rinsed   Regular canned vegetables and juices, including sauerkraut and pickled vegetables Frozen vegetables with sauces Commercially prepared potato and vegetable mixes   Fruits   Most fresh, frozen, and canned fruits All fruit juices   Fruits processed with salt or sodium   Milk   Nonfat or low-fat (1%) milk Nonfat or low-fat yogurt Cottage cheese, low-fat ricotta, cheeses labeled as low-fat and low-sodium   Whole milk Reduced-fat (2%) milk Malted and chocolate milk Full fat yogurt Most cheeses (unless low-fat and low salt) Buttermilk (no more than 1 cup per week)   Meats and Beans Lean cuts of fresh or frozen beef, veal, lamb, or pork (look for the word loin) Fresh or frozen poultry without the skin Fresh or frozen fish and some shellfish Egg whites and egg substitutes (Limit whole eggs to three per week) Tofu Nuts or seeds (unsalted, dry-roasted), low-sodium peanut butter Dried peas, beans, and lentils   Any smoked, cured, salted, or canned meat, fish, or poultry (including crespo, chipped beef, cold cuts, hot dogs, sausages, sardines, and anchovies) Poultry skins Breaded and/or fried fish or meats Canned peas, beans, and lentils Salted nuts   Fats and Oils   Olive oil and canola oil Low-sodium, low-fat salad dressings and mayonnaise   Butter, margarine, coconut and palm oils, crespo fat   Snacks, Sweets, and Condiments   Low-sodium or unsalted versions of broths, soups, soy sauce, and condiments Pepper, herbs, and spices; vinegar, lemon, or lime juice Low-fat frozen desserts (yogurt, sherbet, fruit bars) Sugar, cocoa powder, honey, syrup, jam, and preserves Low-fat, trans-fat free cookies, cakes, and pies Arturo and animal crackers, fig bars, emily snaps   High-fat desserts Broth, soups, gravies, and sauces, made from instant mixes or other high-sodium ingredients Salted snack foods Canned olives Meat tenderizers, seasoning salt, and most flavored vinegars   Beverages   Low-sodium carbonated beverages Tea and coffee in moderation Soy milk   Commercially softened water   Suggestions   Make whole grains, fruits, and vegetables the base of your diet. Choose heart-healthy fats such as canola, olive, and flaxseed oil, and foods high in heart-healthy fats, such as nuts, seeds, soybeans, tofu, and fish. Eat fish at least twice per week; the fish highest in omega-3 fatty acids and lowest in mercury include salmon, herring, mackerel, sardines, and canned chunk light tuna. If you eat fish less than twice per week or have high triglycerides, talk to your doctor about taking fish oil supplements. Read food labels. For products low in fat and cholesterol, look for fat free, low-fat, cholesterol free, saturated fat free, and trans fat freeAlso scan the Nutrition Facts Label, which lists saturated fat, trans fat, and cholesterol amounts. For products low in sodium, look for sodium free, very low sodium, low sodium, no added salt, and unsalted   Skip the salt when cooking or at the table; if food needs more flavor, get creative and try out different herbs and spices. Garlic and onion also add substantial flavor to foods. Trim any visible fat off meat and poultry before cooking, and drain the fat off after melissa. Use cooking methods that require little or no added fat, such as grilling, boiling, baking, poaching, broiling, roasting, steaming, stir-frying, and sauting. Avoid fast food and convenience food. They tend to be high in saturated and trans fat and have a lot of added salt. Talk to a registered dietitian for individualized diet advice. Last Reviewed: March 2011 Justin Angulo MS, MPH, RD   Updated: 3/29/2011   ·     Keep Your Memory Kermit Meade       Many factors can affect your ability to remembera hectic lifestyle, aging, stress, chronic disease, and certain medicines. But, there are steps you can take to sharpen your mind and help preserve your memory. Challenge Your Brain   Regularly challenging your mind may help keeps it in top shape. Good mental exercises include:   Crossword puzzlesUse a dictionary if you need it; you will learn more that way. Brainteasers Try some!    Crafts, such as wood working and [facility], such as gardening and building model airplanes SocializingVisit old friends or join groups to meet new ones. Reading   Learning a new language   Taking a class, whether it be art history or thuan chi   TravelingExperience the food, history, and culture of your destination   Learning to use a computer   Going to museums, the theater, or thought-provoking movies   Changing things in your daily life, such as reversing your pattern in the grocery store or brushing your teeth using your nondominant hand   Use Memory Aids   There is no need to remember every detail on your own. These memory aids can help:   Calendars and day planners   Electronic organizers to store all sorts of helpful informationThese devices can \"beep\" to remind you of appointments. A book of days to record birthdays, anniversaries, and other occasions that occur on the same date every year   Detailed \"to-do\" lists and strategically placed sticky notes   Quick \"study\" sessionsBefore a gathering, review who will be there so their names will be fresh in your mind. Establish routinesFor example, keep your keys, wallet, and umbrella in the same place all the time or take medicine with your 8:00 AM glass of juice   Live a Healthy Life   Many actions that will keep your body strong will do the same for your mind. For example:   Talk to Your Doctor About Herbs and Supplements    Malnutrition and vitamin deficiencies can impair your mental function. For example, vitamin B12 deficiency can cause a range of symptoms, including confusion. But, what if your nutritional needs are being met? Can herbs and supplements still offer a benefit? Researchers have investigated a range of natural remedies, such as ginkgo , ginseng , and the supplement phosphatidylserine (PS). So far, though, the evidence is inconsistent as to whether these products can improve memory or thinking. Side effects of medicine (eg, dizziness, blurred vision)especially medicines like prescription pain medicines and drugs used to treat mental health conditions   Depending on the brittleness of your bones, the consequences of a fall can be serious and long lasting. Home Life   Research by the Association of Aging [facility]) shows that some home accidents among older adults can be prevented by making simple lifestyle changes and basic modifications and repairs to the home environment. Here are some lifestyle changes that experts recommend:   Have your hearing and vision checked regularly. Be sure to wear prescription glasses that are right for you. Speak to your doctor or pharmacist about the possible side effects of your medicines. A number of medicines can cause dizziness. If you have problems with sleep, talk to your doctor. Limit your intake of alcohol. If necessary, use a cane or walker to help maintain your balance. Wear supportive, rubber-soled shoes, even at home. If you live in a region that gets wintry weather, you may want to put special cleats on your shoes to prevent you from slipping on the snow and ice. Exercise regularly to help maintain muscle tone, agility, and balance. Always hold the banister when going up or down stairs. Also, use  bars when getting in or out of the bath or shower, or using the toilet. To avoid dizziness, get up slowly from a lying down position. Sit up first, dangling your legs for a minute or two before rising to a standing position. Overall Home Safety Check   According to the Consumer Product Safety Commision's \"Older Consumer Home Safety Checklist,\" it is important to check for potential hazards in each room. And remember, proper lighting is an essential factor in home safety. If you cannot see clearly, you are more likely to fall.    Important questions to ask yourself include: Look for coffee pots, kettles and toaster ovens with automatic shut-offs. Keep a mop handy in the kitchen so you can wipe up spills instantly. You should also have a small fire extinguisher. Arrange your kitchen with frequently used items on lower shelves to avoid the need to stand on a stepstool to reach them. Make sure countertops are well-lit to avoid injuries while cutting and preparing food. In the Bathroom    Use a non-slip mat or decals in the tub and shower, since wet, soapy tile or porcelain surfaces are extremely slippery. Make sure bathroom rugs are non-skid or tape them firmly to the floor. Bathtubs should have at least one, preferably two, grab bars, firmly attached to structural supports in the wall. (Do not use built-in soap holders or glass shower doors as grab bars.)    Tub seats fitted with non-slip material on the legs allow you to wash sitting down. For people with limited mobility, bathtub transfer benches allow you to slide safely into the tub. Raised toilet seats and toilet safety rails are helpful for those with knee or hip problems. In the [facility]    Make sure you use a nightlight and that the area around your bed is clear of potential obstacles. Be careful with electric blankets and never go to sleep with a heating pad, which can cause serious burns even if on a low setting. Use fire-resistant mattress covers and pillows, and NEVER smoke in bed. Keep a phone next to the bed that is programmed to dial 911 at the push of a button. If you have a chronic condition, you may want to sign on with an automatic call-in service. Typically the system includes a small pendant that connects directly to an emergency medical voice-response system. You should also make arrangements to stay in contact with someonefriend, neighbor, family memberon a regular schedule.    Fire Prevention According to the National S.A.F.E. (Smoke Alarms for Every) [facility] [facility], senior citizens are one of the two highest risk groups for death and serious injuries due to residential fires. When cooking, wear short-sleeved items, never a bulky long-sleeved robe. The [facility]'s Safety Checklist for Older Consumers emphasizes the importance of checking basements, garages, workshops and storage areas for fire hazards, such as volatile liquids, piles of old rags or clothing and overloaded circuits. Never smoke in bed or when lying down on a couch or recliner chair. Small portable electric or kerosene heaters are responsible for many home fires and should be used cautiously if at all. If you do use one, be sure to keep them away from flammable materials. In case of fire, make sure you have a pre-established emergency exit plan. Have a professional check your fireplace and other fuel-burning appliances yearly. Hel[employer]   Baby boomers entering the garcia years will continue to see the development of new products to help older adults live safely and independently in spite of age-related changes. Making Life More Livable  , by Jeoffrey Monday, lists over 1,000 products for \"living well in the mature years,\" such as bathing and mobility aids, household security devices, ergonomically designed knives and peelers, and faucet valves and knobs for temperature control. Medical supply stores and organizations are good sources of information about products that improve your quality of life and insure your safety. Last Reviewed: November 2009 Lety Valverde MD   Updated: 3/7/2011     ·        Learning About Living Forrest Coreas  What is a living will? A living will, also called a declaration, is a legal form. It tells your family and your doctor your wishes when you can't speak for yourself. It's used by the health professionals who will treat you as you near the end of your life or if you get seriously hurt or ill. If you put your wishes in writing, your loved ones and others will know what kind of care you want. They won't need to guess. This can ease your mind and be helpful to others. And you can change or cancel your living will at any time. A living will is not the same as an estate or property will. An estate will explains what you want to happen with your money and property after you die. How do you use it? A living will is used to describe the kinds of treatment or life support you want as you near the end of your life or if you get seriously hurt or ill. Keep these facts in mind about living reynolds. Your living will is used only if you can't speak or make decisions for yourself. Most often, one or more doctors must certify that you can't speak or decide for yourself before your living will takes effect. If you get better and can speak for yourself again, you can accept or refuse any treatment. It doesn't matter what you said in your living will. Some states may limit your right to refuse treatment in certain cases. For example, you may need to clearly state in your living will that you don't want artificial hydration and nutrition, such as being fed through a tube. Is a living will a legal document? A living will is a legal document. Each state has its own laws about living reynolds. And a living will may be called something else in your state. Here are some things to know about living reynolds. You don't need an  to complete a living will. But legal advice can be helpful if your state's laws are unclear. It can also help if your health history is complicated or your family can't agree on what should be in your living will. You can change your living will at any time. Some people find that their wishes about end-of-life care change as their health changes. If you make big changes to your living will, complete a new form. If you move to another state, make sure that your living will is legal in the state where you now live. In most cases, doctors will respect your wishes even if you have a form from a different state. You might use a universal form that has been approved by many states. This kind of form can sometimes be filled out and stored online. Your digital copy will then be available wherever you have a connection to the internet. The doctors and nurses who need to treat you can find it right away. Your state may offer an online registry. This is another place where you can store your living will online. It's a good idea to get your living will notarized. This means using a person called a KeyCAPTCHA to watch two people sign, or witness, your living will. What should you know when you create a living will? Here are some questions to ask yourself as you make your living will:  Do you know enough about life support methods that might be used? If not, talk to your doctor so you know what might be done if you can't breathe on your own, your heart stops, or you can't swallow. What things would you still want to be able to do after you receive life-support methods? Would you want to be able to walk? To speak? To eat on your own? To live without the help of machines? Do you want certain Temple practices performed if you become very ill? If you have a choice, where do you want to be cared for? In your home? At a hospital or nursing home? If you have a choice at the end of your life, where would you prefer to die? At home? In a hospital or nursing home? Somewhere else? Would you prefer to be buried or cremated? Do you want your organs to be donated after you die? What should you do with your living will? Make sure that your family members and your health care agent have copies of your living will (also called a declaration). Give your doctor a copy of your living will. Ask him or her to keep it as part of your medical record. If you have more than one doctor, make sure that each one has a copy. Put a copy of your living will where it can be easily found. For example, some people may put a copy on their refrigerator door. If you are using a digital copy, be sure your doctor, family members, and health care agent know how to find and access it. Where can you learn more? Go to [email or web address] and sign in to your [employer] account. Enter P814 in the PlaceFirst box to learn more about \"Learning About Living Michael Campbell. \"     If you do not have an account, please click on the \"Sign Up Now\" link. Current as of: December 9, 2019               Content Version: 12.6  © 0149-5336 [employer]. Care instructions adapted under license by [facility] ([facility]). If you have questions about a medical condition or this instruction, always ask your healthcare professional. [place] [address] any warranty or liability for your use of this information.     ·

## 2021-02-18 ENCOUNTER — IMMUNIZATION (OUTPATIENT)
Dept: LAB | Age: 71
End: 2021-02-18
Payer: MEDICARE

## 2021-02-18 PROCEDURE — 91301 COVID-19, MODERNA VACCINE 100MCG/0.5ML DOSE: CPT

## 2021-03-08 DIAGNOSIS — I10 ESSENTIAL HYPERTENSION: ICD-10-CM

## 2021-03-09 RX ORDER — METOPROLOL TARTRATE 50 MG/1
TABLET, FILM COATED ORAL
Qty: 90 TABLET | Refills: 1 | Status: SHIPPED | OUTPATIENT
Start: 2021-03-09 | End: 2021-09-07

## 2021-03-18 ENCOUNTER — IMMUNIZATION (OUTPATIENT)
Dept: LAB | Age: 71
End: 2021-03-18
Payer: MEDICARE

## 2021-03-18 PROCEDURE — 91301 COVID-19, MODERNA VACCINE 100MCG/0.5ML DOSE: CPT

## 2021-05-06 RX ORDER — LISINOPRIL 20 MG/1
TABLET ORAL
Qty: 90 TABLET | Refills: 3 | Status: SHIPPED | OUTPATIENT
Start: 2021-05-06 | End: 2022-05-02

## 2021-05-06 NOTE — TELEPHONE ENCOUNTER
Rena Kinney called requesting a refill of the below medication which has been pended for you:     Requested Prescriptions     Pending Prescriptions Disp Refills    lisinopril (PRINIVIL;ZESTRIL) 20 MG tablet [Pharmacy Med Name: LISINOPRIL TABS 20MG] 90 tablet 3     Sig: TAKE 1 TABLET DAILY       Last Appointment Date: 2/10/2021  Next Appointment Date: 8/10/2021    Allergies   Allergen Reactions    Simvastatin Other (See Comments)     Muscle cramps in legs

## 2021-05-07 NOTE — TELEPHONE ENCOUNTER
Cooper Aguilar called requesting a refill of the below medication which has been pended for you:     Requested Prescriptions     Pending Prescriptions Disp Refills    diclofenac-miSOPROStol (ARTHROTEC 75) 75-0.2 MG per tablet 180 tablet 1     Si tab once daily       Last Appointment Date: 2/10/2021  Next Appointment Date: 8/10/2021    Allergies   Allergen Reactions    Simvastatin Other (See Comments)     Muscle cramps in legs

## 2021-05-10 RX ORDER — DICLOFENAC SODIUM AND MISOPROSTOL 75; 200 MG/1; UG/1
TABLET, DELAYED RELEASE ORAL
Qty: 90 TABLET | Refills: 1 | Status: SHIPPED | OUTPATIENT
Start: 2021-05-10 | End: 2021-11-08

## 2021-07-06 RX ORDER — AMLODIPINE BESYLATE 5 MG/1
TABLET ORAL
Qty: 90 TABLET | Refills: 0 | Status: SHIPPED | OUTPATIENT
Start: 2021-07-06 | End: 2021-10-01

## 2021-07-06 NOTE — TELEPHONE ENCOUNTER
Bob Carpenter called requesting a refill of the below medication which has been pended for you:     Requested Prescriptions     Pending Prescriptions Disp Refills    amLODIPine (NORVASC) 5 MG tablet [Pharmacy Med Name: AMLODIPINE BESYLATE TABS 5MG] 90 tablet 3     Sig: TAKE 1 TABLET DAILY       Last Appointment Date: 2/10/2021  Next Appointment Date: 8/10/2021    Allergies   Allergen Reactions    Simvastatin Other (See Comments)     Muscle cramps in legs

## 2021-08-06 ENCOUNTER — HOSPITAL ENCOUNTER (OUTPATIENT)
Dept: LAB | Age: 71
Discharge: HOME | End: 2021-08-06
Payer: MEDICARE

## 2021-08-06 DIAGNOSIS — R73.01 IMPAIRED FASTING BLOOD SUGAR: ICD-10-CM

## 2021-08-06 DIAGNOSIS — E78.2 MIXED HYPERLIPIDEMIA: ICD-10-CM

## 2021-08-06 DIAGNOSIS — I10 ESSENTIAL HYPERTENSION: ICD-10-CM

## 2021-08-06 LAB
ABSOLUTE EOS #: 0.03 K/UL (ref 0–0.44)
ABSOLUTE IMMATURE GRANULOCYTE: <0.03 K/UL (ref 0–0.3)
ABSOLUTE LYMPH #: 0.94 K/UL (ref 1.1–3.7)
ABSOLUTE MONO #: 0.31 K/UL (ref 0.1–1.2)
ALBUMIN SERPL-MCNC: 4.3 G/DL (ref 3.5–5.2)
ALBUMIN/GLOBULIN RATIO: 1.5 (ref 1–2.5)
ALP BLD-CCNC: 78 U/L (ref 35–104)
ALT SERPL-CCNC: 30 U/L (ref 5–33)
ANION GAP SERPL CALCULATED.3IONS-SCNC: 9 MMOL/L (ref 9–17)
AST SERPL-CCNC: 21 U/L
BASOPHILS # BLD: 1 % (ref 0–2)
BASOPHILS ABSOLUTE: 0.03 K/UL (ref 0–0.2)
BILIRUB SERPL-MCNC: 0.42 MG/DL (ref 0.3–1.2)
BUN BLDV-MCNC: 15 MG/DL (ref 8–23)
BUN/CREAT BLD: 27 (ref 9–20)
CALCIUM SERPL-MCNC: 10 MG/DL (ref 8.6–10.4)
CHLORIDE BLD-SCNC: 106 MMOL/L (ref 98–107)
CHOLESTEROL/HDL RATIO: 4.4
CHOLESTEROL: 144 MG/DL
CO2: 26 MMOL/L (ref 20–31)
CREAT SERPL-MCNC: 0.56 MG/DL (ref 0.5–0.9)
DIFFERENTIAL TYPE: ABNORMAL
EOSINOPHILS RELATIVE PERCENT: 1 % (ref 1–4)
GFR AFRICAN AMERICAN: >60 ML/MIN
GFR NON-AFRICAN AMERICAN: >60 ML/MIN
GFR SERPL CREATININE-BSD FRML MDRD: ABNORMAL ML/MIN/{1.73_M2}
GFR SERPL CREATININE-BSD FRML MDRD: ABNORMAL ML/MIN/{1.73_M2}
GLUCOSE BLD-MCNC: 96 MG/DL (ref 70–99)
HCT VFR BLD CALC: 45.7 % (ref 36.3–47.1)
HDLC SERPL-MCNC: 33 MG/DL
HEMOGLOBIN: 15.2 G/DL (ref 11.9–15.1)
IMMATURE GRANULOCYTES: 0 %
LDL CHOLESTEROL: 70 MG/DL (ref 0–130)
LYMPHOCYTES # BLD: 32 % (ref 24–43)
MCH RBC QN AUTO: 29.2 PG (ref 25.2–33.5)
MCHC RBC AUTO-ENTMCNC: 33.3 G/DL (ref 25.2–33.5)
MCV RBC AUTO: 87.9 FL (ref 82.6–102.9)
MONOCYTES # BLD: 11 % (ref 3–12)
NRBC AUTOMATED: 0 PER 100 WBC
PDW BLD-RTO: 12.4 % (ref 11.8–14.4)
PLATELET # BLD: 109 K/UL (ref 138–453)
PLATELET ESTIMATE: ABNORMAL
PMV BLD AUTO: 10.9 FL (ref 8.1–13.5)
POTASSIUM SERPL-SCNC: 4.3 MMOL/L (ref 3.7–5.3)
RBC # BLD: 5.2 M/UL (ref 3.95–5.11)
RBC # BLD: ABNORMAL 10*6/UL
SEG NEUTROPHILS: 55 % (ref 36–65)
SEGMENTED NEUTROPHILS ABSOLUTE COUNT: 1.64 K/UL (ref 1.5–8.1)
SODIUM BLD-SCNC: 141 MMOL/L (ref 135–144)
TOTAL PROTEIN: 7.1 G/DL (ref 6.4–8.3)
TRIGL SERPL-MCNC: 205 MG/DL
VLDLC SERPL CALC-MCNC: ABNORMAL MG/DL (ref 1–30)
WBC # BLD: 3 K/UL (ref 3.5–11.3)
WBC # BLD: ABNORMAL 10*3/UL

## 2021-08-06 PROCEDURE — 83036 HEMOGLOBIN GLYCOSYLATED A1C: CPT

## 2021-08-06 PROCEDURE — 80053 COMPREHEN METABOLIC PANEL: CPT

## 2021-08-06 PROCEDURE — 36415 COLL VENOUS BLD VENIPUNCTURE: CPT

## 2021-08-06 PROCEDURE — 85025 COMPLETE CBC W/AUTO DIFF WBC: CPT

## 2021-08-06 PROCEDURE — 80061 LIPID PANEL: CPT

## 2021-08-08 LAB
ESTIMATED AVERAGE GLUCOSE: 108 MG/DL
HBA1C MFR BLD: 5.4 % (ref 4–6)

## 2021-08-10 ENCOUNTER — TELEPHONE (OUTPATIENT)
Dept: FAMILY MEDICINE CLINIC | Age: 71
End: 2021-08-10

## 2021-08-10 ENCOUNTER — OFFICE VISIT (OUTPATIENT)
Dept: FAMILY MEDICINE CLINIC | Age: 71
End: 2021-08-10
Payer: MEDICARE

## 2021-08-10 VITALS
TEMPERATURE: 97.3 F | HEIGHT: 63 IN | DIASTOLIC BLOOD PRESSURE: 84 MMHG | OXYGEN SATURATION: 98 % | RESPIRATION RATE: 10 BRPM | HEART RATE: 52 BPM | SYSTOLIC BLOOD PRESSURE: 128 MMHG | BODY MASS INDEX: 35.67 KG/M2 | WEIGHT: 201.3 LBS

## 2021-08-10 DIAGNOSIS — R73.01 IMPAIRED FASTING BLOOD SUGAR: ICD-10-CM

## 2021-08-10 DIAGNOSIS — G47.33 OSA (OBSTRUCTIVE SLEEP APNEA): ICD-10-CM

## 2021-08-10 DIAGNOSIS — L72.9 SCALP CYST: ICD-10-CM

## 2021-08-10 DIAGNOSIS — Z12.31 ENCOUNTER FOR SCREENING MAMMOGRAM FOR MALIGNANT NEOPLASM OF BREAST: ICD-10-CM

## 2021-08-10 DIAGNOSIS — E78.2 MIXED HYPERLIPIDEMIA: ICD-10-CM

## 2021-08-10 DIAGNOSIS — I10 ESSENTIAL HYPERTENSION: Primary | ICD-10-CM

## 2021-08-10 DIAGNOSIS — D69.6 THROMBOCYTOPENIA (HCC): ICD-10-CM

## 2021-08-10 PROCEDURE — 99214 OFFICE O/P EST MOD 30 MIN: CPT | Performed by: FAMILY MEDICINE

## 2021-08-10 PROCEDURE — G0463 HOSPITAL OUTPT CLINIC VISIT: HCPCS | Performed by: FAMILY MEDICINE

## 2021-08-10 PROCEDURE — 99397 PER PM REEVAL EST PAT 65+ YR: CPT | Performed by: FAMILY MEDICINE

## 2021-08-10 SDOH — ECONOMIC STABILITY: FOOD INSECURITY: WITHIN THE PAST 12 MONTHS, YOU WORRIED THAT YOUR FOOD WOULD RUN OUT BEFORE YOU GOT MONEY TO BUY MORE.: NEVER TRUE

## 2021-08-10 SDOH — ECONOMIC STABILITY: FOOD INSECURITY: WITHIN THE PAST 12 MONTHS, THE FOOD YOU BOUGHT JUST DIDN'T LAST AND YOU DIDN'T HAVE MONEY TO GET MORE.: NEVER TRUE

## 2021-08-10 ASSESSMENT — ENCOUNTER SYMPTOMS
EYE REDNESS: 0
SHORTNESS OF BREATH: 0
EYE DISCHARGE: 0
RHINORRHEA: 0
ROS SKIN COMMENTS: CYST ON SCALP
WHEEZING: 0
CONSTIPATION: 0
COUGH: 0
ABDOMINAL PAIN: 0
DIARRHEA: 0
VOMITING: 0
SINUS PRESSURE: 0
SORE THROAT: 0
TROUBLE SWALLOWING: 0
NAUSEA: 0

## 2021-08-10 ASSESSMENT — SOCIAL DETERMINANTS OF HEALTH (SDOH): HOW HARD IS IT FOR YOU TO PAY FOR THE VERY BASICS LIKE FOOD, HOUSING, MEDICAL CARE, AND HEATING?: NOT HARD AT ALL

## 2021-08-10 NOTE — TELEPHONE ENCOUNTER
Patient is scheduled to see Dr. Jade Huber Sept 29th. Their office is requesting patients diclofenac be held for cyst removal procedure. Please advise.

## 2021-08-10 NOTE — PROGRESS NOTES
8/10/2021     Eldon Valenzuela (:  1950) is a 80 y.o. female, here for evaluation of the following medical concerns:    HPI  Patient comes in today for follow up of chronic health issues Patient does have a cyst to the left frontal scalp region that is getting more bothersome to her. She has is becoming more prominent and, and her brushing her hair. Would like to see about getting this removed I did recommend referral to surgery for opinion about getting this excised. She does have a known history of hypertension her blood pressure is stable and controlled on her current medical therapy. Has known hyperlipidemia and her cholesterol levels do show high triglycerides with a low HDL. This is fairly stable from previous checks. Did discuss lipid-lowering diet in order to keep this optimally controlled. Has a known history of impaired fasting glucose her blood sugar level remains stable and controlled with dietary efforts. Would recommend low-carb/low sugar diet to keep this optimally controlled. Does have known chronic thrombocytopenia and her platelet count is stable compared to previous checks but she does now have a low white blood cell count with a high hemoglobin. Will need to monitor this. She is asymptomatic at this time but may need to consider hematology opinion if this is a persistent change. She does have known obstructive sleep apnea and does use her CPAP machine consistently. Is getting ongoing medical benefit from its use. Patient otherwise has no other acute medical concerns. .  Patient's recent lab reports are as follows:    Results for orders placed or performed during the hospital encounter of 21   Lipid Panel   Result Value Ref Range    Cholesterol 144 <200 mg/dL    HDL 33 (L) >40 mg/dL    LDL Cholesterol 70 0 - 130 mg/dL    Chol/HDL Ratio 4.4 <5    Triglycerides 205 (H) <150 mg/dL    VLDL NOT REPORTED (H) 1 - 30 mg/dL   Hemoglobin A1C   Result Value Ref Range    Hemoglobin A1C 5.4 4.0 - 6.0 %    Estimated Avg Glucose 108 mg/dL   Comprehensive Metabolic Panel   Result Value Ref Range    Glucose 96 70 - 99 mg/dL    BUN 15 8 - 23 mg/dL    CREATININE 0.56 0.50 - 0.90 mg/dL    Bun/Cre Ratio 27 (H) 9 - 20    Calcium 10.0 8.6 - 10.4 mg/dL    Sodium 141 135 - 144 mmol/L    Potassium 4.3 3.7 - 5.3 mmol/L    Chloride 106 98 - 107 mmol/L    CO2 26 20 - 31 mmol/L    Anion Gap 9 9 - 17 mmol/L    Alkaline Phosphatase 78 35 - 104 U/L    ALT 30 5 - 33 U/L    AST 21 <32 U/L    Total Bilirubin 0.42 0.3 - 1.2 mg/dL    Total Protein 7.1 6.4 - 8.3 g/dL    Albumin 4.3 3.5 - 5.2 g/dL    Albumin/Globulin Ratio 1.5 1.0 - 2.5    GFR Non-African American >60 >60 mL/min    GFR African American >60 >60 mL/min    GFR Comment          GFR Staging NOT REPORTED    CBC Auto Differential   Result Value Ref Range    WBC 3.0 (L) 3.5 - 11.3 k/uL    RBC 5.20 (H) 3.95 - 5.11 m/uL    Hemoglobin 15.2 (H) 11.9 - 15.1 g/dL    Hematocrit 45.7 36.3 - 47.1 %    MCV 87.9 82.6 - 102.9 fL    MCH 29.2 25.2 - 33.5 pg    MCHC 33.3 25.2 - 33.5 g/dL    RDW 12.4 11.8 - 14.4 %    Platelets 502 (L) 214 - 453 k/uL    MPV 10.9 8.1 - 13.5 fL    NRBC Automated 0.0 0.0 per 100 WBC    Differential Type NOT REPORTED     Seg Neutrophils 55 36 - 65 %    Lymphocytes 32 24 - 43 %    Monocytes 11 3 - 12 %    Eosinophils % 1 1 - 4 %    Basophils 1 0 - 2 %    Immature Granulocytes 0 0 %    Segs Absolute 1.64 1.50 - 8.10 k/uL    Absolute Lymph # 0.94 (L) 1.10 - 3.70 k/uL    Absolute Mono # 0.31 0.10 - 1.20 k/uL    Absolute Eos # 0.03 0.00 - 0.44 k/uL    Basophils Absolute 0.03 0.00 - 0.20 k/uL    Absolute Immature Granulocyte <0.03 0.00 - 0.30 k/uL    WBC Morphology NOT REPORTED     RBC Morphology NOT REPORTED     Platelet Estimate NOT REPORTED       Other review of systems are as noted below. Preventative measures are reviewed today. See health maintenance section for complete preventative plan of care.     Did review patient's med list, allergies, social history, fam history, pmhx and pshx today as noted in the record. Review of Systems   Constitutional: Negative for chills, fatigue and fever. HENT: Negative for congestion, ear pain, postnasal drip, rhinorrhea, sinus pressure, sore throat and trouble swallowing. Eyes: Negative for discharge and redness. Respiratory: Negative for cough, shortness of breath and wheezing. Cardiovascular: Negative for chest pain. Gastrointestinal: Negative for abdominal pain, constipation, diarrhea, nausea and vomiting. Genitourinary: Negative for dysuria, flank pain, frequency and urgency. Musculoskeletal: Negative for arthralgias, myalgias and neck pain. Skin: Negative for rash and wound. Cyst on scalp   Allergic/Immunologic: Negative for environmental allergies. Neurological: Negative for dizziness, weakness, light-headedness and headaches. Hematological: Negative for adenopathy. Psychiatric/Behavioral: Negative. Prior to Visit Medications    Medication Sig Taking? Authorizing Provider   amLODIPine (NORVASC) 5 MG tablet TAKE 1 TABLET DAILY  Desirae Calhoun, DO   diclofenac-miSOPROStol (ARTHROTEC 75) 75-0.2 MG per tablet 1 tab once daily  Desirae Calhoun, DO   lisinopril (PRINIVIL;ZESTRIL) 20 MG tablet TAKE 1 TABLET DAILY  Lurdes Lora, DO   metoprolol tartrate (LOPRESSOR) 50 MG tablet TAKE 1 TABLET DAILY  Lurdes Lora, DO   Respiratory Therapy Supplies MISC Bipap supplies as needed  Lurdes Lora, DO   rosuvastatin (CRESTOR) 20 MG tablet take 1 tablet by mouth at bedtime  Lurdes Lora, DO   Omega-3 Fatty Acids (FISH OIL PO) Take by mouth daily  Historical Provider, MD   Multiple Vitamins-Minerals (PRESERVISION AREDS 2 PO) Take 2 tablets by mouth daily  Historical Provider, MD        Social History     Tobacco Use    Smoking status: Never Smoker    Smokeless tobacco: Never Used   Substance Use Topics    Alcohol use: Yes     Comment: Rare alcohol use.         There were no vitals filed for this visit. Estimated body mass index is 35.61 kg/m² as calculated from the following:    Height as of 2/10/21: 5' 3\" (1.6 m). Weight as of 2/10/21: 201 lb (91.2 kg). Physical Exam  Vitals and nursing note reviewed. Constitutional:       General: She is not in acute distress. Appearance: Normal appearance. She is well-developed. She is not diaphoretic. HENT:      Head: Normocephalic and atraumatic. Right Ear: Tympanic membrane, ear canal and external ear normal.      Left Ear: Tympanic membrane, ear canal and external ear normal.      Nose: Nose normal.      Mouth/Throat:      Mouth: Mucous membranes are moist.      Pharynx: Oropharynx is clear. No oropharyngeal exudate. Eyes:      General:         Right eye: No discharge. Left eye: No discharge. Conjunctiva/sclera: Conjunctivae normal.      Pupils: Pupils are equal, round, and reactive to light. Neck:      Thyroid: No thyromegaly. Cardiovascular:      Rate and Rhythm: Normal rate and regular rhythm. Heart sounds: Normal heart sounds. Pulmonary:      Effort: Pulmonary effort is normal.      Breath sounds: Normal breath sounds. No wheezing or rales. Abdominal:      General: Bowel sounds are normal. There is no distension. Palpations: Abdomen is soft. Tenderness: There is no abdominal tenderness. Musculoskeletal:      Cervical back: Normal range of motion and neck supple. Lymphadenopathy:      Cervical: No cervical adenopathy. Skin:     General: Skin is warm and dry. Findings: No rash. Comments: Left frontal scalp region with cystic lesion present that is about 1 cm in diameter   Neurological:      Mental Status: She is alert and oriented to person, place, and time. Psychiatric:         Behavior: Behavior normal.         Thought Content: Thought content normal.         Judgment: Judgment normal.           ASSESSMENT/PLAN:  Encounter Diagnoses   Name Primary?     Essential hypertension Yes    Mixed hyperlipidemia     Impaired fasting blood sugar     Thrombocytopenia (HCC)     DERIAN (obstructive sleep apnea)     Scalp cyst     Encounter for screening mammogram for malignant neoplasm of breast      No orders of the defined types were placed in this encounter. Orders Placed This Encounter   Procedures    ASPEN VIVIANE DIGITAL SCREEN BILATERAL     Standing Status:   Future     Standing Expiration Date:   10/10/2022     Order Specific Question:   Reason for exam:     Answer:   screening    CBC Auto Differential     Standing Status:   Future     Standing Expiration Date:   8/10/2022    Comprehensive Metabolic Panel     Standing Status:   Future     Standing Expiration Date:   8/10/2022    Hemoglobin A1C     Standing Status:   Future     Standing Expiration Date:   8/10/2022    Lipid Panel     Standing Status:   Future     Standing Expiration Date:   8/10/2022     Order Specific Question:   Is Patient Fasting?/# of Hours     Answer:   12 hours   Carmel Mora MD, General Surgery, [place]     Referral Priority:   Routine     Referral Type:   Eval and Treat     Referral Reason:   Specialty Services Required     Referred to Provider:   Isabel Sharp MD     Requested Specialty:   General Surgery     Number of Visits Requested:   1     Patient is referred to the surgeon for excision of the scalp cyst.    Patient is to continue on her current medical regimen. No changes are made at this time. Patient did state that she still having ongoing low back pain issues but did just start seeing a chiropractor. We will see if this provides her with any benefit. Has completed course of physical therapy. If she has persistent issues may need to consider further imaging and specialist referral.    Patient is to follow a low-carb/low sugar/low-fat diet and increase exercise for optimal blood sugar and cholesterol control.     Patient is to return to my office in 6 months duration or sooner if any further problems or symptoms arise. (Please note that portions of this note were completed with a voice recognition program. Efforts were made to edit the dictations but occasionally words are mis-transcribed.)        No follow-ups on file. An electronic signature was used to authenticate this note.     --Eliot Bland,  on 8/10/2021 at 7:52 AM

## 2021-08-10 NOTE — PATIENT INSTRUCTIONS
Hospital Outpatient Visit on 08/06/2021   Component Date Value Ref Range Status    Cholesterol 08/06/2021 144  <200 mg/dL Final    Comment:    Cholesterol Guidelines:      <200  Desirable   200-240  Borderline      >240  Undesirable         HDL 08/06/2021 33* >40 mg/dL Final    Comment:    HDL Guidelines:    <40     Undesirable   40-59    Borderline    >59     Desirable         LDL Cholesterol 08/06/2021 70  0 - 130 mg/dL Final    Comment:    LDL Guidelines:     <100    Desirable   100-129   Near to/above Desirable   130-159   Borderline      >159   Undesirable     Direct (measured) LDL and calculated LDL are not interchangeable tests.  Chol/HDL Ratio 08/06/2021 4.4  <5 Final            Triglycerides 08/06/2021 205* <150 mg/dL Final    Comment:    Triglyceride Guidelines:     <150   Desirable   150-199  Borderline   200-499  High     >499   Very high   Based on AHA Guidelines for fasting triglyceride, October 2012.  VLDL 08/06/2021 NOT REPORTED* 1 - 30 mg/dL Final    Hemoglobin A1C 08/06/2021 5.4  4.0 - 6.0 % Final    Estimated Avg Glucose 08/06/2021 108  mg/dL Final    Comment: The ADA and AACC recommend providing the estimated average glucose result to permit better   patient understanding of their HBA1c result.       Glucose 08/06/2021 96  70 - 99 mg/dL Final    BUN 08/06/2021 15  8 - 23 mg/dL Final    CREATININE 08/06/2021 0.56  0.50 - 0.90 mg/dL Final    Bun/Cre Ratio 08/06/2021 27* 9 - 20 Final    Calcium 08/06/2021 10.0  8.6 - 10.4 mg/dL Final    Sodium 08/06/2021 141  135 - 144 mmol/L Final    Potassium 08/06/2021 4.3  3.7 - 5.3 mmol/L Final    Chloride 08/06/2021 106  98 - 107 mmol/L Final    CO2 08/06/2021 26  20 - 31 mmol/L Final    Anion Gap 08/06/2021 9  9 - 17 mmol/L Final    Alkaline Phosphatase 08/06/2021 78  35 - 104 U/L Final    ALT 08/06/2021 30  5 - 33 U/L Final    AST 08/06/2021 21  <32 U/L Final    Total Bilirubin 08/06/2021 0.42  0.3 - 1.2 mg/dL Final    Total Protein 08/06/2021 7.1  6.4 - 8.3 g/dL Final    Albumin 08/06/2021 4.3  3.5 - 5.2 g/dL Final    Albumin/Globulin Ratio 08/06/2021 1.5  1.0 - 2.5 Final    GFR Non- 08/06/2021 >60  >60 mL/min Final    GFR  08/06/2021 >60  >60 mL/min Final    GFR Comment 08/06/2021        Final    Comment: Average GFR for 79or more years old:   76 mL/min/1.73sq m  Chronic Kidney Disease:   <60 mL/min/1.73sq m  Kidney failure:   <15 mL/min/1.73sq m              eGFR calculated using average adult body mass.  Additional eGFR calculator available at:        [email or web address]            GFR Staging 08/06/2021 NOT REPORTED   Final    WBC 08/06/2021 3.0* 3.5 - 11.3 k/uL Final    RBC 08/06/2021 5.20* 3.95 - 5.11 m/uL Final    Hemoglobin 08/06/2021 15.2* 11.9 - 15.1 g/dL Final    Hematocrit 08/06/2021 45.7  36.3 - 47.1 % Final    MCV 08/06/2021 87.9  82.6 - 102.9 fL Final    MCH 08/06/2021 29.2  25.2 - 33.5 pg Final    MCHC 08/06/2021 33.3  25.2 - 33.5 g/dL Final    RDW 08/06/2021 12.4  11.8 - 14.4 % Final    Platelets 80/36/7678 109* 138 - 453 k/uL Final    MPV 08/06/2021 10.9  8.1 - 13.5 fL Final    NRBC Automated 08/06/2021 0.0  0.0 per 100 WBC Final    Differential Type 08/06/2021 NOT REPORTED   Final    Seg Neutrophils 08/06/2021 55  36 - 65 % Final    Lymphocytes 08/06/2021 32  24 - 43 % Final    Monocytes 08/06/2021 11  3 - 12 % Final    Eosinophils % 08/06/2021 1  1 - 4 % Final    Basophils 08/06/2021 1  0 - 2 % Final    Immature Granulocytes 08/06/2021 0  0 % Final    Segs Absolute 08/06/2021 1.64  1.50 - 8.10 k/uL Final    Absolute Lymph # 08/06/2021 0.94* 1.10 - 3.70 k/uL Final    Absolute Mono # 08/06/2021 0.31  0.10 - 1.20 k/uL Final    Absolute Eos # 08/06/2021 0.03  0.00 - 0.44 k/uL Final    Basophils Absolute 08/06/2021 0.03  0.00 - 0.20 k/uL Final    Absolute Immature Granulocyte 08/06/2021 <0.03  0.00 - 0.30 k/uL Final    WBC Morphology 08/06/2021 NOT REPORTED   Final    RBC Morphology 08/06/2021 NOT REPORTED   Final    Platelet Estimate 54/56/7203 NOT REPORTED   Final

## 2021-08-16 RX ORDER — ROSUVASTATIN CALCIUM 20 MG/1
TABLET, COATED ORAL
Qty: 90 TABLET | Refills: 0 | Status: SHIPPED | OUTPATIENT
Start: 2021-08-16 | End: 2021-11-24

## 2021-08-16 NOTE — TELEPHONE ENCOUNTER
Maren Book called requesting a refill of the below medication which has been pended for you:     Requested Prescriptions     Pending Prescriptions Disp Refills    rosuvastatin (CRESTOR) 20 MG tablet [Pharmacy Med Name: ROSUVASTATIN CALCIUM 20 MG TAB] 90 tablet 1     Sig: take 1 tablet by mouth at bedtime       Last Appointment Date: 8/10/2021  Next Appointment Date: 2/14/2022    Allergies   Allergen Reactions    Simvastatin Other (See Comments)     Muscle cramps in legs

## 2021-09-04 DIAGNOSIS — I10 ESSENTIAL HYPERTENSION: ICD-10-CM

## 2021-09-07 RX ORDER — METOPROLOL TARTRATE 50 MG/1
TABLET, FILM COATED ORAL
Qty: 90 TABLET | Refills: 1 | Status: SHIPPED | OUTPATIENT
Start: 2021-09-07 | End: 2022-03-03

## 2021-09-07 NOTE — TELEPHONE ENCOUNTER
Alyssa Valles called requesting a refill of the below medication which has been pended for you:     Requested Prescriptions     Pending Prescriptions Disp Refills    metoprolol tartrate (LOPRESSOR) 50 MG tablet [Pharmacy Med Name: METOPROLOL TARTRATE TABS 50MG] 90 tablet 3     Sig: TAKE 1 TABLET DAILY       Last Appointment Date: 8/10/2021  Next Appointment Date: 2/14/2022    Allergies   Allergen Reactions    Simvastatin Other (See Comments)     Muscle cramps in legs

## 2021-09-15 ENCOUNTER — TELEPHONE (OUTPATIENT)
Dept: FAMILY MEDICINE CLINIC | Age: 71
End: 2021-09-15

## 2021-09-15 NOTE — TELEPHONE ENCOUNTER
Called patient and reminded her she is due for her mammogram. Patient willing to set up so transferred call to radiology scheduling.

## 2021-09-25 ENCOUNTER — HOSPITAL ENCOUNTER (OUTPATIENT)
Dept: MAMMOGRAPHY | Age: 71
Discharge: HOME | End: 2021-09-27
Payer: MEDICARE

## 2021-09-25 DIAGNOSIS — Z12.31 ENCOUNTER FOR SCREENING MAMMOGRAM FOR MALIGNANT NEOPLASM OF BREAST: ICD-10-CM

## 2021-09-25 PROCEDURE — 77063 BREAST TOMOSYNTHESIS BI: CPT

## 2021-09-27 ENCOUNTER — HOSPITAL ENCOUNTER (OUTPATIENT)
Age: 71
Setting detail: SPECIMEN
Discharge: HOME | End: 2021-09-27
Payer: MEDICARE

## 2021-09-27 ENCOUNTER — PROCEDURE VISIT (OUTPATIENT)
Dept: SURGERY | Age: 71
End: 2021-09-27
Payer: MEDICARE

## 2021-09-27 VITALS
WEIGHT: 206.8 LBS | TEMPERATURE: 98.3 F | BODY MASS INDEX: 36.64 KG/M2 | RESPIRATION RATE: 16 BRPM | SYSTOLIC BLOOD PRESSURE: 132 MMHG | DIASTOLIC BLOOD PRESSURE: 86 MMHG | HEIGHT: 63 IN | HEART RATE: 76 BPM

## 2021-09-27 DIAGNOSIS — D23.4 OTHER BENIGN NEOPLASM OF SKIN OF SCALP AND NECK: ICD-10-CM

## 2021-09-27 DIAGNOSIS — G89.18 POST-OP PAIN: Primary | ICD-10-CM

## 2021-09-27 DIAGNOSIS — L72.9 SCALP CYST: ICD-10-CM

## 2021-09-27 PROCEDURE — 11422 EXC H-F-NK-SP B9+MARG 1.1-2: CPT | Performed by: SURGERY

## 2021-09-27 PROCEDURE — 88304 TISSUE EXAM BY PATHOLOGIST: CPT

## 2021-09-27 RX ORDER — HYDROCODONE BITARTRATE AND ACETAMINOPHEN 5; 325 MG/1; MG/1
1 TABLET ORAL EVERY 6 HOURS PRN
Qty: 15 TABLET | Refills: 0 | Status: SHIPPED | OUTPATIENT
Start: 2021-09-27 | End: 2021-09-30

## 2021-09-27 NOTE — PROGRESS NOTES
PROCEDURE NOTE:    Patient is a 77 y.o. female here to have  Scalp cysts removed and/or evaluated. Noted for year. Symptoms include getting bigger and irritated    EXAM:     1.5 cm cyst ant left scalp  2.5 cm cyst post right scalp      PROCEDURE: 1.5 cm csyt ant left scalp    The area was prepped and draped in a sterile fashion. 1 % lidocaine with epi was used for local anesthetic. A #15 blade scalpel was used to excise the lesion. The length was 1.5cm, and the width was 0.5cm. The wound was closed with 3-0 and 4-0 nylon vertical mattress sutures. Incision was covered with dressing. PROCEDURE: 2.5 cm cyst post right scalp    Excised in the same manner as above. Incision was 2.6 cm L and 1 cm W      IMPRESSION:    1.  2 scalp cyst      PLAN:    1. Lesions excised today  2. RTC in 1 week to have sutures removed and to go over pathology.           Electronically signed by Noelle Interiano MD on 9/27/2021 at 3:15 PM

## 2021-09-27 NOTE — PATIENT INSTRUCTIONS
Follow up as scheduled for suture removal.    Patient Education        Scalp Cut Closed With Staples or Stitches: Care Instructions  Your Care Instructions     A scalp laceration is a cut on your head. You may be able to see the cut, or it may be covered by your hair. The cut may throb or feel tender, and you may have a headache. The doctor used staples or stitches to close the cut. This helps the cut heal and reduces scarring. Your doctor will tell you when to have your stitches or staples removed. This is usually in 7 to 14 days. How long you'll be told to wait depends on where the cut is located, how big and how deep the cut is, and what your general health is like. Your scalp may itch as it heals. This is more likely if the doctor trimmed or shaved your hair in order to place the staples or stitches. The doctor has checked you carefully, but problems can develop later. If you notice any problems or new symptoms, get medical treatment right away. Follow-up care is a key part of your treatment and safety. Be sure to make and go to all appointments, and call your doctor if you are having problems. It's also a good idea to know your test results and keep a list of the medicines you take. How can you care for yourself at home? · Keep the cut dry for the first 24 to 48 hours. After this, you can shower if your doctor okays it. Pat the cut dry. · Don't soak the cut, such as in a bathtub. Your doctor will tell you when it's safe to get the cut wet. · If your doctor told you how to care for your cut, follow your doctor's instructions. If you did not get instructions, follow this general advice:  ? After the first 24 to 48 hours, wash around the cut with clean water 2 times a day. Don't use hydrogen peroxide or alcohol, which can slow healing. ? You may cover the cut with a thin layer of petroleum jelly, such as Vaseline. ? Apply more petroleum jelly as needed.   · Avoid any activity that could cause your cut to reopen. · Do not remove the staples or stitches on your own. Your doctor will tell you when to come back to have them removed. · Be safe with medicines. Read and follow all instructions on the label. ? If the doctor gave you a prescription medicine for pain, take it as prescribed. ? If you are not taking a prescription pain medicine, ask your doctor if you can take an over-the-counter medicine. When should you call for help? Call 911 anytime you think you may need emergency care. For example, call if:    · Blood is pumping from the cut or does not stop or slow down with pressure. Call your doctor now or seek immediate medical care if:    · You have new pain or your pain gets worse.     · You have tingling, weakness, or numbness near the cut.     · The cut starts to bleed a lot. Oozing small amounts of blood is normal.     · You have symptoms of infection, such as:  ? Increased pain, swelling, warmth, or redness around the cut.  ? Red streaks leading from the cut.  ? Pus draining from the cut.  ? A fever. Watch closely for changes in your health, and be sure to contact your doctor if:    · The cut reopens.     · You do not get better as expected. Where can you learn more? Go to [email or web address]://[email or web address] org and sign in to your [employer] account. Kimi Lin in the [facility] box to learn more about \"Scalp Cut Closed With Staples or Stitches: Care Instructions. \"     If you do not have an account, please click on the \"Sign Up Now\" link. Current as of: July 1, 2021               Content Version: 13.0  © 7274-7251 [employer]orporated. Care instructions adapted under license by [facility] ([facility]). If you have questions about a medical condition or this instruction, always ask your healthcare professional. [place] [address] any warranty or liability for your use of this information.

## 2021-09-29 LAB — DERMATOLOGY PATHOLOGY REPORT: NORMAL

## 2021-10-01 RX ORDER — AMLODIPINE BESYLATE 5 MG/1
TABLET ORAL
Qty: 90 TABLET | Refills: 1 | Status: SHIPPED | OUTPATIENT
Start: 2021-10-01 | End: 2022-03-30

## 2021-10-01 NOTE — TELEPHONE ENCOUNTER
Toña Bundy called requesting a refill of the below medication which has been pended for you:     Requested Prescriptions     Pending Prescriptions Disp Refills    amLODIPine (NORVASC) 5 MG tablet [Pharmacy Med Name: AMLODIPINE BESYLATE TABS 5MG] 90 tablet 1     Sig: TAKE 1 TABLET DAILY       Last Appointment Date: 8/10/2021  Next Appointment Date: 2/14/2022    Allergies   Allergen Reactions    Simvastatin Other (See Comments)     Muscle cramps in legs

## 2021-10-04 ENCOUNTER — NURSE ONLY (OUTPATIENT)
Dept: SURGERY | Age: 71
End: 2021-10-04
Payer: MEDICARE

## 2021-10-04 VITALS
HEIGHT: 63 IN | DIASTOLIC BLOOD PRESSURE: 84 MMHG | BODY MASS INDEX: 36.04 KG/M2 | SYSTOLIC BLOOD PRESSURE: 132 MMHG | WEIGHT: 203.4 LBS | HEART RATE: 64 BPM | OXYGEN SATURATION: 96 %

## 2021-11-08 RX ORDER — DICLOFENAC SODIUM AND MISOPROSTOL 75; 200 MG/1; UG/1
TABLET, DELAYED RELEASE ORAL
Qty: 90 TABLET | Refills: 1 | Status: SHIPPED | OUTPATIENT
Start: 2021-11-08 | End: 2022-05-09

## 2021-11-08 NOTE — TELEPHONE ENCOUNTER
Paula Echavarria called requesting a refill of the below medication which has been pended for you:     Requested Prescriptions     Pending Prescriptions Disp Refills    diclofenac-miSOPROStol (ARTHROTEC 75) 75-0.2 MG per tablet [Pharmacy Med Name: DICLOFENAC/MISOPROSTOL TABS 75/.2MG] 90 tablet 1     Sig: TAKE 1 TABLET DAILY       Last Appointment Date: 8/10/2021  Next Appointment Date: 2/14/2022    Allergies   Allergen Reactions    Simvastatin Other (See Comments)     Muscle cramps in legs

## 2021-11-30 RX ORDER — SUMATRIPTAN 50 MG/1
50 TABLET, FILM COATED ORAL
Qty: 9 TABLET | Refills: 3 | Status: SHIPPED | OUTPATIENT
Start: 2021-11-30 | End: 2022-09-02

## 2022-02-11 ENCOUNTER — HOSPITAL ENCOUNTER (OUTPATIENT)
Dept: LAB | Age: 72
Discharge: HOME | End: 2022-02-11
Payer: MEDICARE

## 2022-02-11 DIAGNOSIS — I10 ESSENTIAL HYPERTENSION: ICD-10-CM

## 2022-02-11 DIAGNOSIS — R73.01 IMPAIRED FASTING BLOOD SUGAR: ICD-10-CM

## 2022-02-11 DIAGNOSIS — E78.2 MIXED HYPERLIPIDEMIA: ICD-10-CM

## 2022-02-11 LAB
ABSOLUTE EOS #: 0.04 K/UL (ref 0–0.44)
ABSOLUTE IMMATURE GRANULOCYTE: <0.03 K/UL (ref 0–0.3)
ABSOLUTE LYMPH #: 1.13 K/UL (ref 1.1–3.7)
ABSOLUTE MONO #: 0.38 K/UL (ref 0.1–1.2)
ALBUMIN SERPL-MCNC: 4.3 G/DL (ref 3.5–5.2)
ALBUMIN/GLOBULIN RATIO: 1.7 (ref 1–2.5)
ALP BLD-CCNC: 80 U/L (ref 35–104)
ALT SERPL-CCNC: 24 U/L (ref 5–33)
ANION GAP SERPL CALCULATED.3IONS-SCNC: 9 MMOL/L (ref 9–17)
AST SERPL-CCNC: 18 U/L
BASOPHILS # BLD: 1 % (ref 0–2)
BASOPHILS ABSOLUTE: 0.03 K/UL (ref 0–0.2)
BILIRUB SERPL-MCNC: 0.46 MG/DL (ref 0.3–1.2)
BUN BLDV-MCNC: 14 MG/DL (ref 8–23)
BUN/CREAT BLD: 24 (ref 9–20)
CALCIUM SERPL-MCNC: 9.7 MG/DL (ref 8.6–10.4)
CHLORIDE BLD-SCNC: 106 MMOL/L (ref 98–107)
CO2: 29 MMOL/L (ref 20–31)
CREAT SERPL-MCNC: 0.59 MG/DL (ref 0.5–0.9)
DIFFERENTIAL TYPE: ABNORMAL
EOSINOPHILS RELATIVE PERCENT: 1 % (ref 1–4)
ESTIMATED AVERAGE GLUCOSE: 117 MG/DL
GFR AFRICAN AMERICAN: >60 ML/MIN
GFR NON-AFRICAN AMERICAN: >60 ML/MIN
GFR SERPL CREATININE-BSD FRML MDRD: ABNORMAL ML/MIN/{1.73_M2}
GFR SERPL CREATININE-BSD FRML MDRD: ABNORMAL ML/MIN/{1.73_M2}
GLUCOSE BLD-MCNC: 90 MG/DL (ref 70–99)
HBA1C MFR BLD: 5.7 % (ref 4–6)
HCT VFR BLD CALC: 46.5 % (ref 36.3–47.1)
HEMOGLOBIN: 15.2 G/DL (ref 11.9–15.1)
IMMATURE GRANULOCYTES: 0 %
LYMPHOCYTES # BLD: 28 % (ref 24–43)
MCH RBC QN AUTO: 29.2 PG (ref 25.2–33.5)
MCHC RBC AUTO-ENTMCNC: 32.7 G/DL (ref 25.2–33.5)
MCV RBC AUTO: 89.3 FL (ref 82.6–102.9)
MONOCYTES # BLD: 9 % (ref 3–12)
NRBC AUTOMATED: 0 PER 100 WBC
PDW BLD-RTO: 12.5 % (ref 11.8–14.4)
PLATELET # BLD: ABNORMAL K/UL (ref 138–453)
PLATELET ESTIMATE: ABNORMAL
PLATELET, FLUORESCENCE: 140 K/UL (ref 138–453)
PLATELET, IMMATURE FRACTION: 4.8 % (ref 1.1–10.3)
PMV BLD AUTO: ABNORMAL FL (ref 8.1–13.5)
POTASSIUM SERPL-SCNC: 4.5 MMOL/L (ref 3.7–5.3)
RBC # BLD: 5.21 M/UL (ref 3.95–5.11)
RBC # BLD: ABNORMAL 10*6/UL
SEG NEUTROPHILS: 61 % (ref 36–65)
SEGMENTED NEUTROPHILS ABSOLUTE COUNT: 2.45 K/UL (ref 1.5–8.1)
SODIUM BLD-SCNC: 144 MMOL/L (ref 135–144)
TOTAL PROTEIN: 6.9 G/DL (ref 6.4–8.3)
WBC # BLD: 4 K/UL (ref 3.5–11.3)
WBC # BLD: ABNORMAL 10*3/UL

## 2022-02-11 PROCEDURE — 85025 COMPLETE CBC W/AUTO DIFF WBC: CPT

## 2022-02-11 PROCEDURE — 80053 COMPREHEN METABOLIC PANEL: CPT

## 2022-02-11 PROCEDURE — 36415 COLL VENOUS BLD VENIPUNCTURE: CPT

## 2022-02-11 PROCEDURE — 80061 LIPID PANEL: CPT

## 2022-02-11 PROCEDURE — 85055 RETICULATED PLATELET ASSAY: CPT

## 2022-02-11 PROCEDURE — 83036 HEMOGLOBIN GLYCOSYLATED A1C: CPT

## 2022-02-12 LAB
CHOLESTEROL/HDL RATIO: 4.6
CHOLESTEROL: 172 MG/DL
HDLC SERPL-MCNC: 37 MG/DL
LDL CHOLESTEROL: 94 MG/DL (ref 0–130)
TRIGL SERPL-MCNC: 206 MG/DL
VLDLC SERPL CALC-MCNC: ABNORMAL MG/DL (ref 1–30)

## 2022-02-14 ENCOUNTER — OFFICE VISIT (OUTPATIENT)
Dept: FAMILY MEDICINE CLINIC | Age: 72
End: 2022-02-14
Payer: MEDICARE

## 2022-02-14 VITALS
WEIGHT: 214 LBS | HEART RATE: 57 BPM | BODY MASS INDEX: 37.92 KG/M2 | TEMPERATURE: 98.8 F | SYSTOLIC BLOOD PRESSURE: 138 MMHG | DIASTOLIC BLOOD PRESSURE: 88 MMHG | HEIGHT: 63 IN | OXYGEN SATURATION: 96 %

## 2022-02-14 DIAGNOSIS — D69.6 THROMBOCYTOPENIA (HCC): ICD-10-CM

## 2022-02-14 DIAGNOSIS — G47.33 OSA (OBSTRUCTIVE SLEEP APNEA): ICD-10-CM

## 2022-02-14 DIAGNOSIS — I10 ESSENTIAL HYPERTENSION: Primary | ICD-10-CM

## 2022-02-14 DIAGNOSIS — R73.01 IMPAIRED FASTING BLOOD SUGAR: ICD-10-CM

## 2022-02-14 DIAGNOSIS — E66.01 SEVERE OBESITY (BMI 35.0-39.9) WITH COMORBIDITY (HCC): ICD-10-CM

## 2022-02-14 DIAGNOSIS — E78.2 MIXED HYPERLIPIDEMIA: ICD-10-CM

## 2022-02-14 PROCEDURE — 99214 OFFICE O/P EST MOD 30 MIN: CPT | Performed by: FAMILY MEDICINE

## 2022-02-14 PROCEDURE — 99212 OFFICE O/P EST SF 10 MIN: CPT

## 2022-02-14 RX ORDER — ROSUVASTATIN CALCIUM 20 MG/1
TABLET, COATED ORAL
Qty: 90 TABLET | Refills: 1 | Status: SHIPPED | OUTPATIENT
Start: 2022-02-14 | End: 2022-07-29

## 2022-02-14 ASSESSMENT — ENCOUNTER SYMPTOMS
SHORTNESS OF BREATH: 0
TROUBLE SWALLOWING: 0
EYE REDNESS: 0
EYE DISCHARGE: 0
VOMITING: 0
CONSTIPATION: 0
WHEEZING: 0
RHINORRHEA: 0
NAUSEA: 0
ABDOMINAL PAIN: 0
COUGH: 0
SINUS PRESSURE: 0
DIARRHEA: 0
SORE THROAT: 0

## 2022-02-14 ASSESSMENT — PATIENT HEALTH QUESTIONNAIRE - PHQ9
SUM OF ALL RESPONSES TO PHQ QUESTIONS 1-9: 0
SUM OF ALL RESPONSES TO PHQ9 QUESTIONS 1 & 2: 0
1. LITTLE INTEREST OR PLEASURE IN DOING THINGS: 0
2. FEELING DOWN, DEPRESSED OR HOPELESS: 0
SUM OF ALL RESPONSES TO PHQ QUESTIONS 1-9: 0

## 2022-02-14 NOTE — PROGRESS NOTES
2022     Negro Tijerina (:  1950) is a 80 y.o. female, here for evaluation of the following medical concerns:    HPI  Patient comes in today for follow up of chronic health issues Patient is doing reasonably well overall. 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 Did become a little bit tearful with discussing this today. Reassurance is provided. She feels that she overall is handling it well but does have some bad days more than others. She does have a known history of hypertension and her blood pressure is stable and controlled on her current medical therapy. Has a known history of hyperlipidemia and does have high triglycerides with a low HDL but her other cholesterol values are within acceptable range. Did discuss lipid-lowering diet in order to keep this optimally controlled. Has a known history of impaired fasting glucose and her blood sugar level is stable and controlled with dietary efforts. Does have a known history of obstructive sleep apnea and does consistently use her CPAP machine. Is getting ongoing medical benefit from its use. Has a known history of thrombocytopenia and her platelet counts are stable and within normal limits. Does have a known history of severe obesity with a BMI of 37. Does have difficulty with weight loss due to her situational stress. Weight loss would benefit her overall health conditions so encouraged continued healthy dietary intake and maintaining increased exercise as able to help with her weight management. Patient otherwise has no other acute medical concerns. .  Patient's recent lab reports are as follows:    Results for orders placed or performed during the hospital encounter of 22 Lipid Panel   Result Value Ref Range    Cholesterol 172 <200 mg/dL    HDL 37 (L) >40 mg/dL    LDL Cholesterol 94 0 - 130 mg/dL    Chol/HDL Ratio 4.6 <5    Triglycerides 206 (H) <150 mg/dL    VLDL NOT REPORTED (H) 1 - 30 mg/dL   Hemoglobin A1C   Result Value Ref Range    Hemoglobin A1C 5.7 4.0 - 6.0 %    Estimated Avg Glucose 117 mg/dL   Comprehensive Metabolic Panel   Result Value Ref Range    Glucose 90 70 - 99 mg/dL    BUN 14 8 - 23 mg/dL    CREATININE 0.59 0.50 - 0.90 mg/dL    Bun/Cre Ratio 24 (H) 9 - 20    Calcium 9.7 8.6 - 10.4 mg/dL    Sodium 144 135 - 144 mmol/L    Potassium 4.5 3.7 - 5.3 mmol/L    Chloride 106 98 - 107 mmol/L    CO2 29 20 - 31 mmol/L    Anion Gap 9 9 - 17 mmol/L    Alkaline Phosphatase 80 35 - 104 U/L    ALT 24 5 - 33 U/L    AST 18 <32 U/L    Total Bilirubin 0.46 0.3 - 1.2 mg/dL    Total Protein 6.9 6.4 - 8.3 g/dL    Albumin 4.3 3.5 - 5.2 g/dL    Albumin/Globulin Ratio 1.7 1.0 - 2.5    GFR Non-African American >60 >60 mL/min    GFR African American >60 >60 mL/min    GFR Comment          GFR Staging NOT REPORTED    CBC Auto Differential   Result Value Ref Range    WBC 4.0 3.5 - 11.3 k/uL    RBC 5.21 (H) 3.95 - 5.11 m/uL    Hemoglobin 15.2 (H) 11.9 - 15.1 g/dL    Hematocrit 46.5 36.3 - 47.1 %    MCV 89.3 82.6 - 102.9 fL    MCH 29.2 25.2 - 33.5 pg    MCHC 32.7 25.2 - 33.5 g/dL    RDW 12.5 11.8 - 14.4 %    Platelets See Reflexed IPF Result 138 - 453 k/uL    MPV NOT REPORTED 8.1 - 13.5 fL    NRBC Automated 0.0 0.0 per 100 WBC    Differential Type NOT REPORTED     WBC Morphology NOT REPORTED     RBC Morphology NOT REPORTED     Platelet Estimate NOT REPORTED     Seg Neutrophils 61 36 - 65 %    Lymphocytes 28 24 - 43 %    Monocytes 9 3 - 12 %    Eosinophils % 1 1 - 4 %    Basophils 1 0 - 2 %    Immature Granulocytes 0 0 %    Segs Absolute 2.45 1.50 - 8.10 k/uL    Absolute Lymph # 1.13 1.10 - 3.70 k/uL    Absolute Mono # 0.38 0.10 - 1.20 k/uL    Absolute Eos # 0.04 0.00 - 0.44 k/uL    Basophils Absolute 0.03 0.00 - 0.20 k/uL    Absolute Immature Granulocyte <0.03 0.00 - 0.30 k/uL   Immature Platelet Fraction   Result Value Ref Range    Platelet, Immature Fraction 4.8 1.1 - 10.3 %    Platelet, Fluorescence 140 138 - 453 k/uL      Other review of systems are as noted below. Preventative measures are reviewed today. See health maintenance section for complete preventative plan of care. Did review patient's med list, allergies, social history, fam history, pmhx and pshx today as noted in the record. Review of Systems   Constitutional: Negative for chills, fatigue and fever. HENT: Negative for congestion, ear pain, postnasal drip, rhinorrhea, sinus pressure, sore throat and trouble swallowing. Eyes: Negative for discharge and redness. Respiratory: Negative for cough, shortness of breath and wheezing. Cardiovascular: Negative for chest pain. Gastrointestinal: Negative for abdominal pain, constipation, diarrhea, nausea and vomiting. Genitourinary: Negative for dysuria, flank pain, frequency and urgency. Musculoskeletal: Negative for arthralgias, myalgias and neck pain. Skin: Negative for rash and wound. Allergic/Immunologic: Negative for environmental allergies. Neurological: Negative for dizziness, weakness, light-headedness and headaches. Hematological: Negative for adenopathy. Psychiatric/Behavioral: Negative. Prior to Visit Medications    Medication Sig Taking?  Authorizing Provider   SUMAtriptan (IMITREX) 50 MG tablet Take 1 tablet by mouth once as needed for Migraine May repeat an additional dose if needed after 2 hours  Nirav Peels, DO   rosuvastatin (CRESTOR) 20 MG tablet take 1 tablet by mouth at bedtime  REBECCA Perez - CNP   diclofenac-miSOPROStol (ARTHROTEC 75) 75-0.2 MG per tablet TAKE 1 TABLET DAILY  Nirav Peels, DO   amLODIPine (NORVASC) 5 MG tablet TAKE 1 TABLET DAILY  Nirav Peels, DO   metoprolol tartrate (LOPRESSOR) 50 MG tablet TAKE 1 TABLET DAILY  Windell Fitting, DO   lisinopril (PRINIVIL;ZESTRIL) 20 MG tablet TAKE 1 TABLET DAILY  Windell Fitting, DO   Respiratory Therapy Supplies MISC Bipap supplies as needed  Windell Fitting, DO   Omega-3 Fatty Acids (FISH OIL PO) Take by mouth daily  Historical Provider, MD   Multiple Vitamins-Minerals (PRESERVISION AREDS 2 PO) Take 2 tablets by mouth daily  Historical Provider, MD        Social History     Tobacco Use    Smoking status: Never Smoker    Smokeless tobacco: Never Used   Substance Use Topics    Alcohol use: Yes     Comment: Rare alcohol use. There were no vitals filed for this visit. Estimated body mass index is 36.03 kg/m² as calculated from the following:    Height as of 10/4/21: 5' 3\" (1.6 m). Weight as of 10/4/21: 203 lb 6.4 oz (92.3 kg). Physical Exam  Vitals and nursing note reviewed. Constitutional:       General: She is not in acute distress. Appearance: Normal appearance. She is well-developed. She is not diaphoretic. HENT:      Head: Normocephalic and atraumatic. Right Ear: Tympanic membrane, ear canal and external ear normal.      Left Ear: Tympanic membrane, ear canal and external ear normal.      Nose: Nose normal.      Mouth/Throat:      Mouth: Mucous membranes are moist.      Pharynx: Oropharynx is clear. No oropharyngeal exudate. Eyes:      General:         Right eye: No discharge. Left eye: No discharge. Conjunctiva/sclera: Conjunctivae normal.      Pupils: Pupils are equal, round, and reactive to light. Neck:      Thyroid: No thyromegaly. Cardiovascular:      Rate and Rhythm: Normal rate and regular rhythm. Heart sounds: Normal heart sounds. Pulmonary:      Effort: Pulmonary effort is normal.      Breath sounds: Normal breath sounds. No wheezing or rales. Abdominal:      General: Bowel sounds are normal. There is no distension. Palpations: Abdomen is soft. Tenderness:  There is no abdominal tenderness. Musculoskeletal:      Cervical back: Normal range of motion and neck supple. Lymphadenopathy:      Cervical: No cervical adenopathy. Skin:     General: Skin is warm and dry. Findings: No rash. Neurological:      Mental Status: She is alert and oriented to person, place, and time. Psychiatric:         Mood and Affect: Affect is tearful. Behavior: Behavior normal.         Thought Content: Thought content normal.         Judgment: Judgment normal.           ASSESSMENT/PLAN:  Encounter Diagnoses   Name Primary?  Essential hypertension Yes    Mixed hyperlipidemia     Impaired fasting blood sugar     DERIAN (obstructive sleep apnea)     Thrombocytopenia (HCC)     Severe obesity (BMI 35.0-39. 9) with comorbidity ([facility] [address]      Orders Placed This Encounter   Medications    rosuvastatin (CRESTOR) 20 MG tablet     Sig: take 1 tablet by mouth at bedtime     Dispense:  90 tablet     Refill:  1     Orders Placed This Encounter   Procedures    CBC Auto Differential     Standing Status:   Future     Standing Expiration Date:   2/14/2023    Comprehensive Metabolic Panel     Standing Status:   Future     Standing Expiration Date:   2/14/2023    Hemoglobin A1C     Standing Status:   Future     Standing Expiration Date:   2/14/2023    Lipid Panel     Standing Status:   Future     Standing Expiration Date:   2/14/2023     Order Specific Question:   Is Patient Fasting?/# of Hours     Answer:   12 hours     Patient is to continue on her current medical therapy. No changes are made at this time. Patient is to continue to follow a low-carb/low sugar/low-fat diet and increase exercise for optimal blood sugar and cholesterol control. Patient is to return to my office in 6 months duration or sooner if any further problems or symptoms arise.     (Please note that portions of this note were completed with a voice recognition program. Efforts were made to edit the dictations but occasionally words are mis-transcribed.)      No follow-ups on file. An electronic signature was used to authenticate this note.     --Cristina Rodríguez,  on 2/14/2022 at 7:33 AM

## 2022-02-23 ENCOUNTER — TELEMEDICINE (OUTPATIENT)
Dept: FAMILY MEDICINE CLINIC | Age: 72
End: 2022-02-23
Payer: MEDICARE

## 2022-02-23 DIAGNOSIS — Z00.00 MEDICARE ANNUAL WELLNESS VISIT, SUBSEQUENT: Primary | ICD-10-CM

## 2022-02-23 PROCEDURE — G0439 PPPS, SUBSEQ VISIT: HCPCS | Performed by: FAMILY MEDICINE

## 2022-02-23 ASSESSMENT — PATIENT HEALTH QUESTIONNAIRE - PHQ9
SUM OF ALL RESPONSES TO PHQ QUESTIONS 1-9: 0
2. FEELING DOWN, DEPRESSED OR HOPELESS: 0
SUM OF ALL RESPONSES TO PHQ QUESTIONS 1-9: 0
SUM OF ALL RESPONSES TO PHQ9 QUESTIONS 1 & 2: 0
SUM OF ALL RESPONSES TO PHQ QUESTIONS 1-9: 0
1. LITTLE INTEREST OR PLEASURE IN DOING THINGS: 0
SUM OF ALL RESPONSES TO PHQ QUESTIONS 1-9: 0

## 2022-02-23 ASSESSMENT — LIFESTYLE VARIABLES: HOW OFTEN DO YOU HAVE A DRINK CONTAINING ALCOHOL: NEVER

## 2022-02-23 NOTE — PATIENT INSTRUCTIONS
Personalized Preventive Plan for Enma Older - 2/23/2022  Medicare offers a range of preventive health benefits. Some of the tests and screenings are paid in full while other may be subject to a deductible, co-insurance, and/or copay. Some of these benefits include a comprehensive review of your medical history including lifestyle, illnesses that may run in your family, and various assessments and screenings as appropriate. After reviewing your medical record and screening and assessments performed today your provider may have ordered immunizations, labs, imaging, and/or referrals for you. A list of these orders (if applicable) as well as your Preventive Care list are included within your After Visit Summary for your review. Other Preventive Recommendations:    · A preventive eye exam performed by an eye specialist is recommended every 1-2 years to screen for glaucoma; cataracts, macular degeneration, and other eye disorders. · A preventive dental visit is recommended every 6 months. · Try to get at least 150 minutes of exercise per week or 10,000 steps per day on a pedometer . · Order or download the FREE \"Exercise & Physical Activity: Your Everyday Guide\" from [employer]. Call 4-455.314.5818 or search [employer] online. · You need 1709-7360 mg of calcium and 3941-2290 IU of vitamin D per day. It is possible to meet your calcium requirement with diet alone, but a vitamin D supplement is usually necessary to meet this goal.  · When exposed to the sun, use a sunscreen that protects against both UVA and UVB radiation with an SPF of 30 or greater. Reapply every 2 to 3 hours or after sweating, drying off with a towel, or swimming. · Always wear a seat belt when traveling in a car. Always wear a helmet when riding a bicycle or motorcycle. Heart-Healthy Diet   Sodium, Fat, and Cholesterol Controlled Diet       What Is a Heart Healthy Diet?    A heart-healthy diet is one that limits sodium , certain types of fat , and cholesterol . This type of diet is recommended for:   People with any form of cardiovascular disease (eg, coronary heart disease , peripheral vascular disease , previous heart attack , previous stroke )   People with risk factors for cardiovascular disease, such as high blood pressure , high cholesterol , or diabetes   Anyone who wants to lower their risk of developing cardiovascular disease   Sodium    Sodium is a mineral found in many foods. In general, most people consume much more sodium than they need. Diets high in sodium can increase blood pressure and lead to edema (water retention). On a heart-healthy diet, you should consume no more than 2,300 mg (milligrams) of sodium per dayabout the amount in one teaspoon of table salt. The foods highest in sodium include table salt (about 50% sodium), processed foods, convenience foods, and preserved foods. Cholesterol    Cholesterol is a fat-like, waxy substance in your blood. Our bodies make some cholesterol. It is also found in animal products, with the highest amounts in fatty meat, egg yolks, whole milk, cheese, shellfish, and organ meats. On a heart-healthy diet, you should limit your cholesterol intake to less than 200 mg per day. It is normal and important to have some cholesterol in your bloodstream. But too much cholesterol can cause plaque to build up within your arteries, which can eventually lead to a heart attack or stroke. The two types of cholesterol that are most commonly referred to are:   Low-density lipoprotein (LDL) cholesterol  Also known as bad cholesterol, this is the cholesterol that tends to build up along your arteries. Bad cholesterol levels are increased by eating fats that are saturated or hydrogenated. Optimal level of this cholesterol is less than 100. Over 130 starts to get risky for heart disease.    High-density lipoprotein (HDL) cholesterol  Also known as good cholesterol, this type of cholesterol actually carries cholesterol away from your arteries and may, therefore, help lower your risk of having a heart attack. You want this level to be high (ideally greater than 60). It is a risk to have a level less than 40. You can raise this good cholesterol by eating olive oil, canola oil, avocados, or nuts. Exercise raises this level, too. Fat    Fat is calorie dense and packs a lot of calories into a small amount of food. Even though fats should be limited due to their high calorie content, not all fats are bad. In fact, some fats are quite healthful. Fat can be broken down into four main types. The good-for-you fats are:   Monounsaturated fat  found in oils such as olive and canola, avocados, and nuts and natural nut butters; can decrease cholesterol levels, while keeping levels of HDL cholesterol high   Polyunsaturated fat  found in oils such as safflower, sunflower, soybean, corn, and sesame; can decrease total cholesterol and LDL cholesterol   Omega-3 fatty acids  particularly those found in fatty fish (such as salmon, trout, tuna, mackerel, herring, and sardines); can decrease risk of arrhythmias, decrease triglyceride levels, and slightly lower blood pressure   The fats that you want to limit are:   Saturated fat  found in animal products, many fast foods, and a few vegetables; increases total blood cholesterol, including LDL levels   Animal fats that are saturated include: butter, lard, whole-milk dairy products, meat fat, and poultry skin   Vegetable fats that are saturated include: hydrogenated shortening, palm oil, coconut oil, cocoa butter   Hydrogenated or trans fat  found in margarine and vegetable shortening, most shelf stable snack foods, and fried foods; increases LDL and decreases HDL     It is generally recommended that you limit your total fat for the day to less than 30% of your total calories.  If you follow an 1800-calorie heart healthy diet, for week) Tofu Nuts or seeds (unsalted, dry-roasted), low-sodium peanut butter Dried peas, beans, and lentils   Any smoked, cured, salted, or canned meat, fish, or poultry (including crespo, chipped beef, cold cuts, hot dogs, sausages, sardines, and anchovies) Poultry skins Breaded and/or fried fish or meats Canned peas, beans, and lentils Salted nuts   Fats and Oils   Olive oil and canola oil Low-sodium, low-fat salad dressings and mayonnaise   Butter, margarine, coconut and palm oils, crespo fat   Snacks, Sweets, and Condiments   Low-sodium or unsalted versions of broths, soups, soy sauce, and condiments Pepper, herbs, and spices; vinegar, lemon, or lime juice Low-fat frozen desserts (yogurt, sherbet, fruit bars) Sugar, cocoa powder, honey, syrup, jam, and preserves Low-fat, trans-fat free cookies, cakes, and pies Arturo and animal crackers, fig bars, emily snaps   High-fat desserts Broth, soups, gravies, and sauces, made from instant mixes or other high-sodium ingredients Salted snack foods Canned olives Meat tenderizers, seasoning salt, and most flavored vinegars   Beverages   Low-sodium carbonated beverages Tea and coffee in moderation Soy milk   Commercially softened water   Suggestions   Make whole grains, fruits, and vegetables the base of your diet. Choose heart-healthy fats such as canola, olive, and flaxseed oil, and foods high in heart-healthy fats, such as nuts, seeds, soybeans, tofu, and fish. Eat fish at least twice per week; the fish highest in omega-3 fatty acids and lowest in mercury include salmon, herring, mackerel, sardines, and canned chunk light tuna. If you eat fish less than twice per week or have high triglycerides, talk to your doctor about taking fish oil supplements. Read food labels.    For products low in fat and cholesterol, look for fat free, low-fat, cholesterol free, saturated fat free, and trans fat freeAlso scan the Nutrition Facts Label, which lists saturated fat, trans fat, and cholesterol amounts. For products low in sodium, look for sodium free, very low sodium, low sodium, no added salt, and unsalted   Skip the salt when cooking or at the table; if food needs more flavor, get creative and try out different herbs and spices. Garlic and onion also add substantial flavor to foods. Trim any visible fat off meat and poultry before cooking, and drain the fat off after melissa. Use cooking methods that require little or no added fat, such as grilling, boiling, baking, poaching, broiling, roasting, steaming, stir-frying, and sauting. Avoid fast food and convenience food. They tend to be high in saturated and trans fat and have a lot of added salt. Talk to a registered dietitian for individualized diet advice. Last Reviewed: March 2011 Alvin Wilcox MS, MPH, RD   Updated: 3/29/2011   ·        Learning About Living [place]  What is a living will? A living will, also called a declaration, is a legal form. It tells your family and your doctor your wishes when you can't speak for yourself. It's used by the health professionals who will treat you as you near the end of your life or if you get seriously hurt or ill. If you put your wishes in writing, your loved ones and others will know what kind of care you want. They won't need to guess. This can ease your mind and be helpful to others. And you can change or cancel your living will at any time. A living will is not the same as an estate or property will. An estate will explains what you want to happen with your money and property after you die. How do you use it? A living will is used to describe the kinds of treatment or life support you want as you near the end of your life or if you get seriously hurt or ill. Keep these facts in mind about living reynolds. Your living will is used only if you can't speak or make decisions for yourself.  Most often, one or more doctors must certify that you can't speak or decide for yourself before your living will takes effect. If you get better and can speak for yourself again, you can accept or refuse any treatment. It doesn't matter what you said in your living will. Some states may limit your right to refuse treatment in certain cases. For example, you may need to clearly state in your living will that you don't want artificial hydration and nutrition, such as being fed through a tube. Is a living will a legal document? A living will is a legal document. Each state has its own laws about living reynolds. And a living will may be called something else in your state. Here are some things to know about living reynolds. You don't need an  to complete a living will. But legal advice can be helpful if your state's laws are unclear. It can also help if your health history is complicated or your family can't agree on what should be in your living will. You can change your living will at any time. Some people find that their wishes about end-of-life care change as their health changes. If you make big changes to your living will, complete a new form. If you move to another state, make sure that your living will is legal in the state where you now live. In most cases, doctors will respect your wishes even if you have a form from a different state. You might use a universal form that has been approved by many states. This kind of form can sometimes be filled out and stored online. Your digital copy will then be available wherever you have a connection to the internet. The doctors and nurses who need to treat you can find it right away. Your state may offer an online registry. This is another place where you can store your living will online. It's a good idea to get your living will notarized. This means using a person called a  to watch two people sign, or witness, your living will. What should you know when you create a living will?   Here are some questions to ask yourself as you make your living will:  Do you know enough about life support methods that might be used? If not, talk to your doctor so you know what might be done if you can't breathe on your own, your heart stops, or you can't swallow. What things would you still want to be able to do after you receive life-support methods? Would you want to be able to walk? To speak? To eat on your own? To live without the help of machines? Do you want certain Buddhism practices performed if you become very ill? If you have a choice, where do you want to be cared for? In your home? At a hospital or nursing home? If you have a choice at the end of your life, where would you prefer to die? At home? In a hospital or nursing home? Somewhere else? Would you prefer to be buried or cremated? Do you want your organs to be donated after you die? What should you do with your living will? Make sure that your family members and your health care agent have copies of your living will (also called a declaration). Give your doctor a copy of your living will. Ask him or her to keep it as part of your medical record. If you have more than one doctor, make sure that each one has a copy. Put a copy of your living will where it can be easily found. For example, some people may put a copy on their refrigerator door. If you are using a digital copy, be sure your doctor, family members, and health care agent know how to find and access it. Where can you learn more? Go to [email or web address] org and sign in to your [employer] account. Enter T051 in the [facility] box to learn more about \"Learning About Living [place]. \"     If you do not have an account, please click on the \"Sign Up Now\" link. Current as of: March 17, 2021               Content Version: 13.1  © 0368-7159 [employer], [employer]. Care instructions adapted under license by [facility] ([facility]).  If you have questions about a medical condition or this instruction, always ask your healthcare professional. [place] [address] any warranty or liability for your use of this information.     ·

## 2022-02-23 NOTE — PROGRESS NOTES
Medicare Annual Wellness Visit    Negro Tijerina is here for Medicare AWV (subsequent; last 2/10/2021)    Assessment & Plan   There are no diagnoses linked to this encounter. Recommendations for Preventive Services Due: see orders and patient instructions/AVS.  Recommended screening schedule for the next 5-10 years is provided to the patient in written form: see Patient Instructions/AVS.     No follow-ups on file. Reviewed and updated this visit:  Tobacco  Allergies  Meds  Med Hx  Surg Hx  Soc Hx  Fam Hx      Subjective       Patient's complete Health Risk Assessment and screening values have been reviewed and are found in Flowsheets. The following problems were reviewed today and where indicated follow up appointments were made and/or referrals ordered.     Positive Risk Factor Screenings with Interventions:             General Health and ACP:  General  In general, how would you say your health is?: Good  In the past 7 days, have you experienced any of the following: New or Increased Pain, New or Increased Fatigue, Loneliness, Social Isolation, Stress or Anger?: No  Do you get the social and emotional support that you need?: Yes  Do you have a Living Will?: (!) No (discuss purpose and avenues to complete a LW)    Advance Directives     Power of  Living Will ACP-Advance Directive ACP-Power of     Not on File Not on File Not on File Not on File      General Health Risk Interventions:  · No Living Will: Advance Care Planning addressed with patient today    Health Habits/Nutrition:     Physical Activity: Sufficiently Active    Days of Exercise per Week: 5 days    Minutes of Exercise per Session: 30 min     Have you lost any weight without trying in the past 3 months?: No         Have you seen the dentist within the past year?: Yes      Health Habits/Nutrition Interventions:  · Nutritional issues:  educational materials for healthy, well-balanced diet provided          Objective Patient-Reported Vitals  Patient-Reported Weight: 214lb  Patient-Reported Height: 5'3\"             Allergies   Allergen Reactions    Simvastatin Other (See Comments)     Muscle cramps in legs     Prior to Visit Medications    Medication Sig Taking? Authorizing Provider   rosuvastatin (CRESTOR) 20 MG tablet take 1 tablet by mouth at bedtime Yes Raegan Chowdhury DO   diclofenac-miSOPROStol (ARTHROTEC 75) 75-0.2 MG per tablet TAKE 1 TABLET DAILY Yes Raegan Chowdhury DO   amLODIPine (NORVASC) 5 MG tablet TAKE 1 TABLET DAILY Yes Raegan Chowdhury DO   metoprolol tartrate (LOPRESSOR) 50 MG tablet TAKE 1 TABLET DAILY Yes Raegan Chowdhury DO   lisinopril (PRINIVIL;ZESTRIL) 20 MG tablet TAKE 1 TABLET DAILY Yes Raegan Chowdhury DO   Respiratory Therapy Supplies MISC Bipap supplies as needed Yes Raegan Chowdhury DO   Omega-3 Fatty Acids (FISH OIL PO) Take by mouth daily Yes Historical Provider, MD   Multiple Vitamins-Minerals (PRESERVISION AREDS 2 PO) Take 2 tablets by mouth daily Yes Historical Provider, MD   SUMAtriptan (IMITREX) 50 MG tablet Take 1 tablet by mouth once as needed for Migraine May repeat an additional dose if needed after 2 hours  Raegan Chowdhury DO   Discussed health maintenance. Patient declines dexa screening at this time. CareTeam (Including outside providers/suppliers regularly involved in providing care):   Patient Care Team:  Raegan Chowdhury DO as PCP - General (Family Medicine)  Raegan Chowdhury DO as PCP - REHABILITATION [facility] Empaneled Provider     AWV completed via telephone. Kathleen Shankar, was evaluated through a synchronous (real-time) audio-video encounter. The patient (or guardian if applicable) is aware that this is a billable service, which includes applicable co-pays. This Virtual Visit was conducted with patient's (and/or legal guardian's) consent.  The visit was conducted pursuant to the emergency declaration under the [address] [facility] [place], [address] waiver authority and the [employer] General Act. Patient identification was verified, and a caregiver was present when appropriate. The patient was located at home in a state where the provider was licensed to provide care. Rosetta Sandhu RN, 2/23/2022, performed the documented evaluation under the direct supervision of the attending physician.

## 2022-03-03 DIAGNOSIS — I10 ESSENTIAL HYPERTENSION: ICD-10-CM

## 2022-03-03 RX ORDER — METOPROLOL TARTRATE 50 MG/1
TABLET, FILM COATED ORAL
Qty: 90 TABLET | Refills: 1 | Status: SHIPPED | OUTPATIENT
Start: 2022-03-03 | End: 2022-09-02 | Stop reason: SDUPTHER

## 2022-03-03 NOTE — TELEPHONE ENCOUNTER
Za Handy called requesting a refill of the below medication which has been pended for you:     Requested Prescriptions     Pending Prescriptions Disp Refills    metoprolol tartrate (LOPRESSOR) 50 MG tablet [Pharmacy Med Name: METOPROLOL TARTRATE TABS 50MG] 90 tablet 3     Sig: TAKE 1 TABLET DAILY       Last Appointment Date: 2/23/2022  Next Appointment Date: 8/19/2022    Allergies   Allergen Reactions    Simvastatin Other (See Comments)     Muscle cramps in legs

## 2022-03-30 RX ORDER — AMLODIPINE BESYLATE 5 MG/1
TABLET ORAL
Qty: 90 TABLET | Refills: 1 | Status: SHIPPED | OUTPATIENT
Start: 2022-03-30 | End: 2022-09-26

## 2022-03-30 NOTE — TELEPHONE ENCOUNTER
Ashley Smith called requesting a refill of the below medication which has been pended for you:     Requested Prescriptions     Pending Prescriptions Disp Refills    amLODIPine (NORVASC) 5 MG tablet [Pharmacy Med Name: AMLODIPINE BESYLATE TABS 5MG] 90 tablet 1     Sig: TAKE 1 TABLET DAILY       Last Appointment Date: 2/23/2022  Next Appointment Date: 8/19/2022    Allergies   Allergen Reactions    Simvastatin Other (See Comments)     Muscle cramps in legs

## 2022-04-15 ENCOUNTER — OFFICE VISIT (OUTPATIENT)
Dept: PRIMARY CARE CLINIC | Age: 72
End: 2022-04-15
Payer: MEDICARE

## 2022-04-15 VITALS
SYSTOLIC BLOOD PRESSURE: 120 MMHG | HEART RATE: 74 BPM | TEMPERATURE: 97.3 F | DIASTOLIC BLOOD PRESSURE: 72 MMHG | OXYGEN SATURATION: 97 %

## 2022-04-15 DIAGNOSIS — M25.551 BILATERAL HIP PAIN: ICD-10-CM

## 2022-04-15 DIAGNOSIS — M70.62 TROCHANTERIC BURSITIS OF BOTH HIPS: Primary | ICD-10-CM

## 2022-04-15 DIAGNOSIS — M25.552 BILATERAL HIP PAIN: ICD-10-CM

## 2022-04-15 DIAGNOSIS — M70.61 TROCHANTERIC BURSITIS OF BOTH HIPS: Primary | ICD-10-CM

## 2022-04-15 PROCEDURE — 99212 OFFICE O/P EST SF 10 MIN: CPT | Performed by: FAMILY MEDICINE

## 2022-04-15 PROCEDURE — 20610 DRAIN/INJ JOINT/BURSA W/O US: CPT | Performed by: FAMILY MEDICINE

## 2022-04-15 PROCEDURE — PBSHW PBB SHADOW CHARGE: Performed by: FAMILY MEDICINE

## 2022-04-15 PROCEDURE — 99213 OFFICE O/P EST LOW 20 MIN: CPT | Performed by: FAMILY MEDICINE

## 2022-04-15 RX ORDER — TRIAMCINOLONE ACETONIDE 40 MG/ML
40 INJECTION, SUSPENSION INTRA-ARTICULAR; INTRAMUSCULAR ONCE
Status: COMPLETED | OUTPATIENT
Start: 2022-04-15 | End: 2022-04-15

## 2022-04-15 RX ADMIN — TRIAMCINOLONE ACETONIDE 40 MG: 40 INJECTION, SUSPENSION INTRA-ARTICULAR; INTRAMUSCULAR at 19:31

## 2022-04-15 RX ADMIN — TRIAMCINOLONE ACETONIDE 40 MG: 40 INJECTION, SUSPENSION INTRA-ARTICULAR; INTRAMUSCULAR at 19:32

## 2022-04-15 ASSESSMENT — PATIENT HEALTH QUESTIONNAIRE - PHQ9
2. FEELING DOWN, DEPRESSED OR HOPELESS: 0
SUM OF ALL RESPONSES TO PHQ QUESTIONS 1-9: 0
SUM OF ALL RESPONSES TO PHQ9 QUESTIONS 1 & 2: 0
SUM OF ALL RESPONSES TO PHQ QUESTIONS 1-9: 0
1. LITTLE INTEREST OR PLEASURE IN DOING THINGS: 0

## 2022-04-15 NOTE — PROGRESS NOTES
4/15/2022     Aldo Felipe (:  1950) is a 80 y.o. female, here for evaluation of the following medical concerns:    Hip Pain   The incident occurred more than 1 week ago (has been having increased flare of bilateral hip pain over the last couple of weeks). There was no injury mechanism. The pain is present in the left hip and right hip. The quality of the pain is described as aching. The pain is moderate. The pain has been constant since onset. Associated symptoms include an inability to bear weight. Pertinent negatives include no numbness or tingling. The symptoms are aggravated by movement, weight bearing and palpation. She has tried NSAIDs and rest for the symptoms. The treatment provided no relief. Did review patient's med list, allergies, social history,pmhx and pshx today as noted in the record. Review of Systems   Constitutional: Negative for chills, fatigue and fever. Musculoskeletal: Positive for arthralgias, gait problem and myalgias. Neurological: Negative for tingling and numbness. Prior to Visit Medications    Medication Sig Taking?  Authorizing Provider   amLODIPine (NORVASC) 5 MG tablet TAKE 1 TABLET DAILY Yes Erika Parra DO   metoprolol tartrate (LOPRESSOR) 50 MG tablet TAKE 1 TABLET DAILY Yes Erika Parra DO   rosuvastatin (CRESTOR) 20 MG tablet take 1 tablet by mouth at bedtime Yes Erika Parra DO   diclofenac-miSOPROStol (ARTHROTEC 75) 75-0.2 MG per tablet TAKE 1 TABLET DAILY Yes Desirae Calhoun,    lisinopril (PRINIVIL;ZESTRIL) 20 MG tablet TAKE 1 TABLET DAILY Yes Erika Parra DO   Respiratory Therapy Supplies MISC Bipap supplies as needed Yes Erika Parra DO   Omega-3 Fatty Acids (FISH OIL PO) Take by mouth daily Yes Historical Provider, MD   Multiple Vitamins-Minerals (PRESERVISION AREDS 2 PO) Take 2 tablets by mouth daily Yes Historical Provider, MD   SUMAtriptan (IMITREX) 50 MG tablet Take 1 tablet by mouth once as needed for Migraine May repeat an additional dose if needed after 2 hours  Radha Fischer DO        Social History     Tobacco Use    Smoking status: Never Smoker    Smokeless tobacco: Never Used   Substance Use Topics    Alcohol use: Yes     Comment: Rare alcohol use. Vitals:    04/15/22 1110   BP: 120/72   Site: Left Upper Arm   Position: Sitting   Cuff Size: Large Adult   Pulse: 74   Temp: 97.3 °F (36.3 °C)   TempSrc: Tympanic   SpO2: 97%     Estimated body mass index is 37.91 kg/m² as calculated from the following:    Height as of 2/14/22: 5' 3\" (1.6 m). Weight as of 2/14/22: 214 lb (97.1 kg). Physical Exam  Vitals and nursing note reviewed. Constitutional:       General: She is not in acute distress. Appearance: She is well-developed. She is not diaphoretic. HENT:      Head: Normocephalic and atraumatic. Eyes:      Conjunctiva/sclera: Conjunctivae normal.   Pulmonary:      Effort: Pulmonary effort is normal.   Musculoskeletal:         General: Tenderness present. No swelling. Normal range of motion. Cervical back: Normal range of motion. Comments: Reproducible pain with palpation to the bilateral trochanteric bursa. Skin:     General: Skin is warm and dry. Coloration: Skin is not pale. Findings: No erythema or rash. Neurological:      Mental Status: She is alert and oriented to person, place, and time. Psychiatric:         Behavior: Behavior normal.         Thought Content: Thought content normal.         Judgment: Judgment normal.         ASSESSMENT/PLAN:  Encounter Diagnoses   Name Primary?     Trochanteric bursitis of both hips Yes    Bilateral hip pain      Orders Placed This Encounter   Medications    triamcinolone acetonide (KENALOG-40) injection 40 mg    triamcinolone acetonide (KENALOG-40) injection 40 mg     Orders Placed This Encounter   Procedures    NM ARTHROCENTESIS ASPIR&/INJ MAJOR JT/BURSA W/O US    NM ARTHROCENTESIS ASPIR&/INJ MAJOR JT/BURSA W/O US     Procedure Note    PREOP DIAGNOSIS:  [] Right []  Left    [x] Bilateral Hip Pain    POSTOP DIAGNOSIS:  [] Right []  Left    [x] Bilateral Hip Pain    OPERATION:  [] Right []  Left    [x] Bilateral Greater trochanter injection      PROCEDURE:  After sterile prep, 40 mg of Kenalog and 2 mL of 1% Xylocaine without epinephrine was injected into the bursa without incident. Pre- and post neurovascular status verified. No complications noted. Tylenol/Motrin prn    Return  if no improvement in symptoms or if any further symptoms arise. No follow-ups on file. An electronic signature was used to authenticate this note.     --Mario Daley,  on 4/15/2022 at 7:27 PM

## 2022-05-02 RX ORDER — LISINOPRIL 20 MG/1
TABLET ORAL
Qty: 90 TABLET | Refills: 3 | Status: SHIPPED | OUTPATIENT
Start: 2022-05-02

## 2022-05-02 NOTE — TELEPHONE ENCOUNTER
Misty Keen called requesting a refill of the below medication which has been pended for you:     Requested Prescriptions     Pending Prescriptions Disp Refills    lisinopril (PRINIVIL;ZESTRIL) 20 MG tablet [Pharmacy Med Name: LISINOPRIL TABS 20MG] 90 tablet 3     Sig: TAKE 1 TABLET DAILY       Last Appointment Date: 4/15/2022  Next Appointment Date: 8/19/2022    Allergies   Allergen Reactions    Simvastatin Other (See Comments)     Muscle cramps in legs

## 2022-05-09 RX ORDER — DICLOFENAC SODIUM AND MISOPROSTOL 75; 200 MG/1; UG/1
TABLET, DELAYED RELEASE ORAL
Qty: 90 TABLET | Refills: 1 | Status: SHIPPED | OUTPATIENT
Start: 2022-05-09 | End: 2022-11-03

## 2022-05-09 NOTE — TELEPHONE ENCOUNTER
Barry Cote called requesting a refill of the below medication which has been pended for you:     Requested Prescriptions     Pending Prescriptions Disp Refills    diclofenac-miSOPROStol (ARTHROTEC 75) 75-0.2 MG per tablet [Pharmacy Med Name: DICLOFENAC/MISOPROSTOL TABS 75/.2MG] 90 tablet 1     Sig: TAKE 1 TABLET DAILY       Last Appointment Date: 4/15/2022  Next Appointment Date: 8/19/2022    Allergies   Allergen Reactions    Simvastatin Other (See Comments)     Muscle cramps in legs

## 2022-07-29 RX ORDER — ROSUVASTATIN CALCIUM 20 MG/1
TABLET, COATED ORAL
Qty: 90 TABLET | Refills: 3 | Status: SHIPPED | OUTPATIENT
Start: 2022-07-29

## 2022-07-29 NOTE — TELEPHONE ENCOUNTER
Leidy Yeh called requesting a refill of the below medication which has been pended for you:     Requested Prescriptions     Pending Prescriptions Disp Refills    rosuvastatin (CRESTOR) 20 MG tablet [Pharmacy Med Name: ROSUVASTATIN TABS 20MG] 90 tablet 3     Sig: TAKE 1 TABLET AT BEDTIME       Last Appointment Date: 4/15/2022  Next Appointment Date: 9/2/2022    Allergies   Allergen Reactions    Simvastatin Other (See Comments)     Muscle cramps in legs

## 2022-08-09 ENCOUNTER — OFFICE VISIT (OUTPATIENT)
Dept: PRIMARY CARE CLINIC | Age: 72
End: 2022-08-09
Payer: MEDICARE

## 2022-08-09 VITALS
SYSTOLIC BLOOD PRESSURE: 128 MMHG | WEIGHT: 218 LBS | BODY MASS INDEX: 38.62 KG/M2 | TEMPERATURE: 98.3 F | HEIGHT: 63 IN | RESPIRATION RATE: 18 BRPM | HEART RATE: 79 BPM | OXYGEN SATURATION: 96 % | DIASTOLIC BLOOD PRESSURE: 86 MMHG

## 2022-08-09 DIAGNOSIS — M70.71 BURSITIS OF OTHER BURSA OF BOTH HIPS: Primary | ICD-10-CM

## 2022-08-09 DIAGNOSIS — M25.552 BILATERAL HIP PAIN: ICD-10-CM

## 2022-08-09 DIAGNOSIS — M70.72 BURSITIS OF OTHER BURSA OF BOTH HIPS: Primary | ICD-10-CM

## 2022-08-09 DIAGNOSIS — M25.551 BILATERAL HIP PAIN: ICD-10-CM

## 2022-08-09 PROCEDURE — 99213 OFFICE O/P EST LOW 20 MIN: CPT | Performed by: FAMILY MEDICINE

## 2022-08-09 PROCEDURE — PBSHW PBB SHADOW CHARGE: Performed by: FAMILY MEDICINE

## 2022-08-09 PROCEDURE — 20610 DRAIN/INJ JOINT/BURSA W/O US: CPT | Performed by: FAMILY MEDICINE

## 2022-08-09 PROCEDURE — 1123F ACP DISCUSS/DSCN MKR DOCD: CPT | Performed by: FAMILY MEDICINE

## 2022-08-09 RX ORDER — TRIAMCINOLONE ACETONIDE 40 MG/ML
40 INJECTION, SUSPENSION INTRA-ARTICULAR; INTRAMUSCULAR ONCE
Status: COMPLETED | OUTPATIENT
Start: 2022-08-09 | End: 2022-08-09

## 2022-08-09 RX ADMIN — TRIAMCINOLONE ACETONIDE 40 MG: 40 INJECTION, SUSPENSION INTRA-ARTICULAR; INTRAMUSCULAR at 17:05

## 2022-08-09 NOTE — PROGRESS NOTES
2022     Cole Delcid (:  1950) is a 71 y.o. female, here for evaluation of the following medical concerns:    Hip Pain   The incident occurred more than 1 week ago (has had increasing pain to bilateral hips. Has had injections in the past which have helped, but now pain is worsening again). The pain is present in the left hip and right hip. The quality of the pain is described as aching. The pain has been Constant since onset. Pertinent negatives include no muscle weakness or tingling. The symptoms are aggravated by movement and weight bearing. She has tried NSAIDs for the symptoms. The treatment provided mild relief. Did review patient's med list, allergies, social history,pmhx and pshx today as noted in the record. Review of Systems   Constitutional:  Negative for chills, fatigue and fever. Musculoskeletal:  Positive for arthralgias, gait problem and myalgias. Negative for neck pain and neck stiffness. Neurological:  Negative for tingling and weakness. Prior to Visit Medications    Medication Sig Taking?  Authorizing Provider   rosuvastatin (CRESTOR) 20 MG tablet TAKE 1 TABLET AT BEDTIME Yes Rutelena Whelan, DO   diclofenac-miSOPROStol (ARTHROTEC 75) 75-0.2 MG per tablet TAKE 1 TABLET DAILY Yes Ruthine Tip, DO   lisinopril (PRINIVIL;ZESTRIL) 20 MG tablet TAKE 1 TABLET DAILY Yes Chucky Calhoun,    amLODIPine (NORVASC) 5 MG tablet TAKE 1 TABLET DAILY Yes Rutelena Whelan DO   metoprolol tartrate (LOPRESSOR) 50 MG tablet TAKE 1 TABLET DAILY Yes Manuel Whelan DO   Respiratory Therapy Supplies MISC Bipap supplies as needed Yes Rutelena Whelan, DO   Omega-3 Fatty Acids (FISH OIL PO) Take by mouth daily Yes Historical Provider, MD   Multiple Vitamins-Minerals (PRESERVISION AREDS 2 PO) Take 2 tablets by mouth daily Yes Historical Provider, MD   SUMAtriptan (IMITREX) 50 MG tablet Take 1 tablet by mouth once as needed for Migraine May repeat an additional dose if needed after 2 hours  Gray Bosworth, DO        Social History     Tobacco Use    Smoking status: Never    Smokeless tobacco: Never   Substance Use Topics    Alcohol use: Yes     Comment: Rare alcohol use. Vitals:    08/09/22 1612   BP: 128/86   Site: Left Upper Arm   Position: Sitting   Cuff Size: Large Adult   Pulse: 79   Resp: 18   Temp: 98.3 °F (36.8 °C)   TempSrc: Tympanic   SpO2: 96%   Weight: 218 lb (98.9 kg)   Height: 5' 3\" (1.6 m)     Estimated body mass index is 38.62 kg/m² as calculated from the following:    Height as of this encounter: 5' 3\" (1.6 m). Weight as of this encounter: 218 lb (98.9 kg). Physical Exam  Vitals and nursing note reviewed. Constitutional:       General: She is not in acute distress. Appearance: She is well-developed. She is not diaphoretic. HENT:      Head: Normocephalic and atraumatic. Eyes:      Conjunctiva/sclera: Conjunctivae normal.   Pulmonary:      Effort: Pulmonary effort is normal.   Musculoskeletal:         General: Tenderness present. Cervical back: Normal range of motion. Comments: Reproducible pain with palpation to the bilateral hips over the greater trochanteric bursa. Moves the lower extremities in a normal range of motion. Skin:     General: Skin is warm and dry. Coloration: Skin is not pale. Findings: No erythema or rash. Neurological:      Mental Status: She is alert and oriented to person, place, and time. Psychiatric:         Behavior: Behavior normal.         Thought Content: Thought content normal.         Judgment: Judgment normal.       ASSESSMENT/PLAN:  Encounter Diagnoses   Name Primary?     Bursitis of other bursa of both hips Yes    Bilateral hip pain      Orders Placed This Encounter   Medications    triamcinolone acetonide (KENALOG-40) injection 40 mg    triamcinolone acetonide (KENALOG-40) injection 40 mg     Orders Placed This Encounter   Procedures    MT ARTHROCENTESIS ASPIR&/INJ MAJOR JT/BURSA W/O US    IA ARTHROCENTESIS ASPIR&/INJ MAJOR JT/BURSA W/O US     Procedure Note    PREOP DIAGNOSIS:  [] Right []  Left    [x] Bilateral Hip Pain    POSTOP DIAGNOSIS:  [] Right []  Left    [x] Bilateral Hip Pain    OPERATION:  [] Right []  Left    [x] Bilateral Greater trochanter injection      PROCEDURE:  After sterile prep, 40 mg of Kenalog and 2 mL of 1% Xylocaine without epinephrine was injected into the bursa without incident. Pre- and post neurovascular status verified. No complications noted. Informed consent and time out was completed prior to procedure    Avoid side lying. Tylenol/Motrin prn    Return  if no improvement in symptoms or if any further symptoms arise. No follow-ups on file. An electronic signature was used to authenticate this note.     --Alexia Garcia DO on 8/9/2022 at 4:45 PM

## 2022-08-30 DIAGNOSIS — I10 ESSENTIAL HYPERTENSION: ICD-10-CM

## 2022-08-30 RX ORDER — METOPROLOL TARTRATE 50 MG/1
TABLET, FILM COATED ORAL
Qty: 90 TABLET | Refills: 3 | OUTPATIENT
Start: 2022-08-30

## 2022-08-30 NOTE — TELEPHONE ENCOUNTER
Raghavendra Tabares called requesting a refill of the below medication which has been pended for you:     Spoke with patient, we can wait to refill at 6 mo f/u on Friday.       Requested Prescriptions     Pending Prescriptions Disp Refills    metoprolol tartrate (LOPRESSOR) 50 MG tablet [Pharmacy Med Name: METOPROLOL TARTRATE TABS 50MG] 90 tablet 3     Sig: TAKE 1 TABLET DAILY       Last Appointment Date: 8/9/2022  Next Appointment Date: 9/2/2022    Allergies   Allergen Reactions    Simvastatin Other (See Comments)     Muscle cramps in legs

## 2022-09-01 ENCOUNTER — HOSPITAL ENCOUNTER (OUTPATIENT)
Dept: LAB | Age: 72
Discharge: HOME | End: 2022-09-01
Payer: MEDICARE

## 2022-09-01 DIAGNOSIS — R73.01 IMPAIRED FASTING BLOOD SUGAR: ICD-10-CM

## 2022-09-01 DIAGNOSIS — I10 ESSENTIAL HYPERTENSION: ICD-10-CM

## 2022-09-01 DIAGNOSIS — E78.2 MIXED HYPERLIPIDEMIA: ICD-10-CM

## 2022-09-01 LAB
ABSOLUTE EOS #: 0.09 K/UL (ref 0–0.44)
ABSOLUTE IMMATURE GRANULOCYTE: <0.03 K/UL (ref 0–0.3)
ABSOLUTE LYMPH #: 1 K/UL (ref 1.1–3.7)
ABSOLUTE MONO #: 0.46 K/UL (ref 0.1–1.2)
ALBUMIN SERPL-MCNC: 4 G/DL (ref 3.5–5.2)
ALBUMIN/GLOBULIN RATIO: 1.5 (ref 1–2.5)
ALP BLD-CCNC: 73 U/L (ref 35–104)
ALT SERPL-CCNC: 32 U/L (ref 5–33)
ANION GAP SERPL CALCULATED.3IONS-SCNC: 9 MMOL/L (ref 9–17)
AST SERPL-CCNC: 20 U/L
BASOPHILS # BLD: 1 % (ref 0–2)
BASOPHILS ABSOLUTE: 0.04 K/UL (ref 0–0.2)
BILIRUB SERPL-MCNC: 0.6 MG/DL (ref 0.3–1.2)
BUN BLDV-MCNC: 16 MG/DL (ref 8–23)
BUN/CREAT BLD: 26 (ref 9–20)
CALCIUM SERPL-MCNC: 9.4 MG/DL (ref 8.6–10.4)
CHLORIDE BLD-SCNC: 106 MMOL/L (ref 98–107)
CO2: 27 MMOL/L (ref 20–31)
CREAT SERPL-MCNC: 0.62 MG/DL (ref 0.5–0.9)
EOSINOPHILS RELATIVE PERCENT: 2 % (ref 1–4)
GFR AFRICAN AMERICAN: >60 ML/MIN
GFR NON-AFRICAN AMERICAN: >60 ML/MIN
GFR SERPL CREATININE-BSD FRML MDRD: ABNORMAL ML/MIN/{1.73_M2}
GLUCOSE BLD-MCNC: 86 MG/DL (ref 70–99)
HCT VFR BLD CALC: 47.6 % (ref 36.3–47.1)
HEMOGLOBIN: 15.6 G/DL (ref 11.9–15.1)
IMMATURE GRANULOCYTES: 0 %
LYMPHOCYTES # BLD: 19 % (ref 24–43)
MCH RBC QN AUTO: 29.9 PG (ref 25.2–33.5)
MCHC RBC AUTO-ENTMCNC: 32.8 G/DL (ref 25.2–33.5)
MCV RBC AUTO: 91.2 FL (ref 82.6–102.9)
MONOCYTES # BLD: 9 % (ref 3–12)
NRBC AUTOMATED: 0 PER 100 WBC
PDW BLD-RTO: 12.9 % (ref 11.8–14.4)
PLATELET # BLD: ABNORMAL K/UL (ref 138–453)
PLATELET, FLUORESCENCE: 138 K/UL (ref 138–453)
PLATELET, IMMATURE FRACTION: 7.2 % (ref 1.1–10.3)
POTASSIUM SERPL-SCNC: 4 MMOL/L (ref 3.7–5.3)
RBC # BLD: 5.22 M/UL (ref 3.95–5.11)
SEG NEUTROPHILS: 70 % (ref 36–65)
SEGMENTED NEUTROPHILS ABSOLUTE COUNT: 3.67 K/UL (ref 1.5–8.1)
SODIUM BLD-SCNC: 142 MMOL/L (ref 135–144)
TOTAL PROTEIN: 6.6 G/DL (ref 6.4–8.3)
WBC # BLD: 5.3 K/UL (ref 3.5–11.3)

## 2022-09-01 PROCEDURE — 83036 HEMOGLOBIN GLYCOSYLATED A1C: CPT

## 2022-09-01 PROCEDURE — 80061 LIPID PANEL: CPT

## 2022-09-01 PROCEDURE — 80053 COMPREHEN METABOLIC PANEL: CPT

## 2022-09-01 PROCEDURE — 85055 RETICULATED PLATELET ASSAY: CPT

## 2022-09-01 PROCEDURE — 36415 COLL VENOUS BLD VENIPUNCTURE: CPT

## 2022-09-01 PROCEDURE — 85025 COMPLETE CBC W/AUTO DIFF WBC: CPT

## 2022-09-02 ENCOUNTER — OFFICE VISIT (OUTPATIENT)
Dept: FAMILY MEDICINE CLINIC | Age: 72
End: 2022-09-02
Payer: MEDICARE

## 2022-09-02 VITALS
SYSTOLIC BLOOD PRESSURE: 136 MMHG | HEART RATE: 56 BPM | TEMPERATURE: 98 F | DIASTOLIC BLOOD PRESSURE: 80 MMHG | RESPIRATION RATE: 16 BRPM | BODY MASS INDEX: 38.62 KG/M2 | OXYGEN SATURATION: 96 % | HEIGHT: 63 IN | WEIGHT: 218 LBS

## 2022-09-02 DIAGNOSIS — Z12.31 ENCOUNTER FOR SCREENING MAMMOGRAM FOR MALIGNANT NEOPLASM OF BREAST: ICD-10-CM

## 2022-09-02 DIAGNOSIS — E78.2 MIXED HYPERLIPIDEMIA: ICD-10-CM

## 2022-09-02 DIAGNOSIS — I10 ESSENTIAL HYPERTENSION: Primary | ICD-10-CM

## 2022-09-02 DIAGNOSIS — G47.33 OSA (OBSTRUCTIVE SLEEP APNEA): ICD-10-CM

## 2022-09-02 DIAGNOSIS — R73.01 IMPAIRED FASTING BLOOD SUGAR: ICD-10-CM

## 2022-09-02 LAB
CHOLESTEROL/HDL RATIO: 5
CHOLESTEROL: 204 MG/DL
ESTIMATED AVERAGE GLUCOSE: 114 MG/DL
HBA1C MFR BLD: 5.6 % (ref 4–6)
HDLC SERPL-MCNC: 41 MG/DL
LDL CHOLESTEROL: 117 MG/DL (ref 0–130)
TRIGL SERPL-MCNC: 231 MG/DL

## 2022-09-02 PROCEDURE — 1123F ACP DISCUSS/DSCN MKR DOCD: CPT | Performed by: FAMILY MEDICINE

## 2022-09-02 PROCEDURE — 99212 OFFICE O/P EST SF 10 MIN: CPT | Performed by: FAMILY MEDICINE

## 2022-09-02 PROCEDURE — 99214 OFFICE O/P EST MOD 30 MIN: CPT | Performed by: FAMILY MEDICINE

## 2022-09-02 RX ORDER — METOPROLOL TARTRATE 50 MG/1
TABLET, FILM COATED ORAL
Qty: 90 TABLET | Refills: 3 | Status: SHIPPED | OUTPATIENT
Start: 2022-09-02

## 2022-09-02 SDOH — ECONOMIC STABILITY: FOOD INSECURITY: WITHIN THE PAST 12 MONTHS, YOU WORRIED THAT YOUR FOOD WOULD RUN OUT BEFORE YOU GOT MONEY TO BUY MORE.: NEVER TRUE

## 2022-09-02 SDOH — ECONOMIC STABILITY: FOOD INSECURITY: WITHIN THE PAST 12 MONTHS, THE FOOD YOU BOUGHT JUST DIDN'T LAST AND YOU DIDN'T HAVE MONEY TO GET MORE.: NEVER TRUE

## 2022-09-02 ASSESSMENT — ENCOUNTER SYMPTOMS
SHORTNESS OF BREATH: 0
DIARRHEA: 0
VOMITING: 0
EYE REDNESS: 0
ABDOMINAL PAIN: 0
CONSTIPATION: 0
TROUBLE SWALLOWING: 0
RHINORRHEA: 0
EYE DISCHARGE: 0
COUGH: 0
WHEEZING: 0
NAUSEA: 0
SORE THROAT: 0
SINUS PRESSURE: 0

## 2022-09-02 ASSESSMENT — SOCIAL DETERMINANTS OF HEALTH (SDOH): HOW HARD IS IT FOR YOU TO PAY FOR THE VERY BASICS LIKE FOOD, HOUSING, MEDICAL CARE, AND HEATING?: NOT HARD AT ALL

## 2022-09-02 NOTE — PATIENT INSTRUCTIONS
Hospital Outpatient Visit on 09/01/2022   Component Date Value Ref Range Status    Cholesterol 09/01/2022 204 (A) <200 mg/dL Final    Comment:    Cholesterol Guidelines:      <200  Desirable   200-240  Borderline      >240  Undesirable         HDL 09/01/2022 41  >40 mg/dL Final    Comment:    HDL Guidelines:    <40     Undesirable   40-59    Borderline    >59     Desirable         LDL Cholesterol 09/01/2022 117  0 - 130 mg/dL Final    Comment:    LDL Guidelines:     <100    Desirable   100-129   Near to/above Desirable   130-159   Borderline      >159   Undesirable     Direct (measured) LDL and calculated LDL are not interchangeable tests. Chol/HDL Ratio 09/01/2022 5.0 (A) <5 Final            Triglycerides 09/01/2022 231 (A) <150 mg/dL Final    Comment:    Triglyceride Guidelines:     <150   Desirable   150-199  Borderline   200-499  High     >499   Very high   Based on AHA Guidelines for fasting triglyceride, October 2012. Hemoglobin A1C 09/01/2022 5.6  4.0 - 6.0 % Final    Estimated Avg Glucose 09/01/2022 114  mg/dL Final    Comment: The ADA and AACC recommend providing the estimated average glucose result to permit better   patient understanding of their HBA1c result.       Glucose 09/01/2022 86  70 - 99 mg/dL Final    BUN 09/01/2022 16  8 - 23 mg/dL Final    Creatinine 09/01/2022 0.62  0.50 - 0.90 mg/dL Final    Bun/Cre Ratio 09/01/2022 26 (A) 9 - 20 Final    Calcium 09/01/2022 9.4  8.6 - 10.4 mg/dL Final    Sodium 09/01/2022 142  135 - 144 mmol/L Final    Potassium 09/01/2022 4.0  3.7 - 5.3 mmol/L Final    Chloride 09/01/2022 106  98 - 107 mmol/L Final    CO2 09/01/2022 27  20 - 31 mmol/L Final    Anion Gap 09/01/2022 9  9 - 17 mmol/L Final    Alkaline Phosphatase 09/01/2022 73  35 - 104 U/L Final    ALT 09/01/2022 32  5 - 33 U/L Final    AST 09/01/2022 20  <32 U/L Final    Total Bilirubin 09/01/2022 0.6  0.3 - 1.2 mg/dL Final    Total Protein 09/01/2022 6.6  6.4 - 8.3 g/dL Final    Albumin 09/01/2022 4.0  3.5 - 5.2 g/dL Final    Albumin/Globulin Ratio 09/01/2022 1.5  1.0 - 2.5 Final    GFR Non- 09/01/2022 >60  >60 mL/min Final    GFR  09/01/2022 >60  >60 mL/min Final    GFR Comment 09/01/2022        Final    Comment: Average GFR for 79or more years old:   76 mL/min/1.73sq m  Chronic Kidney Disease:   <60 mL/min/1.73sq m  Kidney failure:   <15 mL/min/1.73sq m              eGFR calculated using average adult body mass.  Additional eGFR calculator available at:        [email or web address]            WBC 09/01/2022 5.3  3.5 - 11.3 k/uL Final    RBC 09/01/2022 5.22 (A) 3.95 - 5.11 m/uL Final    Hemoglobin 09/01/2022 15.6 (A) 11.9 - 15.1 g/dL Final    Hematocrit 09/01/2022 47.6 (A) 36.3 - 47.1 % Final    MCV 09/01/2022 91.2  82.6 - 102.9 fL Final    MCH 09/01/2022 29.9  25.2 - 33.5 pg Final    MCHC 09/01/2022 32.8  25.2 - 33.5 g/dL Final    RDW 09/01/2022 12.9  11.8 - 14.4 % Final    Platelets 15/31/5469 See Reflexed IPF Result  138 - 453 k/uL Final    NRBC Automated 09/01/2022 0.0  0.0 per 100 WBC Final    Seg Neutrophils 09/01/2022 70 (A) 36 - 65 % Final    Lymphocytes 09/01/2022 19 (A) 24 - 43 % Final    Monocytes 09/01/2022 9  3 - 12 % Final    Eosinophils % 09/01/2022 2  1 - 4 % Final    Basophils 09/01/2022 1  0 - 2 % Final    Immature Granulocytes 09/01/2022 0  0 % Final    Segs Absolute 09/01/2022 3.67  1.50 - 8.10 k/uL Final    Absolute Lymph # 09/01/2022 1.00 (A) 1.10 - 3.70 k/uL Final    Absolute Mono # 09/01/2022 0.46  0.10 - 1.20 k/uL Final    Absolute Eos # 09/01/2022 0.09  0.00 - 0.44 k/uL Final    Basophils Absolute 09/01/2022 0.04  0.00 - 0.20 k/uL Final    Absolute Immature Granulocyte 09/01/2022 <0.03  0.00 - 0.30 k/uL Final    Platelet, Immature Fraction 09/01/2022 7.2  1.1 - 10.3 % Final    Platelet, Fluorescence 09/01/2022 138  138 - 453 k/uL Final

## 2022-09-02 NOTE — PROGRESS NOTES
2022     Gurmeet Milan (:  1950) is a 71 y.o. female, here for evaluation of the following medical concerns:    HPI  Patient comes in today for follow up of chronic health issues Patient overall seems to be doing relatively well. Does not have any acute concerns to discuss today. Has a known history of hypertension and her blood pressure is stable and controlled on her current medical therapy. Has a known history of hyperlipidemia and her cholesterol levels are a little more elevated than last check. We did discuss lipid-lowering diet in order to keep this optimally controlled. Patient is aware that she does need to really work on her dietary efforts. Does have a known history of impaired fasting glucose and her blood sugar level is stable and controlled with her current dietary intake. Does have a known history of obstructive sleep apnea and is consistently using her CPAP machine and getting ongoing medical benefit from its use. Patient otherwise has no other acute medical concerns at this time. .  Patient's recent lab reports are as follows:    Results for orders placed or performed during the hospital encounter of 22   Lipid Panel   Result Value Ref Range    Cholesterol 204 (H) <200 mg/dL    HDL 41 >40 mg/dL    LDL Cholesterol 117 0 - 130 mg/dL    Chol/HDL Ratio 5.0 (H) <5    Triglycerides 231 (H) <150 mg/dL   Comprehensive Metabolic Panel   Result Value Ref Range    Glucose 86 70 - 99 mg/dL    BUN 16 8 - 23 mg/dL    Creatinine 0.62 0.50 - 0.90 mg/dL    Bun/Cre Ratio 26 (H) 9 - 20    Calcium 9.4 8.6 - 10.4 mg/dL    Sodium 142 135 - 144 mmol/L    Potassium 4.0 3.7 - 5.3 mmol/L    Chloride 106 98 - 107 mmol/L    CO2 27 20 - 31 mmol/L    Anion Gap 9 9 - 17 mmol/L    Alkaline Phosphatase 73 35 - 104 U/L    ALT 32 5 - 33 U/L    AST 20 <32 U/L    Total Bilirubin 0.6 0.3 - 1.2 mg/dL    Total Protein 6.6 6.4 - 8.3 g/dL    Albumin 4.0 3.5 - 5.2 g/dL    Albumin/Globulin Ratio 1.5 1.0 - 2.5 GFR Non-African American >60 >60 mL/min    GFR African American >60 >60 mL/min    GFR Comment         CBC Auto Differential   Result Value Ref Range    WBC 5.3 3.5 - 11.3 k/uL    RBC 5.22 (H) 3.95 - 5.11 m/uL    Hemoglobin 15.6 (H) 11.9 - 15.1 g/dL    Hematocrit 47.6 (H) 36.3 - 47.1 %    MCV 91.2 82.6 - 102.9 fL    MCH 29.9 25.2 - 33.5 pg    MCHC 32.8 25.2 - 33.5 g/dL    RDW 12.9 11.8 - 14.4 %    Platelets See Reflexed IPF Result 138 - 453 k/uL    NRBC Automated 0.0 0.0 per 100 WBC    Seg Neutrophils 70 (H) 36 - 65 %    Lymphocytes 19 (L) 24 - 43 %    Monocytes 9 3 - 12 %    Eosinophils % 2 1 - 4 %    Basophils 1 0 - 2 %    Immature Granulocytes 0 0 %    Segs Absolute 3.67 1.50 - 8.10 k/uL    Absolute Lymph # 1.00 (L) 1.10 - 3.70 k/uL    Absolute Mono # 0.46 0.10 - 1.20 k/uL    Absolute Eos # 0.09 0.00 - 0.44 k/uL    Basophils Absolute 0.04 0.00 - 0.20 k/uL    Absolute Immature Granulocyte <0.03 0.00 - 0.30 k/uL   Immature Platelet Fraction   Result Value Ref Range    Platelet, Immature Fraction 7.2 1.1 - 10.3 %    Platelet, Fluorescence 138 138 - 453 k/uL      Other review of systems are as noted below. Preventative measures are reviewed today. See health maintenance section for complete preventative plan of care. Did review patient's med list, allergies, social history, fam history, pmhx and pshx today as noted in the record. Review of Systems   Constitutional:  Negative for chills, fatigue and fever. HENT:  Negative for congestion, ear pain, postnasal drip, rhinorrhea, sinus pressure, sore throat and trouble swallowing. Eyes:  Negative for discharge and redness. Respiratory:  Negative for cough, shortness of breath and wheezing. Cardiovascular:  Negative for chest pain. Gastrointestinal:  Negative for abdominal pain, constipation, diarrhea, nausea and vomiting. Genitourinary:  Negative for dysuria, flank pain, frequency and urgency.    Musculoskeletal:  Negative for arthralgias, myalgias and neck pain. Skin:  Negative for rash and wound. Allergic/Immunologic: Negative for environmental allergies. Neurological:  Negative for dizziness, weakness, light-headedness and headaches. Hematological:  Negative for adenopathy. Psychiatric/Behavioral: Negative. Prior to Visit Medications    Medication Sig Taking? Authorizing Provider   rosuvastatin (CRESTOR) 20 MG tablet TAKE 1 TABLET AT BEDTIME  Chucky Calhoun, DO   diclofenac-miSOPROStol (ARTHROTEC 75) 75-0.2 MG per tablet TAKE 1 TABLET DAILY  Ruthine Tip, DO   lisinopril (PRINIVIL;ZESTRIL) 20 MG tablet TAKE 1 TABLET DAILY  Ruthine Tip, DO   amLODIPine (NORVASC) 5 MG tablet TAKE 1 TABLET DAILY  Ruthine Tip, DO   metoprolol tartrate (LOPRESSOR) 50 MG tablet TAKE 1 TABLET DAILY  Desirae MARY KAY Calhoun, DO   SUMAtriptan (IMITREX) 50 MG tablet Take 1 tablet by mouth once as needed for Migraine May repeat an additional dose if needed after 2 hours  Ruthine Tip, DO   Respiratory Therapy Supplies MISC Bipap supplies as needed  Ruthine Tip, DO   Omega-3 Fatty Acids (FISH OIL PO) Take by mouth daily  Historical Provider, MD   Multiple Vitamins-Minerals (PRESERVISION AREDS 2 PO) Take 2 tablets by mouth daily  Historical Provider, MD        Social History     Tobacco Use    Smoking status: Never    Smokeless tobacco: Never   Substance Use Topics    Alcohol use: Yes     Comment: Rare alcohol use. There were no vitals filed for this visit. Estimated body mass index is 38.62 kg/m² as calculated from the following:    Height as of 8/9/22: 5' 3\" (1.6 m). Weight as of 8/9/22: 218 lb (98.9 kg). Physical Exam  Vitals and nursing note reviewed. Constitutional:       General: She is not in acute distress. Appearance: Normal appearance. She is well-developed. She is not diaphoretic. HENT:      Head: Normocephalic and atraumatic.       Right Ear: Tympanic membrane, ear canal and external ear normal.      Left Ear: Tympanic membrane, ear canal and external ear normal.      Nose: Nose normal.      Mouth/Throat:      Mouth: Mucous membranes are moist.      Pharynx: Oropharynx is clear. No oropharyngeal exudate. Eyes:      General:         Right eye: No discharge. Left eye: No discharge. Conjunctiva/sclera: Conjunctivae normal.      Pupils: Pupils are equal, round, and reactive to light. Neck:      Thyroid: No thyromegaly. Cardiovascular:      Rate and Rhythm: Normal rate and regular rhythm. Heart sounds: Normal heart sounds. Pulmonary:      Effort: Pulmonary effort is normal.      Breath sounds: Normal breath sounds. No wheezing or rales. Abdominal:      General: Bowel sounds are normal. There is no distension. Palpations: Abdomen is soft. Tenderness: There is no abdominal tenderness. Musculoskeletal:      Cervical back: Normal range of motion and neck supple. Lymphadenopathy:      Cervical: No cervical adenopathy. Skin:     General: Skin is warm and dry. Findings: No rash. Neurological:      Mental Status: She is alert and oriented to person, place, and time. Psychiatric:         Behavior: Behavior normal.         Thought Content: Thought content normal.         Judgment: Judgment normal.         ASSESSMENT/PLAN:  Encounter Diagnoses   Name Primary?     Essential hypertension Yes    Mixed hyperlipidemia     Impaired fasting blood sugar     DERIAN (obstructive sleep apnea)     Encounter for screening mammogram for malignant neoplasm of breast      Orders Placed This Encounter   Procedures    ASPEN VIVIANE DIGITAL SCREEN BILATERAL     Standing Status:   Future     Standing Expiration Date:   11/2/2023     Order Specific Question:   Reason for exam:     Answer:   screening mammogram    CBC with Auto Differential     Standing Status:   Future     Standing Expiration Date:   9/2/2023    Comprehensive Metabolic Panel     Standing Status:   Future     Standing Expiration Date:   9/2/2023 Hemoglobin A1C     Standing Status:   Future     Standing Expiration Date:   9/2/2023    Lipid Panel     Standing Status:   Future     Standing Expiration Date:   9/2/2023     Order Specific Question:   Is Patient Fasting?/# of Hours     Answer:   12 hours     Orders Placed This Encounter   Medications    metoprolol tartrate (LOPRESSOR) 50 MG tablet     Sig: TAKE 1 TABLET DAILY     Dispense:  90 tablet     Refill:  3     Patient is to continue on her current medical therapy. No additional changes are made at this time. Patient is to continue to follow a low-carb/low sugar/low-fat diet and increase exercise for optimal blood sugar and cholesterol control. Patient is to return to my office in 6 months duration or sooner if any further problems or symptoms arise. (Please note that portions of this note were completed with a voice recognition program. Efforts were made to edit the dictations but occasionally words are mis-transcribed.)      No follow-ups on file. An electronic signature was used to authenticate this note.     --Susana Alexis, DO on 9/2/2022 at 7:30 AM

## 2022-09-26 RX ORDER — AMLODIPINE BESYLATE 5 MG/1
TABLET ORAL
Qty: 90 TABLET | Refills: 1 | Status: SHIPPED | OUTPATIENT
Start: 2022-09-26

## 2022-09-26 NOTE — TELEPHONE ENCOUNTER
Nilda Dozier called requesting a refill of the below medication which has been pended for you:     Requested Prescriptions     Pending Prescriptions Disp Refills    amLODIPine (NORVASC) 5 MG tablet [Pharmacy Med Name: AMLODIPINE BESYLATE TABS 5MG] 90 tablet 3     Sig: TAKE 1 TABLET DAILY       Last Appointment Date: 9/2/2022  Next Appointment Date: 3/6/2023    Allergies   Allergen Reactions    Simvastatin Other (See Comments)     Muscle cramps in legs

## 2022-09-29 ENCOUNTER — HOSPITAL ENCOUNTER (OUTPATIENT)
Dept: MAMMOGRAPHY | Age: 72
Discharge: HOME | End: 2022-10-01
Payer: MEDICARE

## 2022-09-29 DIAGNOSIS — Z12.31 ENCOUNTER FOR SCREENING MAMMOGRAM FOR MALIGNANT NEOPLASM OF BREAST: ICD-10-CM

## 2022-09-29 PROCEDURE — 77063 BREAST TOMOSYNTHESIS BI: CPT

## 2022-11-03 RX ORDER — DICLOFENAC SODIUM AND MISOPROSTOL 75; 200 MG/1; UG/1
TABLET, DELAYED RELEASE ORAL
Qty: 90 TABLET | Refills: 3 | Status: SHIPPED | OUTPATIENT
Start: 2022-11-03

## 2022-11-03 NOTE — TELEPHONE ENCOUNTER
Flip Funez called requesting a refill of the below medication which has been pended for you:     Requested Prescriptions     Pending Prescriptions Disp Refills    diclofenac-miSOPROStol (ARTHROTEC 75) 75-0.2 MG per tablet [Pharmacy Med Name: DICLOFENAC/MISOPROSTOL TABS 75/.2MG] 90 tablet 3     Sig: TAKE 1 TABLET DAILY       Last Appointment Date: 9/2/2022  Next Appointment Date: 3/6/2023    Allergies   Allergen Reactions    Simvastatin Other (See Comments)     Muscle cramps in legs

## 2022-12-21 DIAGNOSIS — G47.33 OBSTRUCTIVE SLEEP APNEA: Primary | ICD-10-CM

## 2023-02-04 ENCOUNTER — OFFICE VISIT (OUTPATIENT)
Dept: PRIMARY CARE CLINIC | Age: 73
End: 2023-02-04
Payer: MEDICARE

## 2023-02-04 VITALS
BODY MASS INDEX: 39.25 KG/M2 | DIASTOLIC BLOOD PRESSURE: 80 MMHG | WEIGHT: 221.6 LBS | TEMPERATURE: 98.2 F | SYSTOLIC BLOOD PRESSURE: 132 MMHG | OXYGEN SATURATION: 98 % | HEART RATE: 70 BPM

## 2023-02-04 DIAGNOSIS — M70.61 TROCHANTERIC BURSITIS OF BOTH HIPS: ICD-10-CM

## 2023-02-04 DIAGNOSIS — M70.62 TROCHANTERIC BURSITIS OF BOTH HIPS: ICD-10-CM

## 2023-02-04 DIAGNOSIS — M25.552 BILATERAL HIP PAIN: Primary | ICD-10-CM

## 2023-02-04 DIAGNOSIS — M25.551 BILATERAL HIP PAIN: Primary | ICD-10-CM

## 2023-02-04 PROCEDURE — 99212 OFFICE O/P EST SF 10 MIN: CPT | Performed by: FAMILY MEDICINE

## 2023-02-04 PROCEDURE — 20610 DRAIN/INJ JOINT/BURSA W/O US: CPT | Performed by: FAMILY MEDICINE

## 2023-02-04 RX ORDER — TRIAMCINOLONE ACETONIDE 40 MG/ML
40 INJECTION, SUSPENSION INTRA-ARTICULAR; INTRAMUSCULAR ONCE
Status: COMPLETED | OUTPATIENT
Start: 2023-02-04 | End: 2023-02-04

## 2023-02-04 RX ADMIN — TRIAMCINOLONE ACETONIDE 40 MG: 40 INJECTION, SUSPENSION INTRA-ARTICULAR; INTRAMUSCULAR at 11:48

## 2023-02-04 RX ADMIN — TRIAMCINOLONE ACETONIDE 40 MG: 40 INJECTION, SUSPENSION INTRA-ARTICULAR; INTRAMUSCULAR at 11:49

## 2023-02-04 ASSESSMENT — PATIENT HEALTH QUESTIONNAIRE - PHQ9
SUM OF ALL RESPONSES TO PHQ QUESTIONS 1-9: 0
SUM OF ALL RESPONSES TO PHQ QUESTIONS 1-9: 0
SUM OF ALL RESPONSES TO PHQ9 QUESTIONS 1 & 2: 0
2. FEELING DOWN, DEPRESSED OR HOPELESS: 0
1. LITTLE INTEREST OR PLEASURE IN DOING THINGS: 0
SUM OF ALL RESPONSES TO PHQ QUESTIONS 1-9: 0
SUM OF ALL RESPONSES TO PHQ QUESTIONS 1-9: 0

## 2023-02-04 NOTE — PROGRESS NOTES
2023     Maame Marrero (:  1950) is a 71 y.o. female, here for evaluation of the following medical concerns:    Hip Pain   The incident occurred more than 1 week ago (has had recurrent pain to the lateral aspect of bilateral hips in last few weeks. ). There was no injury mechanism. The pain is present in the left hip and right hip. The quality of the pain is described as aching. The pain is moderate. The pain has been Constant since onset. Pertinent negatives include no inability to bear weight, muscle weakness, numbness or tingling. The symptoms are aggravated by movement, palpation and weight bearing. She has tried NSAIDs and acetaminophen for the symptoms. The treatment provided no relief. Did review patient's med list, allergies, social history,pmhx and pshx today as noted in the record. Review of Systems   Constitutional:  Negative for chills, fatigue and fever. Musculoskeletal:  Positive for arthralgias, gait problem and myalgias. Neurological:  Negative for tingling and numbness. Prior to Visit Medications    Medication Sig Taking?  Authorizing Provider   Respiratory Therapy Supplies MISC Bipap supplies Yes James Wray, DO   diclofenac-miSOPROStol (ARTHROTEC 75) 75-0.2 MG per tablet TAKE 1 TABLET DAILY Yes James Wray, DO   amLODIPine (NORVASC) 5 MG tablet TAKE 1 TABLET DAILY Yes James Wray, DO   metoprolol tartrate (LOPRESSOR) 50 MG tablet TAKE 1 TABLET DAILY Yes James Wray, DO   rosuvastatin (CRESTOR) 20 MG tablet TAKE 1 TABLET AT BEDTIME Yes James Wray, DO   lisinopril (PRINIVIL;ZESTRIL) 20 MG tablet TAKE 1 TABLET DAILY Yes James Wray, DO   Omega-3 Fatty Acids (FISH OIL PO) Take by mouth daily Yes Historical Provider, MD   Multiple Vitamins-Minerals (PRESERVISION AREDS 2 PO) Take 2 tablets by mouth daily Yes Historical Provider, MD   SUMAtriptan (IMITREX) 50 MG tablet Take 1 tablet by mouth once as needed for Migraine May repeat an additional dose if needed after 2 hours  Cloretta [place], DO        Social History     Tobacco Use    Smoking status: Never    Smokeless tobacco: Never   Substance Use Topics    Alcohol use: Yes     Comment: Rare alcohol use. Vitals:    02/04/23 1116   BP: 132/80   Site: Left Upper Arm   Position: Sitting   Cuff Size: Large Adult   Pulse: 70   Temp: 98.2 °F (36.8 °C)   TempSrc: Tympanic   SpO2: 98%   Weight: 221 lb 9.6 oz (100.5 kg)     Estimated body mass index is 39.25 kg/m² as calculated from the following:    Height as of 9/2/22: 5' 3\" (1.6 m). Weight as of this encounter: 221 lb 9.6 oz (100.5 kg). Physical Exam  Vitals and nursing note reviewed. Constitutional:       General: She is not in acute distress. Appearance: She is well-developed. She is not diaphoretic. HENT:      Head: Normocephalic and atraumatic. Eyes:      Conjunctiva/sclera: Conjunctivae normal.   Pulmonary:      Effort: Pulmonary effort is normal.   Musculoskeletal:         General: Tenderness present. No swelling. Normal range of motion. Cervical back: Normal range of motion. Comments: Pain with palpation to the bilateral hips over the greater trochanteric bursa. There is pain with palpation down the lateral aspect of bilateral thighs. Moves the lower extremities in a normal range of motion without difficulty. Skin:     General: Skin is warm and dry. Coloration: Skin is not pale. Findings: No erythema or rash. Neurological:      Mental Status: She is alert and oriented to person, place, and time. Psychiatric:         Behavior: Behavior normal.         Thought Content: Thought content normal.         Judgment: Judgment normal.       ASSESSMENT/PLAN:  Encounter Diagnoses   Name Primary?     Bilateral hip pain Yes    Trochanteric bursitis of both hips      Orders Placed This Encounter   Medications    triamcinolone acetonide (KENALOG-40) injection 40 mg    triamcinolone acetonide (KENALOG-40) injection 40 mg     Orders Placed This Encounter   Procedures    MO ARTHROCENTESIS ASPIR&/INJ MAJOR JT/BURSA W/O US    MO ARTHROCENTESIS ASPIR&/INJ MAJOR JT/BURSA W/O US     Procedure Note    PREOP DIAGNOSIS:  [] Right []  Left    [x] Bilateral Hip Pain    POSTOP DIAGNOSIS:  [] Right []  Left    [x] Bilateral Hip Pain    OPERATION:  [] Right []  Left    [x] Bilateral Greater trochanter injection      PROCEDURE:  After sterile prep, 40 mg of Kenalog and 2 mL of 1% Xylocaine without epinephrine was injected into the bursa without incident. Pre- and post neurovascular status verified. No complications noted. Informed consent and time out was completed prior to procedure    Tylenol/Motrin prn    Would do stretching exercises as discussed. Would avoid laying on sides while sleeping. Return  if no improvement in symptoms or if any further symptoms arise. No follow-ups on file. An electronic signature was used to authenticate this note.     --Cat Delaney, DO on 2/4/2023 at 12:02 PM

## 2023-03-02 ENCOUNTER — HOSPITAL ENCOUNTER (OUTPATIENT)
Age: 73
Discharge: HOME | End: 2023-03-02
Payer: MEDICARE

## 2023-03-02 DIAGNOSIS — I10 ESSENTIAL HYPERTENSION: ICD-10-CM

## 2023-03-02 DIAGNOSIS — E78.2 MIXED HYPERLIPIDEMIA: ICD-10-CM

## 2023-03-02 DIAGNOSIS — R73.01 IMPAIRED FASTING BLOOD SUGAR: ICD-10-CM

## 2023-03-02 LAB
ABSOLUTE EOS #: 0.03 K/UL (ref 0–0.44)
ABSOLUTE IMMATURE GRANULOCYTE: <0.03 K/UL (ref 0–0.3)
ABSOLUTE LYMPH #: 1.07 K/UL (ref 1.1–3.7)
ABSOLUTE MONO #: 0.44 K/UL (ref 0.1–1.2)
ALBUMIN SERPL-MCNC: 4.3 G/DL (ref 3.5–5.2)
ALBUMIN/GLOBULIN RATIO: 1.7 (ref 1–2.5)
ALP SERPL-CCNC: 75 U/L (ref 35–104)
ALT SERPL-CCNC: 32 U/L (ref 5–33)
ANION GAP SERPL CALCULATED.3IONS-SCNC: 8 MMOL/L (ref 9–17)
AST SERPL-CCNC: 19 U/L
BASOPHILS # BLD: 1 % (ref 0–2)
BASOPHILS ABSOLUTE: 0.03 K/UL (ref 0–0.2)
BILIRUB SERPL-MCNC: 0.5 MG/DL (ref 0.3–1.2)
BUN SERPL-MCNC: 14 MG/DL (ref 8–23)
BUN/CREAT BLD: 20 (ref 9–20)
CALCIUM SERPL-MCNC: 9.7 MG/DL (ref 8.6–10.4)
CHLORIDE SERPL-SCNC: 104 MMOL/L (ref 98–107)
CHOLEST SERPL-MCNC: 189 MG/DL
CHOLESTEROL/HDL RATIO: 4.6
CO2 SERPL-SCNC: 28 MMOL/L (ref 20–31)
CREAT SERPL-MCNC: 0.71 MG/DL (ref 0.5–0.9)
EOSINOPHILS RELATIVE PERCENT: 1 % (ref 1–4)
GFR SERPL CREATININE-BSD FRML MDRD: >60 ML/MIN/1.73M2
GLUCOSE SERPL-MCNC: 98 MG/DL (ref 70–99)
HCT VFR BLD AUTO: 48.8 % (ref 36.3–47.1)
HDLC SERPL-MCNC: 41 MG/DL
HGB BLD-MCNC: 15.9 G/DL (ref 11.9–15.1)
IMMATURE GRANULOCYTES: 0 %
LDLC SERPL CALC-MCNC: 109 MG/DL (ref 0–130)
LYMPHOCYTES # BLD: 22 % (ref 24–43)
MCH RBC QN AUTO: 28.6 PG (ref 25.2–33.5)
MCHC RBC AUTO-ENTMCNC: 32.6 G/DL (ref 25.2–33.5)
MCV RBC AUTO: 87.9 FL (ref 82.6–102.9)
MONOCYTES # BLD: 9 % (ref 3–12)
NRBC AUTOMATED: 0 PER 100 WBC
PDW BLD-RTO: 13.1 % (ref 11.8–14.4)
PLATELET # BLD AUTO: 149 K/UL (ref 138–453)
PMV BLD AUTO: 11.2 FL (ref 8.1–13.5)
POTASSIUM SERPL-SCNC: 4.4 MMOL/L (ref 3.7–5.3)
PROT SERPL-MCNC: 6.8 G/DL (ref 6.4–8.3)
RBC # BLD: 5.55 M/UL (ref 3.95–5.11)
SEG NEUTROPHILS: 67 % (ref 36–65)
SEGMENTED NEUTROPHILS ABSOLUTE COUNT: 3.4 K/UL (ref 1.5–8.1)
SODIUM SERPL-SCNC: 140 MMOL/L (ref 135–144)
TRIGL SERPL-MCNC: 196 MG/DL
WBC # BLD AUTO: 5 K/UL (ref 3.5–11.3)

## 2023-03-02 PROCEDURE — 80061 LIPID PANEL: CPT

## 2023-03-02 PROCEDURE — 83036 HEMOGLOBIN GLYCOSYLATED A1C: CPT

## 2023-03-02 PROCEDURE — 36415 COLL VENOUS BLD VENIPUNCTURE: CPT

## 2023-03-02 PROCEDURE — 80053 COMPREHEN METABOLIC PANEL: CPT

## 2023-03-02 PROCEDURE — 85025 COMPLETE CBC W/AUTO DIFF WBC: CPT

## 2023-03-03 LAB
EST. AVERAGE GLUCOSE BLD GHB EST-MCNC: 117 MG/DL
HBA1C MFR BLD: 5.7 % (ref 4–6)

## 2023-03-06 ENCOUNTER — OFFICE VISIT (OUTPATIENT)
Dept: FAMILY MEDICINE CLINIC | Age: 73
End: 2023-03-06
Payer: MEDICARE

## 2023-03-06 VITALS
HEIGHT: 63 IN | TEMPERATURE: 98.2 F | HEART RATE: 52 BPM | OXYGEN SATURATION: 97 % | BODY MASS INDEX: 37.74 KG/M2 | WEIGHT: 213 LBS | RESPIRATION RATE: 16 BRPM | DIASTOLIC BLOOD PRESSURE: 84 MMHG | SYSTOLIC BLOOD PRESSURE: 134 MMHG

## 2023-03-06 DIAGNOSIS — D69.6 THROMBOCYTOPENIA (HCC): ICD-10-CM

## 2023-03-06 DIAGNOSIS — R73.01 IMPAIRED FASTING BLOOD SUGAR: ICD-10-CM

## 2023-03-06 DIAGNOSIS — I10 ESSENTIAL HYPERTENSION: Primary | ICD-10-CM

## 2023-03-06 DIAGNOSIS — G47.33 OSA (OBSTRUCTIVE SLEEP APNEA): ICD-10-CM

## 2023-03-06 DIAGNOSIS — E78.2 MIXED HYPERLIPIDEMIA: ICD-10-CM

## 2023-03-06 PROCEDURE — G8400 PT W/DXA NO RESULTS DOC: HCPCS | Performed by: FAMILY MEDICINE

## 2023-03-06 PROCEDURE — G8417 CALC BMI ABV UP PARAM F/U: HCPCS | Performed by: FAMILY MEDICINE

## 2023-03-06 PROCEDURE — 1090F PRES/ABSN URINE INCON ASSESS: CPT | Performed by: FAMILY MEDICINE

## 2023-03-06 PROCEDURE — 3075F SYST BP GE 130 - 139MM HG: CPT | Performed by: FAMILY MEDICINE

## 2023-03-06 PROCEDURE — 99214 OFFICE O/P EST MOD 30 MIN: CPT | Performed by: FAMILY MEDICINE

## 2023-03-06 PROCEDURE — 99212 OFFICE O/P EST SF 10 MIN: CPT | Performed by: FAMILY MEDICINE

## 2023-03-06 PROCEDURE — 1036F TOBACCO NON-USER: CPT | Performed by: FAMILY MEDICINE

## 2023-03-06 PROCEDURE — 3079F DIAST BP 80-89 MM HG: CPT | Performed by: FAMILY MEDICINE

## 2023-03-06 PROCEDURE — 3017F COLORECTAL CA SCREEN DOC REV: CPT | Performed by: FAMILY MEDICINE

## 2023-03-06 PROCEDURE — 1123F ACP DISCUSS/DSCN MKR DOCD: CPT | Performed by: FAMILY MEDICINE

## 2023-03-06 PROCEDURE — G8427 DOCREV CUR MEDS BY ELIG CLIN: HCPCS | Performed by: FAMILY MEDICINE

## 2023-03-06 PROCEDURE — G8484 FLU IMMUNIZE NO ADMIN: HCPCS | Performed by: FAMILY MEDICINE

## 2023-03-06 SDOH — ECONOMIC STABILITY: FOOD INSECURITY: WITHIN THE PAST 12 MONTHS, YOU WORRIED THAT YOUR FOOD WOULD RUN OUT BEFORE YOU GOT MONEY TO BUY MORE.: NEVER TRUE

## 2023-03-06 SDOH — ECONOMIC STABILITY: INCOME INSECURITY: HOW HARD IS IT FOR YOU TO PAY FOR THE VERY BASICS LIKE FOOD, HOUSING, MEDICAL CARE, AND HEATING?: NOT HARD AT ALL

## 2023-03-06 SDOH — ECONOMIC STABILITY: HOUSING INSECURITY
IN THE LAST 12 MONTHS, WAS THERE A TIME WHEN YOU DID NOT HAVE A STEADY PLACE TO SLEEP OR SLEPT IN A SHELTER (INCLUDING NOW)?: NO

## 2023-03-06 SDOH — ECONOMIC STABILITY: FOOD INSECURITY: WITHIN THE PAST 12 MONTHS, THE FOOD YOU BOUGHT JUST DIDN'T LAST AND YOU DIDN'T HAVE MONEY TO GET MORE.: NEVER TRUE

## 2023-03-06 ASSESSMENT — ENCOUNTER SYMPTOMS
RHINORRHEA: 0
COUGH: 0
CONSTIPATION: 0
NAUSEA: 0
DIARRHEA: 0
ABDOMINAL PAIN: 0
VOMITING: 0
TROUBLE SWALLOWING: 0
WHEEZING: 0
EYE REDNESS: 0
SHORTNESS OF BREATH: 0
EYE DISCHARGE: 0
SORE THROAT: 0
SINUS PRESSURE: 0

## 2023-03-06 NOTE — PROGRESS NOTES
3/6/2023     Phuong Peres (:  1950) is a 71 y.o. female, here for evaluation of the following medical concerns:    HPI  Patient comes in today for follow up of chronic health issues Patient seems to be relatively stable overall. Does have a known history of hypertension her blood pressure is stable and controlled on her current medical therapy. Has known history of hyperlipidemia and her triglycerides are somewhat elevated but better than last check. Did encourage continued lipid-lowering diet to keep this optimally controlled. She is stable with her current statin dose. She does have a known history of impaired fasting glucose and her blood sugar levels are stable and controlled with her current dietary regimen. Did encourage her to continue with low-carb/low sugar diet to keep this optimally controlled. Has a known history of thrombocytopenia and her platelet count is normal at this time. Does have a known history of obstructive sleep apnea and is consistently using her CPAP machine. Is compliant with its use and is getting ongoing medical benefit. .  Patient's recent lab reports are as follows:    Results for orders placed or performed during the hospital encounter of 23   Lipid Panel   Result Value Ref Range    Cholesterol 189 <200 mg/dL    HDL 41 >40 mg/dL    LDL Cholesterol 109 0 - 130 mg/dL    Chol/HDL Ratio 4.6 <5    Triglycerides 196 (H) <150 mg/dL   Hemoglobin A1C   Result Value Ref Range    Hemoglobin A1C 5.7 4.0 - 6.0 %    Estimated Avg Glucose 117 mg/dL   Comprehensive Metabolic Panel   Result Value Ref Range    Glucose 98 70 - 99 mg/dL    BUN 14 8 - 23 mg/dL    Creatinine 0.71 0.50 - 0.90 mg/dL    Est, Glom Filt Rate >60 >60 mL/min/1.73m2    Bun/Cre Ratio 20 9 - 20    Calcium 9.7 8.6 - 10.4 mg/dL    Sodium 140 135 - 144 mmol/L    Potassium 4.4 3.7 - 5.3 mmol/L    Chloride 104 98 - 107 mmol/L    CO2 28 20 - 31 mmol/L    Anion Gap 8 (L) 9 - 17 mmol/L    Alkaline Phosphatase 75 35 - 104 U/L    ALT 32 5 - 33 U/L    AST 19 <32 U/L    Total Bilirubin 0.5 0.3 - 1.2 mg/dL    Total Protein 6.8 6.4 - 8.3 g/dL    Albumin 4.3 3.5 - 5.2 g/dL    Albumin/Globulin Ratio 1.7 1.0 - 2.5   CBC with Auto Differential   Result Value Ref Range    WBC 5.0 3.5 - 11.3 k/uL    RBC 5.55 (H) 3.95 - 5.11 m/uL    Hemoglobin 15.9 (H) 11.9 - 15.1 g/dL    Hematocrit 48.8 (H) 36.3 - 47.1 %    MCV 87.9 82.6 - 102.9 fL    MCH 28.6 25.2 - 33.5 pg    MCHC 32.6 25.2 - 33.5 g/dL    RDW 13.1 11.8 - 14.4 %    Platelets 252 129 - 852 k/uL    MPV 11.2 8.1 - 13.5 fL    NRBC Automated 0.0 0.0 per 100 WBC    Seg Neutrophils 67 (H) 36 - 65 %    Lymphocytes 22 (L) 24 - 43 %    Monocytes 9 3 - 12 %    Eosinophils % 1 1 - 4 %    Basophils 1 0 - 2 %    Immature Granulocytes 0 0 %    Segs Absolute 3.40 1.50 - 8.10 k/uL    Absolute Lymph # 1.07 (L) 1.10 - 3.70 k/uL    Absolute Mono # 0.44 0.10 - 1.20 k/uL    Absolute Eos # 0.03 0.00 - 0.44 k/uL    Basophils Absolute 0.03 0.00 - 0.20 k/uL    Absolute Immature Granulocyte <0.03 0.00 - 0.30 k/uL      Other review of systems are as noted below. Preventative measures are reviewed today. See health maintenance section for complete preventative plan of care. Did review patient's med list, allergies, social history, fam history, pmhx and pshx today as noted in the record. Review of Systems   Constitutional:  Negative for chills, fatigue and fever. HENT:  Negative for congestion, ear pain, postnasal drip, rhinorrhea, sinus pressure, sore throat and trouble swallowing. Eyes:  Negative for discharge and redness. Respiratory:  Negative for cough, shortness of breath and wheezing. Cardiovascular:  Negative for chest pain. Gastrointestinal:  Negative for abdominal pain, constipation, diarrhea, nausea and vomiting. Genitourinary:  Negative for dysuria, flank pain, frequency and urgency. Musculoskeletal:  Negative for arthralgias, myalgias and neck pain.    Skin:  Negative for rash and wound. Allergic/Immunologic: Negative for environmental allergies. Neurological:  Negative for dizziness, weakness, light-headedness and headaches. Hematological:  Negative for adenopathy. Psychiatric/Behavioral: Negative. Prior to Visit Medications    Medication Sig Taking? Authorizing Provider   Respiratory Therapy Supplies MISC Bipap supplies  Anabel Navjhon, DO   diclofenac-miSOPROStol (ARTHROTEC 75) 75-0.2 MG per tablet TAKE 1 TABLET DAILY  Anabel [place], DO   amLODIPine (NORVASC) 5 MG tablet TAKE 1 TABLET DAILY  Anabel Navy, DO   metoprolol tartrate (LOPRESSOR) 50 MG tablet TAKE 1 TABLET DAILY  Anabel Navy, DO   rosuvastatin (CRESTOR) 20 MG tablet TAKE 1 TABLET AT BEDTIME  Anabel Navjhon, DO   lisinopril (PRINIVIL;ZESTRIL) 20 MG tablet TAKE 1 TABLET DAILY  Desirae Calhoun, DO   SUMAtriptan (IMITREX) 50 MG tablet Take 1 tablet by mouth once as needed for Migraine May repeat an additional dose if needed after 2 hours  Desirae Calhoun, DO   Omega-3 Fatty Acids (FISH OIL PO) Take by mouth daily  Historical Provider, MD   Multiple Vitamins-Minerals (PRESERVISION AREDS 2 PO) Take 2 tablets by mouth daily  Historical Provider, MD        Social History     Tobacco Use    Smoking status: Never    Smokeless tobacco: Never   Substance Use Topics    Alcohol use: Yes     Comment: Rare alcohol use. There were no vitals filed for this visit. Estimated body mass index is 39.25 kg/m² as calculated from the following:    Height as of 9/2/22: 5' 3\" (1.6 m). Weight as of 2/4/23: 221 lb 9.6 oz (100.5 kg). Physical Exam  Vitals and nursing note reviewed. Constitutional:       General: She is not in acute distress. Appearance: Normal appearance. She is well-developed. She is not diaphoretic. HENT:      Head: Normocephalic and atraumatic.       Right Ear: Tympanic membrane, ear canal and external ear normal.      Left Ear: Tympanic membrane, ear canal and external ear normal.      Nose: Nose normal.      Mouth/Throat:      Mouth: Mucous membranes are moist.      Pharynx: Oropharynx is clear. No oropharyngeal exudate. Eyes:      General:         Right eye: No discharge. Left eye: No discharge. Conjunctiva/sclera: Conjunctivae normal.      Pupils: Pupils are equal, round, and reactive to light. Neck:      Thyroid: No thyromegaly. Cardiovascular:      Rate and Rhythm: Normal rate and regular rhythm. Heart sounds: Normal heart sounds. Pulmonary:      Effort: Pulmonary effort is normal.      Breath sounds: Normal breath sounds. No wheezing or rales. Abdominal:      General: Bowel sounds are normal. There is no distension. Palpations: Abdomen is soft. Tenderness: There is no abdominal tenderness. Musculoskeletal:      Cervical back: Normal range of motion and neck supple. Lymphadenopathy:      Cervical: No cervical adenopathy. Skin:     General: Skin is warm and dry. Findings: No rash. Neurological:      Mental Status: She is alert and oriented to person, place, and time. Psychiatric:         Behavior: Behavior normal.         Thought Content: Thought content normal.         Judgment: Judgment normal.         ASSESSMENT/PLAN:    Encounter Diagnoses   Name Primary? Essential hypertension Yes    Mixed hyperlipidemia     Impaired fasting blood sugar     Thrombocytopenia (HCC)     DERIAN (obstructive sleep apnea)      No orders of the defined types were placed in this encounter.   Orders Placed This Encounter   Procedures    CBC with Auto Differential     Standing Status:   Future     Standing Expiration Date:   3/5/2024    Comprehensive Metabolic Panel     Standing Status:   Future     Standing Expiration Date:   3/5/2024    Hemoglobin A1C     Standing Status:   Future     Standing Expiration Date:   3/5/2024    Lipid Panel     Standing Status:   Future     Standing Expiration Date:   3/5/2024     Order Specific Question: Is Patient Fasting?/# of Hours     Answer:   12 hours     Patient is to continue on her current medical therapy. No changes are made. Patient is to continue to follow a low carb/low sugar/low fat diet and increase exercise for optimal blood sugar and cholesterol control. Patient is to continue with cpap use. Is getting ongoing medical benefit from its use and is compliant with daily use. Patient is to return to my office in 6 months duration or sooner if any further problems or symptoms arise. (Please note that portions of this note were completed with a voice recognition program. Efforts were made to edit the dictations but occasionally words are mis-transcribed.)    No follow-ups on file. An electronic signature was used to authenticate this note.     --Anisha Huitron,  on 3/6/2023 at 7:24 AM

## 2023-03-06 NOTE — PATIENT INSTRUCTIONS
Hospital Outpatient Visit on 03/02/2023   Component Date Value Ref Range Status    Cholesterol 03/02/2023 189  <200 mg/dL Final    Comment:    Cholesterol Guidelines:      <200  Desirable   200-240  Borderline      >240  Undesirable         HDL 03/02/2023 41  >40 mg/dL Final    Comment:    HDL Guidelines:    <40     Undesirable   40-59    Borderline    >59     Desirable         LDL Cholesterol 03/02/2023 109  0 - 130 mg/dL Final    Comment:    LDL Guidelines:     <100    Desirable   100-129   Near to/above Desirable   130-159   Borderline      >159   Undesirable     Direct (measured) LDL and calculated LDL are not interchangeable tests. Chol/HDL Ratio 03/02/2023 4.6  <5 Final            Triglycerides 03/02/2023 196 (A)  <150 mg/dL Final    Comment:    Triglyceride Guidelines:     <150   Desirable   150-199  Borderline   200-499  High     >499   Very high   Based on AHA Guidelines for fasting triglyceride, October 2012. Hemoglobin A1C 03/02/2023 5.7  4.0 - 6.0 % Final    Estimated Avg Glucose 03/02/2023 117  mg/dL Final    Comment: The ADA and AACC recommend providing the estimated average glucose result to permit better   patient understanding of their HBA1c result. Glucose 03/02/2023 98  70 - 99 mg/dL Final    BUN 03/02/2023 14  8 - 23 mg/dL Final    Creatinine 03/02/2023 0.71  0.50 - 0.90 mg/dL Final    Est, Glom Filt Rate 03/02/2023 >60  >60 mL/min/1.73m2 Final    Comment:       These results are not intended for use in patients <25years of age. eGFR results are calculated without a race factor using the 2021 CKD-EPI equation. Careful clinical correlation is recommended, particularly when comparing to results   calculated using previous equations. The CKD-EPI equation is less accurate in patients with extremes of muscle mass, extra-renal   metabolism of creatine, excessive creatine ingestion, or following therapy that affects   renal tubular secretion.       Bun/Cre Ratio 03/02/2023 20 9 - 20 Final    Calcium 03/02/2023 9.7  8.6 - 10.4 mg/dL Final    Sodium 03/02/2023 140  135 - 144 mmol/L Final    Potassium 03/02/2023 4.4  3.7 - 5.3 mmol/L Final    Chloride 03/02/2023 104  98 - 107 mmol/L Final    CO2 03/02/2023 28  20 - 31 mmol/L Final    Anion Gap 03/02/2023 8 (A)  9 - 17 mmol/L Final    Alkaline Phosphatase 03/02/2023 75  35 - 104 U/L Final    ALT 03/02/2023 32  5 - 33 U/L Final    AST 03/02/2023 19  <32 U/L Final    Total Bilirubin 03/02/2023 0.5  0.3 - 1.2 mg/dL Final    Total Protein 03/02/2023 6.8  6.4 - 8.3 g/dL Final    Albumin 03/02/2023 4.3  3.5 - 5.2 g/dL Final    Albumin/Globulin Ratio 03/02/2023 1.7  1.0 - 2.5 Final    WBC 03/02/2023 5.0  3.5 - 11.3 k/uL Final    RBC 03/02/2023 5.55 (A)  3.95 - 5.11 m/uL Final    Hemoglobin 03/02/2023 15.9 (A)  11.9 - 15.1 g/dL Final    Hematocrit 03/02/2023 48.8 (A)  36.3 - 47.1 % Final    MCV 03/02/2023 87.9  82.6 - 102.9 fL Final    MCH 03/02/2023 28.6  25.2 - 33.5 pg Final    MCHC 03/02/2023 32.6  25.2 - 33.5 g/dL Final    RDW 03/02/2023 13.1  11.8 - 14.4 % Final    Platelets 92/93/4459 149  138 - 453 k/uL Final    MPV 03/02/2023 11.2  8.1 - 13.5 fL Final    NRBC Automated 03/02/2023 0.0  0.0 per 100 WBC Final    Seg Neutrophils 03/02/2023 67 (A)  36 - 65 % Final    Lymphocytes 03/02/2023 22 (A)  24 - 43 % Final    Monocytes 03/02/2023 9  3 - 12 % Final    Eosinophils % 03/02/2023 1  1 - 4 % Final    Basophils 03/02/2023 1  0 - 2 % Final    Immature Granulocytes 03/02/2023 0  0 % Final    Segs Absolute 03/02/2023 3.40  1.50 - 8.10 k/uL Final    Absolute Lymph # 03/02/2023 1.07 (A)  1.10 - 3.70 k/uL Final    Absolute Mono # 03/02/2023 0.44  0.10 - 1.20 k/uL Final    Absolute Eos # 03/02/2023 0.03  0.00 - 0.44 k/uL Final    Basophils Absolute 03/02/2023 0.03  0.00 - 0.20 k/uL Final    Absolute Immature Granulocyte 03/02/2023 <0.03  0.00 - 0.30 k/uL Final

## 2023-04-17 RX ORDER — LISINOPRIL 20 MG/1
TABLET ORAL
Qty: 90 TABLET | Refills: 3 | Status: SHIPPED | OUTPATIENT
Start: 2023-04-17

## 2023-04-17 NOTE — TELEPHONE ENCOUNTER
Magno Diaz called requesting a refill of the below medication which has been pended for you:     Requested Prescriptions     Pending Prescriptions Disp Refills    lisinopril (PRINIVIL;ZESTRIL) 20 MG tablet [Pharmacy Med Name: Lisinopril 20 MG Oral Tablet] 90 tablet 3     Sig: TAKE 1 TABLET DAILY       Last Appointment Date: 3/6/2023  Next Appointment Date: 9/6/2023    Allergies   Allergen Reactions    Simvastatin Other (See Comments)     Muscle cramps in legs

## 2023-05-31 ENCOUNTER — OFFICE VISIT (OUTPATIENT)
Dept: FAMILY MEDICINE CLINIC | Age: 73
End: 2023-05-31
Payer: MEDICARE

## 2023-05-31 VITALS
WEIGHT: 214 LBS | HEART RATE: 76 BPM | SYSTOLIC BLOOD PRESSURE: 138 MMHG | OXYGEN SATURATION: 97 % | BODY MASS INDEX: 37.92 KG/M2 | HEIGHT: 63 IN | DIASTOLIC BLOOD PRESSURE: 84 MMHG | TEMPERATURE: 97.7 F | RESPIRATION RATE: 18 BRPM

## 2023-05-31 DIAGNOSIS — M25.552 LEFT HIP PAIN: Primary | ICD-10-CM

## 2023-05-31 DIAGNOSIS — M70.62 TROCHANTERIC BURSITIS OF LEFT HIP: ICD-10-CM

## 2023-05-31 DIAGNOSIS — M25.50 GENERALIZED JOINT PAIN: ICD-10-CM

## 2023-05-31 PROCEDURE — 99213 OFFICE O/P EST LOW 20 MIN: CPT | Performed by: FAMILY MEDICINE

## 2023-05-31 PROCEDURE — 20610 DRAIN/INJ JOINT/BURSA W/O US: CPT | Performed by: FAMILY MEDICINE

## 2023-05-31 RX ORDER — PREDNISONE 20 MG/1
TABLET ORAL
Qty: 15 TABLET | Refills: 0 | Status: SHIPPED | OUTPATIENT
Start: 2023-05-31

## 2023-05-31 RX ORDER — TRIAMCINOLONE ACETONIDE 40 MG/ML
40 INJECTION, SUSPENSION INTRA-ARTICULAR; INTRAMUSCULAR ONCE
Status: COMPLETED | OUTPATIENT
Start: 2023-05-31 | End: 2023-05-31

## 2023-05-31 RX ADMIN — TRIAMCINOLONE ACETONIDE 40 MG: 40 INJECTION, SUSPENSION INTRA-ARTICULAR; INTRAMUSCULAR at 12:05

## 2023-05-31 NOTE — PROGRESS NOTES
General: Skin is warm and dry. Coloration: Skin is not pale. Findings: No erythema or rash. Neurological:      Mental Status: She is alert and oriented to person, place, and time. Psychiatric:         Behavior: Behavior normal.         Thought Content: Thought content normal.         Judgment: Judgment normal.       ASSESSMENT/PLAN:  Encounter Diagnoses   Name Primary? Left hip pain Yes    Trochanteric bursitis of left hip     Generalized joint pain      Orders Placed This Encounter   Medications    oxaprozin (DAYPRO) 600 MG tablet     Sig: Take 1 tablet by mouth 2 times daily     Dispense:  180 tablet     Refill:  1    predniSONE (DELTASONE) 20 MG tablet     Si bid for 5 days, 1 qd for 5 days     Dispense:  15 tablet     Refill:  0    triamcinolone acetonide (KENALOG-40) injection 40 mg     Orders Placed This Encounter   Procedures    [place] ARTHROCENTESIS ASPIR&/INJ MAJOR JT/BURSA W/O US     Procedure Note    PREOP DIAGNOSIS:  [] Right [x]  Left    [] Bilateral Hip Pain    POSTOP DIAGNOSIS:  [] Right [x]  Left    [] Bilateral Hip Pain    OPERATION:  [] Right [x]  Left    [] Bilateral Greater trochanter injection      PROCEDURE:  After sterile prep, 40 mg of Kenalog and 2 mL of 1% Xylocaine without epinephrine was injected into the bursa without incident. Pre- and post neurovascular status verified. No complications noted. Informed consent and time out was completed prior to procedure    Did give her a course of oral steroid that can take if she has residual joint and musculoskeletal issues after full effect of the injection performed today. We will have her stop her Arthrotec and did give her prescription for Daypro to try instead. Patient is to return to my office as scheduled for her routine medical follow-up visit or sooner if any further problems or symptoms arise.     (Please note that portions of this note were completed with a voice recognition program. Efforts were made to edit

## 2023-06-23 ENCOUNTER — TELEMEDICINE (OUTPATIENT)
Dept: FAMILY MEDICINE CLINIC | Age: 73
End: 2023-06-23
Payer: MEDICARE

## 2023-06-23 DIAGNOSIS — Z00.00 MEDICARE ANNUAL WELLNESS VISIT, SUBSEQUENT: Primary | ICD-10-CM

## 2023-06-23 PROCEDURE — G0439 PPPS, SUBSEQ VISIT: HCPCS | Performed by: FAMILY MEDICINE

## 2023-06-23 PROCEDURE — 3017F COLORECTAL CA SCREEN DOC REV: CPT | Performed by: FAMILY MEDICINE

## 2023-06-23 PROCEDURE — 1123F ACP DISCUSS/DSCN MKR DOCD: CPT | Performed by: FAMILY MEDICINE

## 2023-06-23 ASSESSMENT — PATIENT HEALTH QUESTIONNAIRE - PHQ9
2. FEELING DOWN, DEPRESSED OR HOPELESS: 0
SUM OF ALL RESPONSES TO PHQ QUESTIONS 1-9: 0
1. LITTLE INTEREST OR PLEASURE IN DOING THINGS: 0
SUM OF ALL RESPONSES TO PHQ QUESTIONS 1-9: 0
SUM OF ALL RESPONSES TO PHQ QUESTIONS 1-9: 0
SUM OF ALL RESPONSES TO PHQ9 QUESTIONS 1 & 2: 0
SUM OF ALL RESPONSES TO PHQ QUESTIONS 1-9: 0

## 2023-06-23 ASSESSMENT — LIFESTYLE VARIABLES
HOW OFTEN DO YOU HAVE A DRINK CONTAINING ALCOHOL: NEVER
HOW MANY STANDARD DRINKS CONTAINING ALCOHOL DO YOU HAVE ON A TYPICAL DAY: PATIENT DOES NOT DRINK

## 2023-06-23 NOTE — PROGRESS NOTES
Medicare Annual Wellness Visit    Chanda Torres is here for Medicare AWV (Subsequent; last 2/23/2022)    Assessment & Plan     Recommendations for Preventive Services Due: see orders and patient instructions/AVS.  Recommended screening schedule for the next 5-10 years is provided to the patient in written form: see Patient Instructions/AVS.     No follow-ups on file. Subjective       Patient's complete Health Risk Assessment and screening values have been reviewed and are found in Flowsheets. The following problems were reviewed today and where indicated follow up appointments were made and/or referrals ordered. 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 (!) Abnormal      Inactivity Interventions:  See AVS for additional education material  Obesity Interventions:  See AVS for additional education material             Advanced Directives:  Do you have a Living Will?: (!) No (discussed purpose and avenues to complete if interested)    Intervention:  has NO advanced directive - information provided             Objective      Patient-Reported Vitals  Patient-Reported Weight: 214lb  Patient-Reported Height: 5'3\"            Allergies   Allergen Reactions    Simvastatin Other (See Comments)     Muscle cramps in legs     Prior to Visit Medications    Medication Sig Taking?  Authorizing Provider   oxaprozin (DAYPRO) 600 MG tablet Take 1 tablet by mouth 2 times

## 2023-06-23 NOTE — PATIENT INSTRUCTIONS
cholesterol actually carries cholesterol away from your arteries and may, therefore, help lower your risk of having a heart attack. You want this level to be high (ideally greater than 60). It is a risk to have a level less than 40. You can raise this good cholesterol by eating olive oil, canola oil, avocados, or nuts. Exercise raises this level, too. Fat    Fat is calorie dense and packs a lot of calories into a small amount of food. Even though fats should be limited due to their high calorie content, not all fats are bad. In fact, some fats are quite healthful. Fat can be broken down into four main types. The good-for-you fats are:   Monounsaturated fat  found in oils such as olive and canola, avocados, and nuts and natural nut butters; can decrease cholesterol levels, while keeping levels of HDL cholesterol high   Polyunsaturated fat  found in oils such as safflower, sunflower, soybean, corn, and sesame; can decrease total cholesterol and LDL cholesterol   Omega-3 fatty acids  particularly those found in fatty fish (such as salmon, trout, tuna, mackerel, herring, and sardines); can decrease risk of arrhythmias, decrease triglyceride levels, and slightly lower blood pressure   The fats that you want to limit are:   Saturated fat  found in animal products, many fast foods, and a few vegetables; increases total blood cholesterol, including LDL levels   Animal fats that are saturated include: butter, lard, whole-milk dairy products, meat fat, and poultry skin   Vegetable fats that are saturated include: hydrogenated shortening, palm oil, coconut oil, cocoa butter   Hydrogenated or trans fat  found in margarine and vegetable shortening, most shelf stable snack foods, and fried foods; increases LDL and decreases HDL     It is generally recommended that you limit your total fat for the day to less than 30% of your total calories.  If you follow an 1800-calorie heart healthy diet, for example, this would mean 60

## 2023-06-28 ENCOUNTER — TELEPHONE (OUTPATIENT)
Dept: FAMILY MEDICINE CLINIC | Age: 73
End: 2023-06-28

## 2023-06-28 RX ORDER — MELOXICAM 15 MG/1
15 TABLET ORAL DAILY
Qty: 90 TABLET | Refills: 1 | Status: SHIPPED | OUTPATIENT
Start: 2023-06-28

## 2023-06-28 RX ORDER — HYDROCHLOROTHIAZIDE 25 MG/1
25 TABLET ORAL EVERY MORNING
Qty: 90 TABLET | Refills: 1 | Status: SHIPPED | OUTPATIENT
Start: 2023-06-28

## 2023-07-04 DIAGNOSIS — I10 ESSENTIAL HYPERTENSION: ICD-10-CM

## 2023-07-05 RX ORDER — METOPROLOL TARTRATE 50 MG/1
TABLET, FILM COATED ORAL
Qty: 90 TABLET | Refills: 3 | Status: SHIPPED | OUTPATIENT
Start: 2023-07-05

## 2023-07-05 RX ORDER — ROSUVASTATIN CALCIUM 20 MG/1
TABLET, COATED ORAL
Qty: 90 TABLET | Refills: 3 | Status: SHIPPED | OUTPATIENT
Start: 2023-07-05

## 2023-07-05 RX ORDER — DICLOFENAC SODIUM AND MISOPROSTOL 75; 200 MG/1; UG/1
TABLET, DELAYED RELEASE ORAL
Qty: 90 TABLET | Refills: 3 | Status: SHIPPED | OUTPATIENT
Start: 2023-07-05

## 2023-07-05 NOTE — TELEPHONE ENCOUNTER
Ronald Bahena is requesting a refill on the following medication(s):  Requested Prescriptions     Pending Prescriptions Disp Refills    metoprolol tartrate (LOPRESSOR) 50 MG tablet [Pharmacy Med Name: Metoprolol Tartrate 50 MG Oral Tablet] 90 tablet 3     Sig: TAKE 1 TABLET DAILY    rosuvastatin (CRESTOR) 20 MG tablet [Pharmacy Med Name: Rosuvastatin Calcium 20 MG Oral Tablet] 90 tablet 3     Sig: TAKE 1 TABLET AT BEDTIME    diclofenac-miSOPROStol (ARTHROTEC 75) 75-0.2 MG per tablet [Pharmacy Med Name: Guadlupe Prow 75-0.2 MG Oral Tablet Delayed Release] 90 tablet 3     Sig: TAKE 1 TABLET BY MOUTH DAILY       Last Visit Date (If Applicable):  7/23/1921    Next Visit Date:    9/6/2023

## 2023-08-01 ENCOUNTER — OFFICE VISIT (OUTPATIENT)
Dept: PRIMARY CARE CLINIC | Age: 73
End: 2023-08-01
Payer: MEDICARE

## 2023-08-01 VITALS
HEIGHT: 63 IN | TEMPERATURE: 97.8 F | DIASTOLIC BLOOD PRESSURE: 86 MMHG | HEART RATE: 78 BPM | BODY MASS INDEX: 38.7 KG/M2 | SYSTOLIC BLOOD PRESSURE: 134 MMHG | OXYGEN SATURATION: 98 % | RESPIRATION RATE: 18 BRPM | WEIGHT: 218.38 LBS

## 2023-08-01 DIAGNOSIS — B02.9 HERPES ZOSTER WITHOUT COMPLICATION: Primary | ICD-10-CM

## 2023-08-01 PROCEDURE — 3075F SYST BP GE 130 - 139MM HG: CPT

## 2023-08-01 PROCEDURE — 99212 OFFICE O/P EST SF 10 MIN: CPT

## 2023-08-01 PROCEDURE — 3079F DIAST BP 80-89 MM HG: CPT

## 2023-08-01 PROCEDURE — 1123F ACP DISCUSS/DSCN MKR DOCD: CPT

## 2023-08-01 PROCEDURE — 99213 OFFICE O/P EST LOW 20 MIN: CPT

## 2023-08-01 RX ORDER — VALACYCLOVIR HYDROCHLORIDE 1 G/1
1000 TABLET, FILM COATED ORAL 2 TIMES DAILY
Qty: 20 TABLET | Refills: 0 | Status: SHIPPED | OUTPATIENT
Start: 2023-08-01 | End: 2023-08-11

## 2023-08-01 ASSESSMENT — ENCOUNTER SYMPTOMS
RESPIRATORY NEGATIVE: 1
SHORTNESS OF BREATH: 0
VOICE CHANGE: 0
ALLERGIC/IMMUNOLOGIC NEGATIVE: 1
VOMITING: 0
SORE THROAT: 0
SINUS PRESSURE: 0
COUGH: 0
SINUS PAIN: 0
RHINORRHEA: 0
DIARRHEA: 1

## 2023-08-01 NOTE — PROGRESS NOTES
OTHER SURGICAL HISTORY  07/12/2005    Had intraductal papilloma of the right breast excised. OVARY REMOVAL         Family History   Problem Relation Age of Onset    Heart Disease Father     Heart Disease Maternal Grandfather     High Blood Pressure Maternal Grandfather     Stroke Maternal Grandfather     Heart Disease Maternal Grandmother     High Blood Pressure Maternal Grandmother     Stroke Maternal Grandmother     High Blood Pressure Mother     Thyroid Disease Mother     Cancer Mother         Neck    High Blood Pressure Other     Cancer Child         Clavicle. Social History     Tobacco Use    Smoking status: Never    Smokeless tobacco: Never   Substance Use Topics    Alcohol use: Yes     Comment: Rare alcohol use. Prior to Visit Medications    Medication Sig Taking?  Authorizing Provider   metoprolol tartrate (LOPRESSOR) 50 MG tablet TAKE 1 TABLET DAILY Yes Jeb Bell DO   rosuvastatin (CRESTOR) 20 MG tablet TAKE 1 TABLET AT BEDTIME Yes Desirae Calhoun DO   hydroCHLOROthiazide (HYDRODIURIL) 25 MG tablet Take 1 tablet by mouth every morning Yes Jeb Bell DO   meloxicam (MOBIC) 15 MG tablet Take 1 tablet by mouth daily Yes Jeb Bell DO   oxaprozin (DAYPRO) 600 MG tablet Take 1 tablet by mouth 2 times daily Yes Jeb Bell DO   lisinopril (PRINIVIL;ZESTRIL) 20 MG tablet TAKE 1 TABLET DAILY Yes Desirae Calhoun DO   amLODIPine (NORVASC) 5 MG tablet TAKE 1 TABLET DAILY Yes Desirae Calhoun DO   Omega-3 Fatty Acids (FISH OIL PO) Take by mouth daily Yes Historical Provider, MD   Multiple Vitamins-Minerals (PRESERVISION AREDS 2 PO) Take 2 tablets by mouth daily Yes Historical Provider, MD   diclofenac-miSOPROStol (ARTHROTEC 75) 75-0.2 MG per tablet TAKE 1 TABLET BY MOUTH DAILY  Patient not taking: Reported on 8/1/2023  Jeb Bell DO   Respiratory Therapy Supplies MISC [employer] supplies  Jeb Bell DO   SUMAtriptan (IMITREX) 50 MG tablet Take 1 tablet by

## 2023-08-22 ENCOUNTER — OFFICE VISIT (OUTPATIENT)
Dept: FAMILY MEDICINE CLINIC | Age: 73
End: 2023-08-22
Payer: MEDICARE

## 2023-08-22 VITALS
HEIGHT: 63 IN | DIASTOLIC BLOOD PRESSURE: 76 MMHG | SYSTOLIC BLOOD PRESSURE: 130 MMHG | BODY MASS INDEX: 37.56 KG/M2 | OXYGEN SATURATION: 95 % | TEMPERATURE: 97.6 F | RESPIRATION RATE: 18 BRPM | HEART RATE: 60 BPM | WEIGHT: 212 LBS

## 2023-08-22 DIAGNOSIS — M70.62 TROCHANTERIC BURSITIS OF LEFT HIP: ICD-10-CM

## 2023-08-22 DIAGNOSIS — M70.61 TROCHANTERIC BURSITIS, RIGHT HIP: ICD-10-CM

## 2023-08-22 DIAGNOSIS — M25.551 RIGHT HIP PAIN: ICD-10-CM

## 2023-08-22 DIAGNOSIS — M25.552 LEFT HIP PAIN: Primary | ICD-10-CM

## 2023-08-22 PROCEDURE — 20610 DRAIN/INJ JOINT/BURSA W/O US: CPT | Performed by: FAMILY MEDICINE

## 2023-08-22 PROCEDURE — 99213 OFFICE O/P EST LOW 20 MIN: CPT | Performed by: FAMILY MEDICINE

## 2023-08-22 RX ORDER — TRIAMCINOLONE ACETONIDE 40 MG/ML
40 INJECTION, SUSPENSION INTRA-ARTICULAR; INTRAMUSCULAR ONCE
Status: COMPLETED | OUTPATIENT
Start: 2023-08-22 | End: 2023-08-22

## 2023-08-22 RX ADMIN — TRIAMCINOLONE ACETONIDE 40 MG: 40 INJECTION, SUSPENSION INTRA-ARTICULAR; INTRAMUSCULAR at 16:39

## 2023-08-22 RX ADMIN — TRIAMCINOLONE ACETONIDE 40 MG: 40 INJECTION, SUSPENSION INTRA-ARTICULAR; INTRAMUSCULAR at 16:40

## 2023-08-22 NOTE — PROGRESS NOTES
ASSESSMENT/PLAN:  Encounter Diagnoses   Name Primary? Left hip pain Yes    Trochanteric bursitis of left hip     Right hip pain     Trochanteric bursitis, right hip      Orders Placed This Encounter   Medications    triamcinolone acetonide (KENALOG-40) injection 40 mg    triamcinolone acetonide (KENALOG-40) injection 40 mg     Orders Placed This Encounter   Procedures    [place] ARTHROCENTESIS ASPIR&/INJ MAJOR JT/BURSA W/O US    [place] ARTHROCENTESIS ASPIR&/INJ MAJOR JT/BURSA W/O US     Procedure Note    PREOP DIAGNOSIS:  [] Right []  Left    [x] Bilateral Hip Pain    POSTOP DIAGNOSIS:  [] Right []  Left    [x] Bilateral Hip Pain    OPERATION:  [] Right []  Left    [x] Bilateral Greater trochanter injection      PROCEDURE:  After sterile prep, 40 mg of Kenalog and 2 mL of 1% Xylocaine without epinephrine was injected into the bursa without incident. Pre- and post neurovascular status verified. No complications noted. Informed consent and time out was completed prior to procedure    Continue with ice as needed, tylenol and NSAIDs as needed for pain. Try to avoid laying directly on the hips. Can perhaps use a body pillow to reposition body to lay a little more angled to take pressure off of the hip bursa. Tylenol/Motrin prn    Return  if no improvement in symptoms or if any further symptoms arise. No follow-ups on file. An electronic signature was used to authenticate this note.     --Agueda Dale DO on 8/22/2023 at 7:27 AM

## 2023-08-27 ASSESSMENT — ENCOUNTER SYMPTOMS: BACK PAIN: 0

## 2023-09-05 ENCOUNTER — HOSPITAL ENCOUNTER (OUTPATIENT)
Age: 73
Discharge: HOME | End: 2023-09-05
Payer: MEDICARE

## 2023-09-05 DIAGNOSIS — R73.01 IMPAIRED FASTING BLOOD SUGAR: ICD-10-CM

## 2023-09-05 DIAGNOSIS — E78.2 MIXED HYPERLIPIDEMIA: ICD-10-CM

## 2023-09-05 DIAGNOSIS — I10 ESSENTIAL HYPERTENSION: ICD-10-CM

## 2023-09-05 LAB
ALBUMIN SERPL-MCNC: 4.3 G/DL (ref 3.5–5.2)
ALBUMIN/GLOB SERPL: 1.7 {RATIO} (ref 1–2.5)
ALP SERPL-CCNC: 75 U/L (ref 35–104)
ALT SERPL-CCNC: 26 U/L (ref 5–33)
ANION GAP SERPL CALCULATED.3IONS-SCNC: 9 MMOL/L (ref 9–17)
AST SERPL-CCNC: 18 U/L
BASOPHILS # BLD: <0.03 K/UL (ref 0–0.2)
BASOPHILS NFR BLD: 0 % (ref 0–2)
BILIRUB SERPL-MCNC: 0.5 MG/DL (ref 0.3–1.2)
BUN SERPL-MCNC: 18 MG/DL (ref 8–23)
BUN/CREAT SERPL: 23 (ref 9–20)
CALCIUM SERPL-MCNC: 10 MG/DL (ref 8.6–10.4)
CHLORIDE SERPL-SCNC: 106 MMOL/L (ref 98–107)
CHOLEST SERPL-MCNC: 145 MG/DL
CHOLESTEROL/HDL RATIO: 3.7
CO2 SERPL-SCNC: 27 MMOL/L (ref 20–31)
CREAT SERPL-MCNC: 0.8 MG/DL (ref 0.5–0.9)
EOSINOPHIL # BLD: 0.05 K/UL (ref 0–0.44)
EOSINOPHILS RELATIVE PERCENT: 1 % (ref 1–4)
ERYTHROCYTE [DISTWIDTH] IN BLOOD BY AUTOMATED COUNT: 13.4 % (ref 11.8–14.4)
GFR SERPL CREATININE-BSD FRML MDRD: >60 ML/MIN/1.73M2
GLUCOSE SERPL-MCNC: 103 MG/DL (ref 70–99)
HCT VFR BLD AUTO: 48.3 % (ref 36.3–47.1)
HDLC SERPL-MCNC: 39 MG/DL
HGB BLD-MCNC: 15.8 G/DL (ref 11.9–15.1)
IMM GRANULOCYTES # BLD AUTO: <0.03 K/UL (ref 0–0.3)
IMM GRANULOCYTES NFR BLD: 0 %
LDLC SERPL CALC-MCNC: 81 MG/DL (ref 0–130)
LYMPHOCYTES NFR BLD: 0.94 K/UL (ref 1.1–3.7)
LYMPHOCYTES RELATIVE PERCENT: 21 % (ref 24–43)
MCH RBC QN AUTO: 29.9 PG (ref 25.2–33.5)
MCHC RBC AUTO-ENTMCNC: 32.7 G/DL (ref 25.2–33.5)
MCV RBC AUTO: 91.3 FL (ref 82.6–102.9)
MONOCYTES NFR BLD: 0.41 K/UL (ref 0.1–1.2)
MONOCYTES NFR BLD: 9 % (ref 3–12)
NEUTROPHILS NFR BLD: 69 % (ref 36–65)
NEUTS SEG NFR BLD: 3.13 K/UL (ref 1.5–8.1)
NRBC BLD-RTO: 0 PER 100 WBC
PLATELET # BLD AUTO: ABNORMAL K/UL (ref 138–453)
PLATELET, FLUORESCENCE: 139 K/UL (ref 138–453)
PLATELETS.RETICULATED NFR BLD AUTO: 6.9 % (ref 1.1–10.3)
POTASSIUM SERPL-SCNC: 4.7 MMOL/L (ref 3.7–5.3)
PROT SERPL-MCNC: 6.8 G/DL (ref 6.4–8.3)
RBC # BLD AUTO: 5.29 M/UL (ref 3.95–5.11)
SODIUM SERPL-SCNC: 142 MMOL/L (ref 135–144)
TRIGL SERPL-MCNC: 123 MG/DL
WBC OTHER # BLD: 4.6 K/UL (ref 3.5–11.3)

## 2023-09-05 PROCEDURE — 85025 COMPLETE CBC W/AUTO DIFF WBC: CPT

## 2023-09-05 PROCEDURE — 36415 COLL VENOUS BLD VENIPUNCTURE: CPT

## 2023-09-05 PROCEDURE — 80053 COMPREHEN METABOLIC PANEL: CPT

## 2023-09-05 PROCEDURE — 83036 HEMOGLOBIN GLYCOSYLATED A1C: CPT

## 2023-09-05 PROCEDURE — 80061 LIPID PANEL: CPT

## 2023-09-06 ENCOUNTER — OFFICE VISIT (OUTPATIENT)
Dept: FAMILY MEDICINE CLINIC | Age: 73
End: 2023-09-06
Payer: MEDICARE

## 2023-09-06 VITALS
HEIGHT: 63 IN | BODY MASS INDEX: 36.5 KG/M2 | OXYGEN SATURATION: 96 % | TEMPERATURE: 97.8 F | RESPIRATION RATE: 18 BRPM | SYSTOLIC BLOOD PRESSURE: 120 MMHG | WEIGHT: 206 LBS | DIASTOLIC BLOOD PRESSURE: 80 MMHG | HEART RATE: 68 BPM

## 2023-09-06 DIAGNOSIS — R73.01 IMPAIRED FASTING BLOOD SUGAR: ICD-10-CM

## 2023-09-06 DIAGNOSIS — G47.33 OSA (OBSTRUCTIVE SLEEP APNEA): ICD-10-CM

## 2023-09-06 DIAGNOSIS — E78.2 MIXED HYPERLIPIDEMIA: ICD-10-CM

## 2023-09-06 DIAGNOSIS — D69.6 THROMBOCYTOPENIA (HCC): ICD-10-CM

## 2023-09-06 DIAGNOSIS — I10 ESSENTIAL HYPERTENSION: Primary | ICD-10-CM

## 2023-09-06 DIAGNOSIS — Z12.31 ENCOUNTER FOR SCREENING MAMMOGRAM FOR MALIGNANT NEOPLASM OF BREAST: ICD-10-CM

## 2023-09-06 LAB
EST. AVERAGE GLUCOSE BLD GHB EST-MCNC: 114 MG/DL
HBA1C MFR BLD: 5.6 % (ref 4–6)

## 2023-09-06 PROCEDURE — 1123F ACP DISCUSS/DSCN MKR DOCD: CPT | Performed by: FAMILY MEDICINE

## 2023-09-06 PROCEDURE — 1090F PRES/ABSN URINE INCON ASSESS: CPT | Performed by: FAMILY MEDICINE

## 2023-09-06 PROCEDURE — 99214 OFFICE O/P EST MOD 30 MIN: CPT | Performed by: FAMILY MEDICINE

## 2023-09-06 PROCEDURE — G8427 DOCREV CUR MEDS BY ELIG CLIN: HCPCS | Performed by: FAMILY MEDICINE

## 2023-09-06 PROCEDURE — 3079F DIAST BP 80-89 MM HG: CPT | Performed by: FAMILY MEDICINE

## 2023-09-06 PROCEDURE — G8417 CALC BMI ABV UP PARAM F/U: HCPCS | Performed by: FAMILY MEDICINE

## 2023-09-06 PROCEDURE — 3017F COLORECTAL CA SCREEN DOC REV: CPT | Performed by: FAMILY MEDICINE

## 2023-09-06 PROCEDURE — 99213 OFFICE O/P EST LOW 20 MIN: CPT | Performed by: FAMILY MEDICINE

## 2023-09-06 PROCEDURE — 1036F TOBACCO NON-USER: CPT | Performed by: FAMILY MEDICINE

## 2023-09-06 PROCEDURE — 3074F SYST BP LT 130 MM HG: CPT | Performed by: FAMILY MEDICINE

## 2023-09-06 PROCEDURE — G8400 PT W/DXA NO RESULTS DOC: HCPCS | Performed by: FAMILY MEDICINE

## 2023-09-06 ASSESSMENT — ENCOUNTER SYMPTOMS
EYE DISCHARGE: 0
VOMITING: 0
SHORTNESS OF BREATH: 0
SORE THROAT: 0
ABDOMINAL PAIN: 0
SINUS PRESSURE: 0
NAUSEA: 0
TROUBLE SWALLOWING: 0
WHEEZING: 0
COUGH: 0
EYE REDNESS: 0
CONSTIPATION: 0
RHINORRHEA: 0
DIARRHEA: 0

## 2023-09-06 NOTE — PATIENT INSTRUCTIONS
Hospital Outpatient Visit on 09/05/2023   Component Date Value Ref Range Status    Cholesterol 09/05/2023 145  <200 mg/dL Final    Comment:    Cholesterol Guidelines:      <200  Desirable   200-240  Borderline      >240  Undesirable         HDL 09/05/2023 39 (L)  >40 mg/dL Final    Comment:    HDL Guidelines:    <40     Undesirable   40-59    Borderline    >59     Desirable         LDL Cholesterol 09/05/2023 81  0 - 130 mg/dL Final    Comment:    LDL Guidelines:     <100    Desirable   100-129   Near to/above Desirable   130-159   Borderline      >159   Undesirable     Direct (measured) LDL and calculated LDL are not interchangeable tests. Chol/HDL Ratio 09/05/2023 3.7  <5 Final            Triglycerides 09/05/2023 123  <150 mg/dL Final    Comment:    Triglyceride Guidelines:     <150   Desirable   150-199  Borderline   200-499  High     >499   Very high   Based on AHA Guidelines for fasting triglyceride, October 2012.          WBC 09/05/2023 4.6  3.5 - 11.3 k/uL Final    RBC 09/05/2023 5.29 (H)  3.95 - 5.11 m/uL Final    Hemoglobin 09/05/2023 15.8 (H)  11.9 - 15.1 g/dL Final    Hematocrit 09/05/2023 48.3 (H)  36.3 - 47.1 % Final    MCV 09/05/2023 91.3  82.6 - 102.9 fL Final    MCH 09/05/2023 29.9  25.2 - 33.5 pg Final    MCHC 09/05/2023 32.7  25.2 - 33.5 g/dL Final    RDW 09/05/2023 13.4  11.8 - 14.4 % Final    Platelets 61/56/7099 See Reflexed IPF Result  138 - 453 k/uL Final    Platelet, Fluorescence 09/05/2023 139  138 - 453 k/uL Final    Platelet, Immature Fraction 09/05/2023 6.9  1.1 - 10.3 % Final    NRBC Automated 09/05/2023 0.0  0.0 per 100 WBC Final    Neutrophils % 09/05/2023 69 (H)  36 - 65 % Final    Lymphocytes % 09/05/2023 21 (L)  24 - 43 % Final    Monocytes % 09/05/2023 9  3 - 12 % Final    Eosinophils % 09/05/2023 1  1 - 4 % Final    Basophils % 09/05/2023 0  0 - 2 % Final    Immature Granulocytes 09/05/2023 0  0 % Final    Neutrophils Absolute 09/05/2023 3.13  1.50 - 8.10 k/uL Final

## 2023-11-06 ENCOUNTER — HOSPITAL ENCOUNTER (OUTPATIENT)
Dept: MAMMOGRAPHY | Age: 73
Discharge: HOME | End: 2023-11-08
Attending: FAMILY MEDICINE
Payer: MEDICARE

## 2023-11-06 VITALS — WEIGHT: 197 LBS | BODY MASS INDEX: 34.9 KG/M2

## 2023-11-06 DIAGNOSIS — Z12.31 ENCOUNTER FOR SCREENING MAMMOGRAM FOR MALIGNANT NEOPLASM OF BREAST: ICD-10-CM

## 2023-11-06 PROCEDURE — 77063 BREAST TOMOSYNTHESIS BI: CPT

## 2023-11-28 ENCOUNTER — OFFICE VISIT (OUTPATIENT)
Dept: PRIMARY CARE CLINIC | Age: 73
End: 2023-11-28
Payer: MEDICARE

## 2023-11-28 VITALS
SYSTOLIC BLOOD PRESSURE: 176 MMHG | HEART RATE: 58 BPM | OXYGEN SATURATION: 97 % | TEMPERATURE: 98.1 F | WEIGHT: 205 LBS | DIASTOLIC BLOOD PRESSURE: 92 MMHG | BODY MASS INDEX: 36.32 KG/M2 | HEIGHT: 63 IN | RESPIRATION RATE: 16 BRPM

## 2023-11-28 DIAGNOSIS — M70.61 TROCHANTERIC BURSITIS OF RIGHT HIP: ICD-10-CM

## 2023-11-28 DIAGNOSIS — M25.552 BILATERAL HIP PAIN: Primary | ICD-10-CM

## 2023-11-28 DIAGNOSIS — M70.62 TROCHANTERIC BURSITIS OF LEFT HIP: ICD-10-CM

## 2023-11-28 DIAGNOSIS — M25.551 BILATERAL HIP PAIN: Primary | ICD-10-CM

## 2023-11-28 PROCEDURE — 20610 DRAIN/INJ JOINT/BURSA W/O US: CPT | Performed by: FAMILY MEDICINE

## 2023-11-28 PROCEDURE — 99213 OFFICE O/P EST LOW 20 MIN: CPT | Performed by: FAMILY MEDICINE

## 2023-11-28 PROCEDURE — 3017F COLORECTAL CA SCREEN DOC REV: CPT | Performed by: FAMILY MEDICINE

## 2023-11-28 PROCEDURE — 1090F PRES/ABSN URINE INCON ASSESS: CPT | Performed by: FAMILY MEDICINE

## 2023-11-28 PROCEDURE — 3077F SYST BP >= 140 MM HG: CPT | Performed by: FAMILY MEDICINE

## 2023-11-28 PROCEDURE — G8484 FLU IMMUNIZE NO ADMIN: HCPCS | Performed by: FAMILY MEDICINE

## 2023-11-28 PROCEDURE — 99212 OFFICE O/P EST SF 10 MIN: CPT | Performed by: FAMILY MEDICINE

## 2023-11-28 PROCEDURE — G8400 PT W/DXA NO RESULTS DOC: HCPCS | Performed by: FAMILY MEDICINE

## 2023-11-28 PROCEDURE — G8427 DOCREV CUR MEDS BY ELIG CLIN: HCPCS | Performed by: FAMILY MEDICINE

## 2023-11-28 PROCEDURE — PBSHW PBB SHADOW CHARGE: Performed by: FAMILY MEDICINE

## 2023-11-28 PROCEDURE — 1036F TOBACCO NON-USER: CPT | Performed by: FAMILY MEDICINE

## 2023-11-28 PROCEDURE — 1123F ACP DISCUSS/DSCN MKR DOCD: CPT | Performed by: FAMILY MEDICINE

## 2023-11-28 PROCEDURE — G8417 CALC BMI ABV UP PARAM F/U: HCPCS | Performed by: FAMILY MEDICINE

## 2023-11-28 PROCEDURE — 3080F DIAST BP >= 90 MM HG: CPT | Performed by: FAMILY MEDICINE

## 2023-11-28 RX ORDER — TRIAMCINOLONE ACETONIDE 40 MG/ML
40 INJECTION, SUSPENSION INTRA-ARTICULAR; INTRAMUSCULAR ONCE
Status: COMPLETED | OUTPATIENT
Start: 2023-11-28 | End: 2023-11-28

## 2023-11-28 RX ADMIN — TRIAMCINOLONE ACETONIDE 40 MG: 40 INJECTION, SUSPENSION INTRA-ARTICULAR; INTRAMUSCULAR at 18:27

## 2023-11-28 RX ADMIN — TRIAMCINOLONE ACETONIDE 40 MG: 40 INJECTION, SUSPENSION INTRA-ARTICULAR; INTRAMUSCULAR at 18:28

## 2023-12-28 RX ORDER — AMLODIPINE BESYLATE 5 MG/1
5 TABLET ORAL 2 TIMES DAILY
Qty: 60 TABLET | Refills: 0 | Status: SHIPPED | OUTPATIENT
Start: 2023-12-28

## 2023-12-28 NOTE — TELEPHONE ENCOUNTER
Jessi Davis called requesting a refill of the below medication which has been pended for you:     Requested Prescriptions     Pending Prescriptions Disp Refills    amLODIPine (NORVASC) 5 MG tablet 60 tablet 0     Sig: Take 1 tablet by mouth 2 times daily       Last Appointment Date: 12/20/2023  Next Appointment Date: 3/7/2024    Allergies   Allergen Reactions    Simvastatin Other (See Comments)     Muscle cramps in legs

## 2023-12-29 ENCOUNTER — HOSPITAL ENCOUNTER (OUTPATIENT)
Dept: ULTRASOUND IMAGING | Age: 73
End: 2023-12-29
Attending: FAMILY MEDICINE
Payer: MEDICARE

## 2023-12-29 DIAGNOSIS — E04.1 THYROID NODULE: ICD-10-CM

## 2023-12-29 PROCEDURE — 76536 US EXAM OF HEAD AND NECK: CPT

## 2024-01-31 RX ORDER — AMLODIPINE BESYLATE 5 MG/1
5 TABLET ORAL 2 TIMES DAILY
Qty: 180 TABLET | Refills: 1 | Status: SHIPPED | OUTPATIENT
Start: 2024-01-31

## 2024-01-31 NOTE — TELEPHONE ENCOUNTER
Terra called requesting a refill of the below medication which has been pended for you:     Requested Prescriptions     Pending Prescriptions Disp Refills    amLODIPine (NORVASC) 5 MG tablet [Pharmacy Med Name: amLODIPine BESYLATE 5 MG TAB] 60 tablet 0     Sig: TAKE 1 TABLET BY MOUTH TWICE A DAY       Last Appointment Date: 9/6/2023  Next Appointment Date: 3/7/2024    Allergies   Allergen Reactions    Simvastatin Other (See Comments)     Muscle cramps in legs

## 2024-02-09 DIAGNOSIS — E04.1 THYROID NODULE: Primary | ICD-10-CM

## 2024-02-15 RX ORDER — LISINOPRIL 20 MG/1
20 TABLET ORAL DAILY
Qty: 90 TABLET | Refills: 3 | Status: SHIPPED | OUTPATIENT
Start: 2024-02-15

## 2024-02-15 NOTE — TELEPHONE ENCOUNTER
Terra called requesting a refill of the below medication which has been pended for you:     Requested Prescriptions     Pending Prescriptions Disp Refills    lisinopril (PRINIVIL;ZESTRIL) 20 MG tablet [Pharmacy Med Name: Lisinopril 20 MG Oral Tablet] 90 tablet 3     Sig: TAKE 1 TABLET BY MOUTH DAILY       Last Appointment Date: 12/20/2023  Next Appointment Date: 3/7/2024    Allergies   Allergen Reactions    Simvastatin Other (See Comments)     Muscle cramps in legs

## 2024-03-04 ENCOUNTER — HOSPITAL ENCOUNTER (OUTPATIENT)
Age: 74
Discharge: HOME | End: 2024-03-04
Payer: MEDICARE

## 2024-03-04 DIAGNOSIS — R73.01 IMPAIRED FASTING BLOOD SUGAR: ICD-10-CM

## 2024-03-04 DIAGNOSIS — I10 ESSENTIAL HYPERTENSION: ICD-10-CM

## 2024-03-04 DIAGNOSIS — E78.2 MIXED HYPERLIPIDEMIA: ICD-10-CM

## 2024-03-04 LAB
ALBUMIN SERPL-MCNC: 4.3 G/DL (ref 3.5–5.2)
ALBUMIN/GLOB SERPL: 1.6 {RATIO} (ref 1–2.5)
ALP SERPL-CCNC: 86 U/L (ref 35–104)
ALT SERPL-CCNC: 21 U/L (ref 5–33)
ANION GAP SERPL CALCULATED.3IONS-SCNC: 12 MMOL/L (ref 9–17)
AST SERPL-CCNC: 17 U/L
BASOPHILS # BLD: 0.03 K/UL (ref 0–0.2)
BASOPHILS NFR BLD: 1 % (ref 0–2)
BILIRUB SERPL-MCNC: 0.5 MG/DL (ref 0.3–1.2)
BUN SERPL-MCNC: 18 MG/DL (ref 8–23)
BUN/CREAT SERPL: 26 (ref 9–20)
CALCIUM SERPL-MCNC: 9.9 MG/DL (ref 8.6–10.4)
CHLORIDE SERPL-SCNC: 103 MMOL/L (ref 98–107)
CHOLEST SERPL-MCNC: 185 MG/DL
CHOLESTEROL/HDL RATIO: 4.9
CO2 SERPL-SCNC: 27 MMOL/L (ref 20–31)
CREAT SERPL-MCNC: 0.7 MG/DL (ref 0.5–0.9)
EOSINOPHIL # BLD: 0.06 K/UL (ref 0–0.44)
EOSINOPHILS RELATIVE PERCENT: 1 % (ref 1–4)
ERYTHROCYTE [DISTWIDTH] IN BLOOD BY AUTOMATED COUNT: 13.2 % (ref 11.8–14.4)
EST. AVERAGE GLUCOSE BLD GHB EST-MCNC: 111 MG/DL
GFR SERPL CREATININE-BSD FRML MDRD: >60 ML/MIN/1.73M2
GLUCOSE SERPL-MCNC: 105 MG/DL (ref 70–99)
HBA1C MFR BLD: 5.5 % (ref 4–6)
HCT VFR BLD AUTO: 46.7 % (ref 36.3–47.1)
HDLC SERPL-MCNC: 38 MG/DL
HGB BLD-MCNC: 15.9 G/DL (ref 11.9–15.1)
IMM GRANULOCYTES # BLD AUTO: <0.03 K/UL (ref 0–0.3)
IMM GRANULOCYTES NFR BLD: 0 %
LDLC SERPL CALC-MCNC: 108 MG/DL (ref 0–130)
LYMPHOCYTES NFR BLD: 1.11 K/UL (ref 1.1–3.7)
LYMPHOCYTES RELATIVE PERCENT: 24 % (ref 24–43)
MCH RBC QN AUTO: 30.1 PG (ref 25.2–33.5)
MCHC RBC AUTO-ENTMCNC: 34 G/DL (ref 25.2–33.5)
MCV RBC AUTO: 88.4 FL (ref 82.6–102.9)
MONOCYTES NFR BLD: 0.51 K/UL (ref 0.1–1.2)
MONOCYTES NFR BLD: 11 % (ref 3–12)
NEUTROPHILS NFR BLD: 63 % (ref 36–65)
NEUTS SEG NFR BLD: 2.81 K/UL (ref 1.5–8.1)
NRBC BLD-RTO: 0 PER 100 WBC
PLATELET # BLD AUTO: 148 K/UL (ref 138–453)
PMV BLD AUTO: 10.6 FL (ref 8.1–13.5)
POTASSIUM SERPL-SCNC: 4 MMOL/L (ref 3.7–5.3)
PROT SERPL-MCNC: 7 G/DL (ref 6.4–8.3)
RBC # BLD AUTO: 5.28 M/UL (ref 3.95–5.11)
SODIUM SERPL-SCNC: 142 MMOL/L (ref 135–144)
TRIGL SERPL-MCNC: 197 MG/DL
WBC OTHER # BLD: 4.5 K/UL (ref 3.5–11.3)

## 2024-03-04 PROCEDURE — 80053 COMPREHEN METABOLIC PANEL: CPT

## 2024-03-04 PROCEDURE — 80061 LIPID PANEL: CPT

## 2024-03-04 PROCEDURE — 83036 HEMOGLOBIN GLYCOSYLATED A1C: CPT

## 2024-03-04 PROCEDURE — 36415 COLL VENOUS BLD VENIPUNCTURE: CPT

## 2024-03-04 PROCEDURE — 85025 COMPLETE CBC W/AUTO DIFF WBC: CPT

## 2024-03-04 NOTE — RESULT ENCOUNTER NOTE
Will review at upcoming appointment   [Well Nourished] : well nourished [Well Developed] : well developed [Well-Appearing] : well-appearing [Normal Sclera/Conjunctiva] : normal sclera/conjunctiva [PERRL] : pupils equal round and reactive to light [EOMI] : extraocular movements intact [Normal Outer Ear/Nose] : the outer ears and nose were normal in appearance [Normal Oropharynx] : the oropharynx was normal [Normal TMs] : both tympanic membranes were normal [No JVD] : no jugular venous distention [No Lymphadenopathy] : no lymphadenopathy [Supple] : supple [Thyroid Normal, No Nodules] : the thyroid was normal and there were no nodules present [No Respiratory Distress] : no respiratory distress  [No Accessory Muscle Use] : no accessory muscle use [Clear to Auscultation] : lungs were clear to auscultation bilaterally [Normal Rate] : normal rate  [Regular Rhythm] : with a regular rhythm [Normal S1, S2] : normal S1 and S2 [No Carotid Bruits] : no carotid bruits [No Abdominal Bruit] : a ~M bruit was not heard ~T in the abdomen [No Varicosities] : no varicosities [Pedal Pulses Present] : the pedal pulses are present [No Edema] : there was no peripheral edema [No Palpable Aorta] : no palpable aorta [No Extremity Clubbing/Cyanosis] : no extremity clubbing/cyanosis [Soft] : abdomen soft [Non Tender] : non-tender [Non-distended] : non-distended [No Masses] : no abdominal mass palpated [No HSM] : no HSM [Normal Bowel Sounds] : normal bowel sounds [Normal Posterior Cervical Nodes] : no posterior cervical lymphadenopathy [Normal Anterior Cervical Nodes] : no anterior cervical lymphadenopathy [No CVA Tenderness] : no CVA  tenderness [No Spinal Tenderness] : no spinal tenderness [No Joint Swelling] : no joint swelling [Grossly Normal Strength/Tone] : grossly normal strength/tone [No Rash] : no rash [Coordination Grossly Intact] : coordination grossly intact [No Focal Deficits] : no focal deficits [Normal Gait] : normal gait [Deep Tendon Reflexes (DTR)] : deep tendon reflexes were 2+ and symmetric [Speech Grossly Normal] : speech grossly normal [Normal Affect] : the affect was normal [Alert and Oriented x3] : oriented to person, place, and time [Normal Mood] : the mood was normal [Normal Insight/Judgement] : insight and judgment were intact [de-identified] : small .1mm nevi on lower lip [de-identified] : left thigh hematoma

## 2024-03-07 ENCOUNTER — OFFICE VISIT (OUTPATIENT)
Dept: FAMILY MEDICINE CLINIC | Age: 74
End: 2024-03-07
Payer: MEDICARE

## 2024-03-07 VITALS
WEIGHT: 204 LBS | TEMPERATURE: 98 F | HEART RATE: 84 BPM | SYSTOLIC BLOOD PRESSURE: 116 MMHG | DIASTOLIC BLOOD PRESSURE: 76 MMHG | HEIGHT: 63 IN | BODY MASS INDEX: 36.14 KG/M2 | OXYGEN SATURATION: 96 % | RESPIRATION RATE: 18 BRPM

## 2024-03-07 DIAGNOSIS — E78.2 MIXED HYPERLIPIDEMIA: ICD-10-CM

## 2024-03-07 DIAGNOSIS — E66.09 CLASS 2 OBESITY DUE TO EXCESS CALORIES WITH BODY MASS INDEX (BMI) OF 36.0 TO 36.9 IN ADULT, UNSPECIFIED WHETHER SERIOUS COMORBIDITY PRESENT: ICD-10-CM

## 2024-03-07 DIAGNOSIS — M25.552 LEFT HIP PAIN: ICD-10-CM

## 2024-03-07 DIAGNOSIS — I10 ESSENTIAL HYPERTENSION: Primary | ICD-10-CM

## 2024-03-07 DIAGNOSIS — G47.33 OSA (OBSTRUCTIVE SLEEP APNEA): ICD-10-CM

## 2024-03-07 DIAGNOSIS — R73.01 IMPAIRED FASTING BLOOD SUGAR: ICD-10-CM

## 2024-03-07 PROCEDURE — 1123F ACP DISCUSS/DSCN MKR DOCD: CPT | Performed by: FAMILY MEDICINE

## 2024-03-07 PROCEDURE — G8427 DOCREV CUR MEDS BY ELIG CLIN: HCPCS | Performed by: FAMILY MEDICINE

## 2024-03-07 PROCEDURE — 1090F PRES/ABSN URINE INCON ASSESS: CPT | Performed by: FAMILY MEDICINE

## 2024-03-07 PROCEDURE — 3078F DIAST BP <80 MM HG: CPT | Performed by: FAMILY MEDICINE

## 2024-03-07 PROCEDURE — G8417 CALC BMI ABV UP PARAM F/U: HCPCS | Performed by: FAMILY MEDICINE

## 2024-03-07 PROCEDURE — G8400 PT W/DXA NO RESULTS DOC: HCPCS | Performed by: FAMILY MEDICINE

## 2024-03-07 PROCEDURE — 3074F SYST BP LT 130 MM HG: CPT | Performed by: FAMILY MEDICINE

## 2024-03-07 PROCEDURE — 1036F TOBACCO NON-USER: CPT | Performed by: FAMILY MEDICINE

## 2024-03-07 PROCEDURE — G8484 FLU IMMUNIZE NO ADMIN: HCPCS | Performed by: FAMILY MEDICINE

## 2024-03-07 PROCEDURE — 99214 OFFICE O/P EST MOD 30 MIN: CPT | Performed by: FAMILY MEDICINE

## 2024-03-07 PROCEDURE — 3017F COLORECTAL CA SCREEN DOC REV: CPT | Performed by: FAMILY MEDICINE

## 2024-03-07 PROCEDURE — G2211 COMPLEX E/M VISIT ADD ON: HCPCS | Performed by: FAMILY MEDICINE

## 2024-03-07 RX ORDER — TRIAMCINOLONE ACETONIDE 40 MG/ML
40 INJECTION, SUSPENSION INTRA-ARTICULAR; INTRAMUSCULAR ONCE
Status: CANCELLED | OUTPATIENT
Start: 2024-03-07 | End: 2024-03-07

## 2024-03-07 RX ORDER — AMOXICILLIN 875 MG/1
875 TABLET, COATED ORAL 2 TIMES DAILY
Qty: 20 TABLET | Refills: 0 | Status: SHIPPED | OUTPATIENT
Start: 2024-03-07

## 2024-03-07 SDOH — ECONOMIC STABILITY: INCOME INSECURITY: HOW HARD IS IT FOR YOU TO PAY FOR THE VERY BASICS LIKE FOOD, HOUSING, MEDICAL CARE, AND HEATING?: NOT HARD AT ALL

## 2024-03-07 SDOH — ECONOMIC STABILITY: FOOD INSECURITY: WITHIN THE PAST 12 MONTHS, YOU WORRIED THAT YOUR FOOD WOULD RUN OUT BEFORE YOU GOT MONEY TO BUY MORE.: NEVER TRUE

## 2024-03-07 SDOH — ECONOMIC STABILITY: FOOD INSECURITY: WITHIN THE PAST 12 MONTHS, THE FOOD YOU BOUGHT JUST DIDN'T LAST AND YOU DIDN'T HAVE MONEY TO GET MORE.: NEVER TRUE

## 2024-03-07 ASSESSMENT — PATIENT HEALTH QUESTIONNAIRE - PHQ9
SUM OF ALL RESPONSES TO PHQ QUESTIONS 1-9: 0
2. FEELING DOWN, DEPRESSED OR HOPELESS: 0
1. LITTLE INTEREST OR PLEASURE IN DOING THINGS: 0
SUM OF ALL RESPONSES TO PHQ QUESTIONS 1-9: 0
SUM OF ALL RESPONSES TO PHQ9 QUESTIONS 1 & 2: 0
SUM OF ALL RESPONSES TO PHQ QUESTIONS 1-9: 0
SUM OF ALL RESPONSES TO PHQ QUESTIONS 1-9: 0

## 2024-03-07 NOTE — PATIENT INSTRUCTIONS
Hospital Outpatient Visit on 03/04/2024   Component Date Value Ref Range Status    WBC 03/04/2024 4.5  3.5 - 11.3 k/uL Final    RBC 03/04/2024 5.28 (H)  3.95 - 5.11 m/uL Final    Hemoglobin 03/04/2024 15.9 (H)  11.9 - 15.1 g/dL Final    Hematocrit 03/04/2024 46.7  36.3 - 47.1 % Final    MCV 03/04/2024 88.4  82.6 - 102.9 fL Final    MCH 03/04/2024 30.1  25.2 - 33.5 pg Final    MCHC 03/04/2024 34.0 (H)  25.2 - 33.5 g/dL Final    RDW 03/04/2024 13.2  11.8 - 14.4 % Final    Platelets 03/04/2024 148  138 - 453 k/uL Final    MPV 03/04/2024 10.6  8.1 - 13.5 fL Final    NRBC Automated 03/04/2024 0.0  0.0 per 100 WBC Final    Neutrophils % 03/04/2024 63  36 - 65 % Final    Lymphocytes % 03/04/2024 24  24 - 43 % Final    Monocytes % 03/04/2024 11  3 - 12 % Final    Eosinophils % 03/04/2024 1  1 - 4 % Final    Basophils % 03/04/2024 1  0 - 2 % Final    Immature Granulocytes 03/04/2024 0  0 % Final    Neutrophils Absolute 03/04/2024 2.81  1.50 - 8.10 k/uL Final    Lymphocytes Absolute 03/04/2024 1.11  1.10 - 3.70 k/uL Final    Monocytes Absolute 03/04/2024 0.51  0.10 - 1.20 k/uL Final    Eosinophils Absolute 03/04/2024 0.06  0.00 - 0.44 k/uL Final    Basophils Absolute 03/04/2024 0.03  0.00 - 0.20 k/uL Final    Absolute Immature Granulocyte 03/04/2024 <0.03  0.00 - 0.30 k/uL Final    Sodium 03/04/2024 142  135 - 144 mmol/L Final    Potassium 03/04/2024 4.0  3.7 - 5.3 mmol/L Final    Chloride 03/04/2024 103  98 - 107 mmol/L Final    CO2 03/04/2024 27  20 - 31 mmol/L Final    Anion Gap 03/04/2024 12  9 - 17 mmol/L Final    Glucose 03/04/2024 105 (H)  70 - 99 mg/dL Final    BUN 03/04/2024 18  8 - 23 mg/dL Final    Creatinine 03/04/2024 0.7  0.5 - 0.9 mg/dL Final    Est, Glom Filt Rate 03/04/2024 >60  >60 mL/min/1.73m2 Final    Comment:       These results are not intended for use in patients <18 years of age.        eGFR results are calculated without a race factor using the 2021 CKD-EPI equation.  Careful clinical correlation is

## 2024-03-07 NOTE — PROGRESS NOTES
Patient declines dexa and colon screenings at this time.  
Ear: Tympanic membrane, ear canal and external ear normal.      Left Ear: Tympanic membrane, ear canal and external ear normal.      Nose: Congestion and rhinorrhea present.      Comments: Pain with palpation to the right frontal and maxillary sinus region     Mouth/Throat:      Mouth: Mucous membranes are moist.      Pharynx: Oropharynx is clear. No oropharyngeal exudate.   Eyes:      General:         Right eye: No discharge.         Left eye: No discharge.      Conjunctiva/sclera: Conjunctivae normal.      Pupils: Pupils are equal, round, and reactive to light.   Neck:      Thyroid: No thyromegaly.   Cardiovascular:      Rate and Rhythm: Normal rate and regular rhythm.      Heart sounds: Normal heart sounds.   Pulmonary:      Effort: Pulmonary effort is normal.      Breath sounds: Normal breath sounds. No wheezing or rales.   Abdominal:      General: Bowel sounds are normal. There is no distension.      Palpations: Abdomen is soft.      Tenderness: There is no abdominal tenderness.   Musculoskeletal:         General: Tenderness present.      Cervical back: Normal range of motion and neck supple.      Comments: Reproducible pain with palpation to the lateral hips bilaterally over the trochanteric bursa   Lymphadenopathy:      Cervical: No cervical adenopathy.   Skin:     General: Skin is warm and dry.      Findings: No rash.   Neurological:      Mental Status: She is alert and oriented to person, place, and time.   Psychiatric:         Behavior: Behavior normal.         Thought Content: Thought content normal.         Judgment: Judgment normal.           ASSESSMENT/PLAN:    Encounter Diagnoses   Name Primary?    Essential hypertension Yes    Mixed hyperlipidemia     Impaired fasting blood sugar     DERIAN (obstructive sleep apnea)     Left hip pain     Class 2 obesity due to excess calories with body mass index (BMI) of 36.0 to 36.9 in adult, unspecified whether serious comorbidity present        Orders Placed This

## 2024-03-11 ASSESSMENT — ENCOUNTER SYMPTOMS
SINUS PRESSURE: 1
SINUS PAIN: 1

## 2024-03-13 ENCOUNTER — HOSPITAL ENCOUNTER (OUTPATIENT)
Dept: GENERAL RADIOLOGY | Age: 74
Discharge: HOME | End: 2024-03-15
Attending: FAMILY MEDICINE
Payer: MEDICARE

## 2024-03-13 DIAGNOSIS — M25.552 LEFT HIP PAIN: ICD-10-CM

## 2024-03-13 PROCEDURE — 6360000002 HC RX W HCPCS

## 2024-03-13 PROCEDURE — 6360000004 HC RX CONTRAST MEDICATION: Performed by: FAMILY MEDICINE

## 2024-03-13 PROCEDURE — 77002 NEEDLE LOCALIZATION BY XRAY: CPT

## 2024-03-13 PROCEDURE — 20610 DRAIN/INJ JOINT/BURSA W/O US: CPT

## 2024-03-13 PROCEDURE — 2500000003 HC RX 250 WO HCPCS

## 2024-03-13 RX ADMIN — IOHEXOL 5 ML: 240 INJECTION, SOLUTION INTRATHECAL; INTRAVASCULAR; INTRAVENOUS; ORAL at 11:08

## 2024-03-22 RX ORDER — MELOXICAM 15 MG/1
15 TABLET ORAL DAILY
Qty: 90 TABLET | Refills: 1 | Status: SHIPPED | OUTPATIENT
Start: 2024-03-22

## 2024-03-22 NOTE — TELEPHONE ENCOUNTER
Terra called requesting a refill of the below medication which has been pended for you:     Requested Prescriptions     Pending Prescriptions Disp Refills    meloxicam (MOBIC) 15 MG tablet [Pharmacy Med Name: MELOXICAM 15 MG TABLET] 90 tablet 0     Sig: TAKE 1 TABLET BY MOUTH DAILY       Last Appointment Date: 3/7/2024  Next Appointment Date: 9/10/2024    Allergies   Allergen Reactions    Simvastatin Other (See Comments)     Muscle cramps in legs

## 2024-04-09 NOTE — TELEPHONE ENCOUNTER
Terra called requesting a refill of the below medication which has been pended for you:     Requested Prescriptions     Pending Prescriptions Disp Refills    Semaglutide,0.25 or 0.5MG/DOS, 2 MG/1.5ML SOPN 1 mL 2     Sig: Compounded semaglutide.  0.6mg/0.5mL.  Inject 0.5mL sq weekly       Last Appointment Date: 3/7/2024  Next Appointment Date: 9/10/2024    Allergies   Allergen Reactions    Simvastatin Other (See Comments)     Muscle cramps in legs

## 2024-04-30 ENCOUNTER — TELEPHONE (OUTPATIENT)
Dept: FAMILY MEDICINE CLINIC | Age: 74
End: 2024-04-30

## 2024-04-30 DIAGNOSIS — M25.552 CHRONIC LEFT HIP PAIN: Primary | ICD-10-CM

## 2024-04-30 DIAGNOSIS — G89.29 CHRONIC LEFT HIP PAIN: Primary | ICD-10-CM

## 2024-04-30 NOTE — TELEPHONE ENCOUNTER
Patient called stating the IR fluoro guided injection she got in her left hip is wearing off 7 wks later. She is thinking at this point she would like to discuss getting this hip replaced. Referral placed to ortho.

## 2024-05-02 DIAGNOSIS — M25.552 LEFT HIP PAIN: Primary | ICD-10-CM

## 2024-05-10 ENCOUNTER — TELEMEDICINE (OUTPATIENT)
Dept: FAMILY MEDICINE CLINIC | Age: 74
End: 2024-05-10
Payer: MEDICARE

## 2024-05-10 DIAGNOSIS — Z00.00 MEDICARE ANNUAL WELLNESS VISIT, SUBSEQUENT: Primary | ICD-10-CM

## 2024-05-10 PROCEDURE — G0439 PPPS, SUBSEQ VISIT: HCPCS | Performed by: FAMILY MEDICINE

## 2024-05-10 PROCEDURE — 3017F COLORECTAL CA SCREEN DOC REV: CPT | Performed by: FAMILY MEDICINE

## 2024-05-10 PROCEDURE — 1123F ACP DISCUSS/DSCN MKR DOCD: CPT | Performed by: FAMILY MEDICINE

## 2024-05-10 ASSESSMENT — PATIENT HEALTH QUESTIONNAIRE - PHQ9
1. LITTLE INTEREST OR PLEASURE IN DOING THINGS: NOT AT ALL
SUM OF ALL RESPONSES TO PHQ9 QUESTIONS 1 & 2: 0
SUM OF ALL RESPONSES TO PHQ QUESTIONS 1-9: 0
2. FEELING DOWN, DEPRESSED OR HOPELESS: NOT AT ALL

## 2024-05-10 NOTE — PROGRESS NOTES
Medicare Annual Wellness Visit    Terra Hills is here for Medicare AWV (Subsequent; last 6/23/2023 (medicare advantage))    Assessment & Plan     Recommendations for Preventive Services Due: see orders and patient instructions/AVS.    Patient declines dexa and colon screening at this time.    Recommended screening schedule for the next 5-10 years is provided to the patient in written form: see Patient Instructions/AVS.     No follow-ups on file.     Subjective     Patient's complete Health Risk Assessment and screening values have been reviewed and are found in Flowsheets. The following problems were reviewed today and where indicated follow up appointments were made and/or referrals ordered.    Positive Risk Factor Screenings with Interventions:    Fall Risk:  Do you feel unsteady or are you worried about falling? : (!) yes (having a hip issue and feels unsteady at times)  2 or more falls in past year?: no  Fall with injury in past year?: no       Interventions:    Reviewed medications, home hazards, visual acuity, and co-morbidities that can increase risk for falls  See AVS for additional education material  Is seeing ortho surgeon next week to be evaluated for hip replacement surgery.               Activity, Diet, and Weight:  On average, how many days per week do you engage in moderate to strenuous exercise (like a brisk walk)?: 0 days  On average, how many minutes do you engage in exercise at this level?: 0 min    Do you eat balanced/healthy meals regularly?: Yes    There is no height or weight on file to calculate BMI. (!) Abnormal        Inactivity Interventions:  See AVS for additional education material  Obesity Interventions:  See AVS for additional education material             Advanced Directives:  Do you have a Living Will?: (!) No    Intervention:  has NO advanced directive - information provided            Objective      Patient-Reported Vitals  Patient-Reported Systolic (Top): 0 mmHg (patient

## 2024-05-14 ENCOUNTER — HOSPITAL ENCOUNTER (OUTPATIENT)
Dept: GENERAL RADIOLOGY | Age: 74
Discharge: HOME | End: 2024-05-16
Attending: ORTHOPAEDIC SURGERY
Payer: MEDICARE

## 2024-05-14 ENCOUNTER — OFFICE VISIT (OUTPATIENT)
Dept: ORTHOPEDIC SURGERY | Age: 74
End: 2024-05-14
Attending: ORTHOPAEDIC SURGERY
Payer: MEDICARE

## 2024-05-14 VITALS
SYSTOLIC BLOOD PRESSURE: 134 MMHG | DIASTOLIC BLOOD PRESSURE: 70 MMHG | HEART RATE: 95 BPM | BODY MASS INDEX: 36.14 KG/M2 | WEIGHT: 204 LBS | HEIGHT: 63 IN

## 2024-05-14 DIAGNOSIS — M25.552 LEFT HIP PAIN: ICD-10-CM

## 2024-05-14 DIAGNOSIS — M16.12 PRIMARY OSTEOARTHRITIS OF LEFT HIP: Primary | ICD-10-CM

## 2024-05-14 PROCEDURE — 1090F PRES/ABSN URINE INCON ASSESS: CPT | Performed by: PHYSICIAN ASSISTANT

## 2024-05-14 PROCEDURE — G8427 DOCREV CUR MEDS BY ELIG CLIN: HCPCS | Performed by: PHYSICIAN ASSISTANT

## 2024-05-14 PROCEDURE — 3078F DIAST BP <80 MM HG: CPT | Performed by: PHYSICIAN ASSISTANT

## 2024-05-14 PROCEDURE — G8400 PT W/DXA NO RESULTS DOC: HCPCS | Performed by: PHYSICIAN ASSISTANT

## 2024-05-14 PROCEDURE — 73502 X-RAY EXAM HIP UNI 2-3 VIEWS: CPT

## 2024-05-14 PROCEDURE — 99203 OFFICE O/P NEW LOW 30 MIN: CPT | Performed by: PHYSICIAN ASSISTANT

## 2024-05-14 PROCEDURE — 3017F COLORECTAL CA SCREEN DOC REV: CPT | Performed by: PHYSICIAN ASSISTANT

## 2024-05-14 PROCEDURE — G8417 CALC BMI ABV UP PARAM F/U: HCPCS | Performed by: PHYSICIAN ASSISTANT

## 2024-05-14 PROCEDURE — 3075F SYST BP GE 130 - 139MM HG: CPT | Performed by: PHYSICIAN ASSISTANT

## 2024-05-14 PROCEDURE — 1036F TOBACCO NON-USER: CPT | Performed by: PHYSICIAN ASSISTANT

## 2024-05-14 PROCEDURE — 1123F ACP DISCUSS/DSCN MKR DOCD: CPT | Performed by: PHYSICIAN ASSISTANT

## 2024-05-14 PROCEDURE — 99205 OFFICE O/P NEW HI 60 MIN: CPT | Performed by: PHYSICIAN ASSISTANT

## 2024-05-14 NOTE — H&P
History and Physical        CHIEF COMPLAINT: Left hip pain    HISTORY OF PRESENT ILLNESS:      The patient is a 73 y.o. female  who presents with a long history of left hip pain.  She states the pain is minimal in her groin but more severe in the lateral aspect of her hip.  She has been seen by chiropractor for this problem in the past where films were done which did confirm primary osteoarthritis left hip.  She has had intra-articular corticosteroid injection most recently in early March of this year.  She states this helps only minimally anymore.  She states that left hip now causes her to use a cane for ambulation and she has had 2 recent falls.  She states she has no difficulty with transferring mechanisms such as getting in and out of a car and in and out of bed.  She uses handrails throughout her house to keep from falling.  She had x-rays done in office today AP of the pelvis and 2 views left hip confirm severe primary left hip osteoarthritis with complete obliteration of the joint space.  We do feel like she would benefit from primary left total hip replacement surgery.    Past Medical History:    Past Medical History:   Diagnosis Date    Abnormal liver function tests     Mildly.    Hyperlipidemia     Hypertension     Stable.    Insulin resistance     Macular degeneration of left eye 2013    Obstructive sleep apnea     Currently utilizing BiPAP and EPAP pressure 5, IPAP pressure 9.    Osteoarthritis        Past Surgical History:    Past Surgical History:   Procedure Laterality Date    ABDOMINAL EXPLORATION SURGERY  01/01/1994    With left salpingo-oophorectomy.    HYSTEROSCOPY  08/25/2005    With hysteroscopic resection of polyps and submucous fibroid performed by Dr. Cardona.    OTHER SURGICAL HISTORY  07/12/2005    Had intraductal papilloma of the right breast excised.    OVARY REMOVAL         Medications Prior to Admission:   Prior to Admission medications    Medication Sig Start Date End Date Taking?

## 2024-05-24 ENCOUNTER — OFFICE VISIT (OUTPATIENT)
Dept: FAMILY MEDICINE CLINIC | Age: 74
End: 2024-05-24
Payer: MEDICARE

## 2024-05-24 VITALS
DIASTOLIC BLOOD PRESSURE: 68 MMHG | HEART RATE: 68 BPM | TEMPERATURE: 98.3 F | HEIGHT: 63 IN | BODY MASS INDEX: 34.91 KG/M2 | RESPIRATION RATE: 18 BRPM | OXYGEN SATURATION: 94 % | WEIGHT: 197 LBS | SYSTOLIC BLOOD PRESSURE: 112 MMHG

## 2024-05-24 DIAGNOSIS — G89.29 CHRONIC LEFT HIP PAIN: Primary | ICD-10-CM

## 2024-05-24 DIAGNOSIS — R73.01 IMPAIRED FASTING BLOOD SUGAR: ICD-10-CM

## 2024-05-24 DIAGNOSIS — E78.2 MIXED HYPERLIPIDEMIA: ICD-10-CM

## 2024-05-24 DIAGNOSIS — R52 PAIN, UNSPECIFIED: ICD-10-CM

## 2024-05-24 DIAGNOSIS — M25.552 CHRONIC LEFT HIP PAIN: Primary | ICD-10-CM

## 2024-05-24 DIAGNOSIS — Z01.818 PREOPERATIVE CLEARANCE: ICD-10-CM

## 2024-05-24 DIAGNOSIS — I10 ESSENTIAL HYPERTENSION: ICD-10-CM

## 2024-05-24 PROCEDURE — 93005 ELECTROCARDIOGRAM TRACING: CPT | Performed by: FAMILY MEDICINE

## 2024-05-24 ASSESSMENT — ENCOUNTER SYMPTOMS
COUGH: 0
TROUBLE SWALLOWING: 0
SINUS PRESSURE: 0
VOMITING: 0
RHINORRHEA: 0
EYE DISCHARGE: 0
CONSTIPATION: 0
EYE REDNESS: 0
ABDOMINAL PAIN: 0
SORE THROAT: 0
SHORTNESS OF BREATH: 0
WHEEZING: 0
NAUSEA: 0
DIARRHEA: 0

## 2024-05-24 NOTE — PROGRESS NOTES
2024     Terra Hills (:  1950) is a 73 y.o. female, here for evaluation of the following medical concerns:    HPI  Patient comes in today for preoperative clearance for upcoming proposed left total hip arthroplasty. Patient has had continued worsening pain in her left hip and has decided to go ahead and proceed with a total left hip arthroplasty.  She does need preoperative clearance.  Does have a known history of hypertension her blood pressure is stable and controlled on her current medical therapy.  Has known hyperlipidemia and impaired fasting glucose her last set of labs within the last couple months was reasonably well-controlled.  Health history is as reviewed below.  Patient is not having any other medical complaints including chest pain shortness of breath nausea vomiting diarrhea fever chills or headache..  Patient's recent lab reports are as follows:    Results for orders placed or performed during the hospital encounter of 24   CBC with Auto Differential   Result Value Ref Range    WBC 4.5 3.5 - 11.3 k/uL    RBC 5.28 (H) 3.95 - 5.11 m/uL    Hemoglobin 15.9 (H) 11.9 - 15.1 g/dL    Hematocrit 46.7 36.3 - 47.1 %    MCV 88.4 82.6 - 102.9 fL    MCH 30.1 25.2 - 33.5 pg    MCHC 34.0 (H) 25.2 - 33.5 g/dL    RDW 13.2 11.8 - 14.4 %    Platelets 148 138 - 453 k/uL    MPV 10.6 8.1 - 13.5 fL    NRBC Automated 0.0 0.0 per 100 WBC    Neutrophils % 63 36 - 65 %    Lymphocytes % 24 24 - 43 %    Monocytes % 11 3 - 12 %    Eosinophils % 1 1 - 4 %    Basophils % 1 0 - 2 %    Immature Granulocytes % 0 0 %    Neutrophils Absolute 2.81 1.50 - 8.10 k/uL    Lymphocytes Absolute 1.11 1.10 - 3.70 k/uL    Monocytes Absolute 0.51 0.10 - 1.20 k/uL    Eosinophils Absolute 0.06 0.00 - 0.44 k/uL    Basophils Absolute 0.03 0.00 - 0.20 k/uL    Immature Granulocytes Absolute <0.03 0.00 - 0.30 k/uL   Comprehensive Metabolic Panel   Result Value Ref Range    Sodium 142 135 - 144 mmol/L    Potassium 4.0 3.7 - 5.3

## 2024-06-02 DIAGNOSIS — I10 ESSENTIAL HYPERTENSION: ICD-10-CM

## 2024-06-03 ENCOUNTER — TELEPHONE (OUTPATIENT)
Dept: ORTHOPEDIC SURGERY | Age: 74
End: 2024-06-03

## 2024-06-03 DIAGNOSIS — Z01.818 PRE-OP TESTING: Primary | ICD-10-CM

## 2024-06-03 RX ORDER — ROSUVASTATIN CALCIUM 20 MG/1
TABLET, COATED ORAL
Qty: 90 TABLET | Refills: 3 | Status: SHIPPED | OUTPATIENT
Start: 2024-06-03

## 2024-06-03 RX ORDER — METOPROLOL TARTRATE 50 MG/1
TABLET, FILM COATED ORAL
Qty: 90 TABLET | Refills: 3 | Status: SHIPPED | OUTPATIENT
Start: 2024-06-03

## 2024-06-03 NOTE — TELEPHONE ENCOUNTER
[facility]     Pre-Operative Evaluation/Consultation    Name:  Terra Hills                                         Age:  73 y.o.  MRN:  [id number]       :  1950   Date:  6/3/2024         Sex: female    There were no encounter diagnoses.    Surgeon:  Dr. Richard  Procedure (Planned):  Left Total Hip Arthoplasty   Date Scheduled surgery: TBD    Attending : No att. providers found    Primary Physician:   Cardiologist: None    Type of Anesthesia Requested: General    Patient Medical history:  Allergies   Allergen Reactions    Simvastatin Other (See Comments)     Muscle cramps in legs     Social History     Tobacco Use    Smoking status: Never     Passive exposure: Past    Smokeless tobacco: Never   Substance Use Topics    Alcohol use: Yes     Comment: Rare alcohol use.    Drug use: No         Additional ordered pre-operative testing:  [x]CBC    []ABG      [] BMP   [x]URINALYSIS   [x]CMP    []HCG   []COAGS PT/INR  []T&C  []LFTs   [x]TYPE AND SCREEN    [x] EKG  [] Chest X-Ray  [] Other Radiology    [] Sent to Hospitalist None  [] Sent to Anesthesia for your review: None   [] Additional Orders: None     Comments:None   Requests: No Special requests    Signed: Leonarda Mo LPN 6/3/2024 9:02 AM

## 2024-06-10 ENCOUNTER — HOSPITAL ENCOUNTER (OUTPATIENT)
Dept: PHYSICAL THERAPY | Age: 74
Setting detail: THERAPIES SERIES
Discharge: HOME | End: 2024-06-10

## 2024-06-10 ENCOUNTER — NURSE ONLY (OUTPATIENT)
Dept: ORTHOPEDIC SURGERY | Age: 74
End: 2024-06-10
Payer: MEDICARE

## 2024-06-10 ENCOUNTER — HOSPITAL ENCOUNTER (OUTPATIENT)
Age: 74
Discharge: HOME | End: 2024-06-10
Payer: MEDICARE

## 2024-06-10 ENCOUNTER — HOSPITAL ENCOUNTER (OUTPATIENT)
Age: 74
Setting detail: SPECIMEN
Discharge: HOME | End: 2024-06-10
Payer: MEDICARE

## 2024-06-10 DIAGNOSIS — I10 ESSENTIAL HYPERTENSION: ICD-10-CM

## 2024-06-10 DIAGNOSIS — M16.12 PRIMARY OSTEOARTHRITIS OF LEFT HIP: ICD-10-CM

## 2024-06-10 DIAGNOSIS — M16.12 PRIMARY OSTEOARTHRITIS OF LEFT HIP: Primary | ICD-10-CM

## 2024-06-10 DIAGNOSIS — Z01.818 PREOPERATIVE CLEARANCE: ICD-10-CM

## 2024-06-10 DIAGNOSIS — Z01.818 PREOP TESTING: Primary | ICD-10-CM

## 2024-06-10 DIAGNOSIS — Z01.818 PREOP TESTING: ICD-10-CM

## 2024-06-10 DIAGNOSIS — R52 PAIN, UNSPECIFIED: ICD-10-CM

## 2024-06-10 LAB
ANION GAP SERPL CALCULATED.3IONS-SCNC: 10 MMOL/L (ref 9–17)
BACTERIA URNS QL MICRO: ABNORMAL
BASOPHILS # BLD: 0.03 K/UL (ref 0–0.2)
BASOPHILS NFR BLD: 1 % (ref 0–2)
BILIRUB UR QL STRIP: NEGATIVE
BUN SERPL-MCNC: 19 MG/DL (ref 8–23)
BUN/CREAT SERPL: 24 (ref 9–20)
CALCIUM SERPL-MCNC: 10 MG/DL (ref 8.6–10.4)
CHLORIDE SERPL-SCNC: 104 MMOL/L (ref 98–107)
CLARITY UR: CLEAR
CO2 SERPL-SCNC: 26 MMOL/L (ref 20–31)
COLOR UR: YELLOW
COMMENT: ABNORMAL
CREAT SERPL-MCNC: 0.8 MG/DL (ref 0.5–0.9)
EOSINOPHIL # BLD: 0.06 K/UL (ref 0–0.44)
EOSINOPHILS RELATIVE PERCENT: 1 % (ref 1–4)
EPI CELLS #/AREA URNS HPF: ABNORMAL /HPF (ref 0–5)
ERYTHROCYTE [DISTWIDTH] IN BLOOD BY AUTOMATED COUNT: 12.7 % (ref 11.8–14.4)
GFR, ESTIMATED: 78 ML/MIN/1.73M2
GLUCOSE SERPL-MCNC: 98 MG/DL (ref 70–99)
GLUCOSE UR STRIP-MCNC: NEGATIVE MG/DL
HCT VFR BLD AUTO: 39.5 % (ref 36.3–47.1)
HGB BLD-MCNC: 13.6 G/DL (ref 11.9–15.1)
HGB UR QL STRIP.AUTO: NEGATIVE
IMM GRANULOCYTES # BLD AUTO: <0.03 K/UL (ref 0–0.3)
IMM GRANULOCYTES NFR BLD: 0 %
KETONES UR STRIP-MCNC: NEGATIVE MG/DL
LEUKOCYTE ESTERASE UR QL STRIP: ABNORMAL
LYMPHOCYTES NFR BLD: 0.74 K/UL (ref 1.1–3.7)
LYMPHOCYTES RELATIVE PERCENT: 16 % (ref 24–43)
MCH RBC QN AUTO: 29.8 PG (ref 25.2–33.5)
MCHC RBC AUTO-ENTMCNC: 34.4 G/DL (ref 25.2–33.5)
MCV RBC AUTO: 86.6 FL (ref 82.6–102.9)
MONOCYTES NFR BLD: 0.44 K/UL (ref 0.1–1.2)
MONOCYTES NFR BLD: 9 % (ref 3–12)
MUCOUS THREADS URNS QL MICRO: ABNORMAL
NEUTROPHILS NFR BLD: 73 % (ref 36–65)
NEUTS SEG NFR BLD: 3.39 K/UL (ref 1.5–8.1)
NITRITE UR QL STRIP: NEGATIVE
NRBC BLD-RTO: 0 PER 100 WBC
PH UR STRIP: 5.5 [PH] (ref 5–6)
PLATELET # BLD AUTO: 159 K/UL (ref 138–453)
PMV BLD AUTO: 11 FL (ref 8.1–13.5)
POTASSIUM SERPL-SCNC: 3.9 MMOL/L (ref 3.7–5.3)
PROT UR STRIP-MCNC: NEGATIVE MG/DL
RBC # BLD AUTO: 4.56 M/UL (ref 3.95–5.11)
RBC #/AREA URNS HPF: ABNORMAL /HPF (ref 0–4)
SODIUM SERPL-SCNC: 140 MMOL/L (ref 135–144)
SP GR UR STRIP: 1.02 (ref 1.01–1.02)
UROBILINOGEN UR STRIP-ACNC: NORMAL EU/DL (ref 0–1)
WBC #/AREA URNS HPF: ABNORMAL /HPF (ref 0–4)
WBC OTHER # BLD: 4.7 K/UL (ref 3.5–11.3)

## 2024-06-10 PROCEDURE — 85025 COMPLETE CBC W/AUTO DIFF WBC: CPT

## 2024-06-10 PROCEDURE — 80048 BASIC METABOLIC PNL TOTAL CA: CPT

## 2024-06-10 PROCEDURE — 9990000011 HC NO CHARGE THERAPY VISIT: Performed by: PHYSICAL THERAPIST

## 2024-06-10 PROCEDURE — 81015 MICROSCOPIC EXAM OF URINE: CPT

## 2024-06-10 PROCEDURE — 87641 MR-STAPH DNA AMP PROBE: CPT

## 2024-06-10 PROCEDURE — 36415 COLL VENOUS BLD VENIPUNCTURE: CPT

## 2024-06-10 PROCEDURE — 99215 OFFICE O/P EST HI 40 MIN: CPT

## 2024-06-10 PROCEDURE — 87086 URINE CULTURE/COLONY COUNT: CPT

## 2024-06-10 NOTE — PROGRESS NOTES
Surgery scheduled for 06/18/2024 with Dr. Richard.   Surgical consent signed, MRSA collected. Reviewed washing instructions.

## 2024-06-10 NOTE — PROGRESS NOTES
Physical Therapy  Initial Assessment  Date: 6/10/2024  Patient Name: Terra Hills  MRN: [id number]  : 1950    Referring Physician: Eric Richard MD haman   PCP: Desirae Calhoun DO     Medical Diagnosis: Encounter for other preprocedural examination [Z01.818] OA L hip  No data recorded    Insurance: Payor: [facility] MEDICARE / Plan: [facility] MEDICARE COMPLETE / Product Type: *No Product type* /   Insurance ID: [id number] - (Medicare Managed)      Restrictions:       Subjective:   General  Chart Reviewed: Yes  Patient Assessed for Rehabilitation Services: Yes  History obtained from:: Patient, Chart Review  Family/Caregiver Present: No  Diagnosis: OA L hip  Referring Provider (secondary): regina  Follows Commands: Within Functional Limits  PT Visit Information  Onset Date: 06/10/24  PT Insurance Information: [facility] Medicare  Referring Provider (secondary): regina  Subjective  Subjective: Is set for L BOSSMAN 24.  Pain Screening  Patient Currently in Pain: Yes  Pain Assessment: 0-10  Worst Pain Level: 8       Vision/Hearing:  Vision  Vision: Within Functional Limits  Hearing  Hearing: Within functional limits    Orientation:  Orientation  Overall Orientation Status: Within Normal Limits  Follows Commands: Within Functional Limits    Social History:  Social History  Lives With: Spouse  Type of Home: House  Home Layout: One level  Home Access: Level entry  Home Equipment: Walker - Rolling;Cane    Functional Status:  Functional Status  Prior level of function: Independent  Occupation: Retired  Receives Help From: Family  ADL Assistance: Independent  Homemaking Assistance: Independent  Ambulation Assistance: Independent  Transfer Assistance: Independent  Active : Yes  Additional Comments: Caregiver for disabled     Objective:     Ambulation  WB Status: WBAL L  Ambulation  Surface: Level tile  Device: Rolling Walker  Assistance: Modified Independent  Comments: Reviewed proper WBAT gait  Stairs/Curb  Stairs?:

## 2024-06-10 NOTE — PROGRESS NOTES
Preop visit with patient regarding discharge needs after LTHA surgery. Pt states that she is planning on doing outpt PT at [facility]. Pt has access to walker and has family that will be able to drive her to appts. Pt familiar with PCU as she has visited family members in the past. 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 Pt planning on sending  to inpatient hospice when having surgery. Pt denies any additional discharge needs at present time.

## 2024-06-11 LAB
MICROORGANISM SPEC CULT: NORMAL
MRSA, DNA, NASAL: NEGATIVE
SERVICE CMNT-IMP: NORMAL
SPECIMEN DESCRIPTION: NORMAL
SPECIMEN DESCRIPTION: NORMAL

## 2024-06-13 ENCOUNTER — OFFICE VISIT (OUTPATIENT)
Dept: ORTHOPEDIC SURGERY | Age: 74
End: 2024-06-13

## 2024-06-13 VITALS — RESPIRATION RATE: 18 BRPM | BODY MASS INDEX: 34.91 KG/M2 | WEIGHT: 197 LBS | HEIGHT: 63 IN

## 2024-06-13 DIAGNOSIS — M16.12 PRIMARY OSTEOARTHRITIS OF LEFT HIP: Primary | ICD-10-CM

## 2024-06-14 ENCOUNTER — TELEPHONE (OUTPATIENT)
Dept: ORTHOPEDIC SURGERY | Age: 74
End: 2024-06-14

## 2024-06-14 DIAGNOSIS — Z01.818 PRE-OP TESTING: Primary | ICD-10-CM

## 2024-06-18 ENCOUNTER — TELEPHONE (OUTPATIENT)
Dept: FAMILY MEDICINE CLINIC | Age: 74
End: 2024-06-18

## 2024-06-18 NOTE — TELEPHONE ENCOUNTER
Terra called requesting a refill of the below medication which has been pended for you: patient has requested increase as loaded.    Requested Prescriptions     Pending Prescriptions Disp Refills    Semaglutide, 1 MG/DOSE, (OZEMPIC) 4 MG/3ML SOPN sc injection 1.32 mL 2     Sig: Inject 0.33 ml (1.2 mg) once weekly       Last Appointment Date: 5/24/2024  Next Appointment Date: 9/10/2024    Allergies   Allergen Reactions    Simvastatin Other (See Comments)     Muscle cramps in legs

## 2024-06-27 ENCOUNTER — HOSPITAL ENCOUNTER (OUTPATIENT)
Dept: PREADMISSION TESTING | Age: 74
Discharge: HOME | End: 2024-06-27
Payer: MEDICARE

## 2024-06-27 VITALS
RESPIRATION RATE: 14 BRPM | BODY MASS INDEX: 34.55 KG/M2 | DIASTOLIC BLOOD PRESSURE: 78 MMHG | OXYGEN SATURATION: 100 % | TEMPERATURE: 96.9 F | HEART RATE: 63 BPM | HEIGHT: 63 IN | WEIGHT: 195 LBS | SYSTOLIC BLOOD PRESSURE: 149 MMHG

## 2024-06-27 LAB
ABO + RH BLD: NORMAL
ARM BAND NUMBER: NORMAL
BLOOD BANK SAMPLE EXPIRATION: NORMAL
BLOOD GROUP ANTIBODIES SERPL: NEGATIVE

## 2024-06-27 PROCEDURE — 86901 BLOOD TYPING SEROLOGIC RH(D): CPT

## 2024-06-27 PROCEDURE — 36415 COLL VENOUS BLD VENIPUNCTURE: CPT

## 2024-06-27 PROCEDURE — 86900 BLOOD TYPING SEROLOGIC ABO: CPT

## 2024-06-27 PROCEDURE — 86850 RBC ANTIBODY SCREEN: CPT

## 2024-06-27 RX ORDER — ACETAMINOPHEN 500 MG
1000 TABLET ORAL ONCE
OUTPATIENT
Start: 2024-07-16

## 2024-06-27 RX ORDER — GABAPENTIN 300 MG/1
300 CAPSULE ORAL ONCE
OUTPATIENT
Start: 2024-07-16

## 2024-06-27 RX ORDER — CELECOXIB 200 MG/1
200 CAPSULE ORAL ONCE
OUTPATIENT
Start: 2024-07-16

## 2024-06-27 ASSESSMENT — PROMIS GLOBAL HEALTH SCALE
WHO IS THE PERSON COMPLETING THE PROMIS V1.1 SURVEY?: SELF
SUM OF RESPONSES TO QUESTIONS 2, 4, 5, & 10: 14
TO WHAT EXTENT ARE YOU ABLE TO CARRY OUT YOUR EVERYDAY PHYSICAL ACTIVITIES SUCH AS WALKING, CLIMBING STAIRS, CARRYING GROCERIES, OR MOVING A CHAIR [ON A SCALE OF 1 (NOT AT ALL) TO 5 (COMPLETELY)]?: A LITTLE
IN GENERAL, HOW WOULD YOU RATE YOUR SATISFACTION WITH YOUR SOCIAL ACTIVITIES AND RELATIONSHIPS [ON A SCALE OF 1 (POOR) TO 5 (EXCELLENT)]?: VERY GOOD
SUM OF RESPONSES TO QUESTIONS 3, 6, 7, & 8: 15
IN THE PAST 7 DAYS, HOW WOULD YOU RATE YOUR PAIN ON AVERAGE [ON A SCALE FROM 0 (NO PAIN) TO 10 (WORST IMAGINABLE PAIN)]?: 7
IN THE PAST 7 DAYS, HOW WOULD YOU RATE YOUR FATIGUE ON AVERAGE [ON A SCALE FROM 1 (NONE) TO 5 (VERY SEVERE)]?: MODERATE
IN GENERAL, WOULD YOU SAY YOUR QUALITY OF LIFE IS...[ON A SCALE OF 1 (POOR) TO 5 (EXCELLENT)]: GOOD
IN GENERAL, HOW WOULD YOU RATE YOUR PHYSICAL HEALTH [ON A SCALE OF 1 (POOR) TO 5 (EXCELLENT)]?: GOOD
IN GENERAL, HOW WOULD YOU RATE YOUR MENTAL HEALTH, INCLUDING YOUR MOOD AND YOUR ABILITY TO THINK [ON A SCALE OF 1 (POOR) TO 5 (EXCELLENT)]?: VERY GOOD
IN GENERAL, WOULD YOU SAY YOUR HEALTH IS...[ON A SCALE OF 1 (POOR) TO 5 (EXCELLENT)]: GOOD
IN GENERAL, PLEASE RATE HOW WELL YOU CARRY OUT YOUR USUAL SOCIAL ACTIVITIES (INCLUDES ACTIVITIES AT HOME, AT WORK, AND IN YOUR COMMUNITY, AND RESPONSIBILITIES AS A PARENT, CHILD, SPOUSE, EMPLOYEE, FRIEND, ETC) [ON A SCALE OF 1 (POOR) TO 5 (EXCELLENT)]?: VERY GOOD
HOW IS THE PROMIS V1.1 BEING ADMINISTERED?: PAPER
IN THE PAST 7 DAYS, HOW OFTEN HAVE YOU BEEN BOTHERED BY EMOTIONAL PROBLEMS, SUCH AS FEELING ANXIOUS, DEPRESSED, OR IRRITABLE [ON A SCALE FROM 1 (NEVER) TO 5 (ALWAYS)]?: SOMETIMES

## 2024-06-27 ASSESSMENT — HOOS JR
SITTING: MILD
HOOS JR RAW SCORE: 15
RISING FROM SITTING: SEVERE
HOOS JR RAW SCORE: 15
LYING IN BED (TURNING OVER, MAINTAINING HIP POSITION): MODERATE
HOOS JR TOTAL INTERVAL SCORE: 43.335
WALKING ON UNEVEN SURFACE: SEVERE
GOING UP OR DOWN STAIRS: SEVERE
BENDING TO THE FLOOR TO PICK UP OBJECT: SEVERE

## 2024-06-27 NOTE — PROGRESS NOTES
[facility] Joint Replacement Pre-surgical Assessment    Scheduled Surgery Date: 07/16/2024  Surgery Time: 1435    Surgeon: GLEN  Procedure: left Total Hip    Primary Insurance Coverage [facility] MEDICARE  Pre-op class attended YES 1100.    PCP: Desirae Calhoun DO  Clearance received by PCP: Yes    Anticipated Discharge Plan: home  Agency (if applicable): [facility]    Significant PMH:   Surgical History    Procedure Laterality Date Comment Source   ABDOMINAL EXPLORATION SURGERY  01/01/1994 With left salpingo-oophorectomy.    HYSTEROSCOPY  08/25/2005 With hysteroscopic resection of polyps and submucous fibroid performed by Dr. Cardona.    OTHER SURGICAL HISTORY  07/12/2005 Had intraductal papilloma of the right breast excised.    OVARY REMOVAL         ED Notes    ED Notes    Medical History    Diagnosis Date Comment Source   Abnormal liver function tests  Mildly.    Hyperlipidemia      Hypertension  Stable.    Insulin resistance      Macular degeneration of left eye 01/01/2013     Obstructive sleep apnea  Currently utilizing BiPAP and EPAP pressure 5, IPAP pressure 9.    Osteoarthritis      Thyroid nodule  per pt was bx and came back benign           Smoking history: none    Alcohol history: Never drinks    Concerns prior to surgery: PT MET WITH OT AFTER CLASS. PT HAS A FWW.    Electronically signed by: ATA SCHWARTZ RN on 6/27/2024 at 3:43 PM

## 2024-06-27 NOTE — PRE-PROCEDURE INSTRUCTIONS
Chlorhexidine Gluconate (CHG) use antibacterial soap instead.    If you were given Chlorhexidine soap, please follow the instructions included with the soap to shower the night before and the morning of surgery.  If you were not given Chlorhexidine, please shower or bathe the morning of surgery using an antibacterial soap.  Please dress in clean clothing after showering.     General information about Chlorhexidine Gluconate (CHG)   * It should not be used on hair, face, ears, genital area or skin that is not intact.   * It should not be used if breast feeding.   * It should not be used if you allergic to CHG.   * See the bottle for additional information.    Do Not apply any powder, deodorant, lotion/cream/oil, perfume/aftershave, cosmetics, or alcohol-based skin or hair products after showering.    Do Not shave near the planned surgical site unless specifically instructed to.     1. Wash your hair using your normal shampoo and rinse.   2. Wash your face and genital area (privates) using your own shampoo and rinse.   3. Turn off the shower water and pour half of the bottle of CHG onto a clean washcloth.   4. Wash your body from neck down to toes. Pay special attention to your surgical site.   5. Leave the CHG on your skin for 5 minutes and rinse it off.   6. Pat dry with a clean towel, sleep in clean clothes and clean sheets.   7. Do not shave near the surgical site.   8. Repeat process the morning of surgery.    CHG may cause dry skin, but should not cause redness, rash, or intense itching. If an suspect an allergic reaction, use your own soap and shampoo for the morning of surgery and report reaction to the pre-operative nurse.      Additional Instructions:      1. You may drink two-20oz Gatorades (Powerade or Liquid IV are ok too) prior to midnight before surgery.  Day of surgery-you may have up to 20oz of water up to 3 hours prior to scheduled surgery.  You will receive a phone call the day before from the  must remain with you for the first 24 hours after your surgery if you receive anesthesia or medication.  If you do not have someone to stay with you, your procedure may be cancelled.  As a patient at [facility] you can expect quality medical and nursing care that is centered on you individual needs.  Our goal is to make your surgical experience as comfortable as possible.    Any questions about preparing for your surgery please call [phone].    ____________________________   ____________________________  Signature (Patient)                                 Signature (Nurse)                     Date

## 2024-06-27 NOTE — H&P
History and Physical Service   [facility]    HISTORY AND PHYSICAL EXAMINATION            Date of Evaluation: 6/27/2024  Patient name:  Terra Hills  MRN:   [id number]  YOB: 1950  PCP:    Desirae Calhoun DO    History Obtained From:     Patient, medical records    History of Present Illness:     This is Terra Hills a 74 y.o. female who presents for a pre-admission testing appointment for an upcoming HIP TOTAL ARTHROPLASTY ANTERIOR APPROACH by Nathaniel Estrella DO scheduled on 07/16/2024 at 1115 due to Osteoarthritis of left hip. The patient's chief complaint is 8-9/10 left hip pain which has progressively worsened over the past 7 years. Left hip pain is aggravated by walking and going up and down the stairs and is minimally relieved with rest, ice, and Tylenol as needed. Greatest improvement in pain is noticed when offloading her weight with using a walker. Prior treatment includes steroid injections and physical therapy with minimal short-term relief. The patient was evaluated by Dr. Estrella who recommended the surgical intervention. Denies recent falls and injuries.     Functional Capacity:  1) Pt is not able to walk 2 city blocks on level ground without hip pain  2) Pt is not able to climb 2 flights of stairs without hip pain  3) Pt is not able to walk up a hill for 1-2 city blocks without hip pain     States prior to severe hip pain she was active with no breathing limitations but she is currently deconditioned due to her hip.     Past Medical History:     Past Medical History:   Diagnosis Date    Abnormal liver function tests     Mildly.    Hyperlipidemia     Hypertension     Stable.    Insulin resistance     Macular degeneration of left eye 01/01/2013    Obstructive sleep apnea     Currently utilizing BiPAP and EPAP pressure 5, IPAP pressure 9.    Osteoarthritis     Thyroid nodule     per pt was bx and came back benign        Past Surgical History:     Past  smoked. She has been exposed to tobacco smoke. She has never used smokeless tobacco.  Alcohol:      reports current alcohol use.  Drug Use:  reports no history of drug use.    Family History:     Family History   Problem Relation Age of Onset    High Blood Pressure Mother     Thyroid Disease Mother     Cancer Mother         Neck    Heart Disease Father     Heart Disease Maternal Grandmother     High Blood Pressure Maternal Grandmother     Stroke Maternal Grandmother     Heart Disease Maternal Grandfather     High Blood Pressure Maternal Grandfather     Stroke Maternal Grandfather     Cancer Child         Clavicle.    High Blood Pressure Other        Review of Systems:     Positive and Negative as described in HPI.    CONSTITUTIONAL: Negative for fevers, chills, sweats, fatigue, and weight loss.  HEENT: Contacts Negative for hearing changes, rhinorrhea, and throat pain.  RESPIRATORY: DERIAN- uses BiPAP- managed by PCP Negative for shortness of breath, cough, congestion, and wheezing.  CARDIOVASCULAR: Hypertension. Hyperlipidemia. Negative for chest pain, blood clot, irregular heartbeat, and palpitations.  GASTROINTESTINAL: Constipation- managed with OTC stool softeners Negative for reflux, nausea, vomiting, diarrhea, change in bowel habits, and abdominal pain.   GENITOURINARY: Urinary urgency Negative for difficulty of urination, burning with urination, and frequency.   INTEGUMENT: Hx disseminated shingles in past. Negative for rash, and easy bruising. Instructed pt to call Dr. Estrella as soon as possible if a rash or wound develops prior to surgery.  Pt voiced understanding.  HEMATOLOGIC/LYMPHATIC: Negative for swelling/edema.   ALLERGIC/IMMUNOLOGIC: Negative for urticaria and itching.  ENDOCRINE: Thyroid nodule- biopsy in past benign. Insulin resistance- on Ozempic. Negative for increase in thirst, increase in urination, and heat or cold intolerance.  MUSCULOSKELETAL: See HPI.   NEUROLOGICAL: Carpal tunnel bilaterally

## 2024-07-01 DIAGNOSIS — Z98.890 STATUS POST SURGERY: Primary | ICD-10-CM

## 2024-07-01 DIAGNOSIS — M16.12 PRIMARY OSTEOARTHRITIS OF LEFT HIP: ICD-10-CM

## 2024-07-16 ENCOUNTER — APPOINTMENT (OUTPATIENT)
Dept: GENERAL RADIOLOGY | Age: 74
End: 2024-07-16
Attending: ORTHOPAEDIC SURGERY
Payer: MEDICARE

## 2024-07-16 ENCOUNTER — HOSPITAL ENCOUNTER (OUTPATIENT)
Age: 74
Discharge: HOME | End: 2024-07-16
Attending: ORTHOPAEDIC SURGERY | Admitting: ORTHOPAEDIC SURGERY
Payer: MEDICARE

## 2024-07-16 ENCOUNTER — ANESTHESIA (OUTPATIENT)
Dept: OPERATING ROOM | Age: 74
End: 2024-07-16
Payer: MEDICARE

## 2024-07-16 ENCOUNTER — ANESTHESIA EVENT (OUTPATIENT)
Dept: OPERATING ROOM | Age: 74
End: 2024-07-16
Payer: MEDICARE

## 2024-07-16 VITALS
TEMPERATURE: 97.3 F | HEIGHT: 63 IN | OXYGEN SATURATION: 100 % | RESPIRATION RATE: 10 BRPM | WEIGHT: 195 LBS | HEART RATE: 82 BPM | SYSTOLIC BLOOD PRESSURE: 127 MMHG | DIASTOLIC BLOOD PRESSURE: 67 MMHG | BODY MASS INDEX: 34.55 KG/M2

## 2024-07-16 DIAGNOSIS — G89.18 POST-OP PAIN: Primary | ICD-10-CM

## 2024-07-16 PROBLEM — Z96.642 STATUS POST TOTAL HIP REPLACEMENT, LEFT: Status: ACTIVE | Noted: 2024-07-16

## 2024-07-16 PROCEDURE — 97162 PT EVAL MOD COMPLEX 30 MIN: CPT

## 2024-07-16 PROCEDURE — 2580000003 HC RX 258

## 2024-07-16 PROCEDURE — 97116 GAIT TRAINING THERAPY: CPT

## 2024-07-16 PROCEDURE — 6360000002 HC RX W HCPCS: Performed by: NURSE ANESTHETIST, CERTIFIED REGISTERED

## 2024-07-16 PROCEDURE — 3600000005 HC SURGERY LEVEL 5 BASE: Performed by: ORTHOPAEDIC SURGERY

## 2024-07-16 PROCEDURE — 3600000015 HC SURGERY LEVEL 5 ADDTL 15MIN: Performed by: ORTHOPAEDIC SURGERY

## 2024-07-16 PROCEDURE — 73502 X-RAY EXAM HIP UNI 2-3 VIEWS: CPT

## 2024-07-16 PROCEDURE — 2580000003 HC RX 258: Performed by: ANESTHESIOLOGY

## 2024-07-16 PROCEDURE — 7100000000 HC PACU RECOVERY - FIRST 15 MIN: Performed by: ORTHOPAEDIC SURGERY

## 2024-07-16 PROCEDURE — C1776 JOINT DEVICE (IMPLANTABLE): HCPCS | Performed by: ORTHOPAEDIC SURGERY

## 2024-07-16 PROCEDURE — 6360000002 HC RX W HCPCS: Performed by: ORTHOPAEDIC SURGERY

## 2024-07-16 PROCEDURE — C9290 INJ, BUPIVACAINE LIPOSOME: HCPCS | Performed by: ORTHOPAEDIC SURGERY

## 2024-07-16 PROCEDURE — 2500000003 HC RX 250 WO HCPCS: Performed by: NURSE ANESTHETIST, CERTIFIED REGISTERED

## 2024-07-16 PROCEDURE — 27130 TOTAL HIP ARTHROPLASTY: CPT | Performed by: ORTHOPAEDIC SURGERY

## 2024-07-16 PROCEDURE — 2709999900 HC NON-CHARGEABLE SUPPLY: Performed by: ORTHOPAEDIC SURGERY

## 2024-07-16 PROCEDURE — 6370000000 HC RX 637 (ALT 250 FOR IP): Performed by: ANESTHESIOLOGY

## 2024-07-16 PROCEDURE — 97110 THERAPEUTIC EXERCISES: CPT

## 2024-07-16 PROCEDURE — 6370000000 HC RX 637 (ALT 250 FOR IP)

## 2024-07-16 PROCEDURE — 6360000002 HC RX W HCPCS: Performed by: ANESTHESIOLOGY

## 2024-07-16 PROCEDURE — 3700000000 HC ANESTHESIA ATTENDED CARE: Performed by: ORTHOPAEDIC SURGERY

## 2024-07-16 PROCEDURE — 7100000001 HC PACU RECOVERY - ADDTL 15 MIN: Performed by: ORTHOPAEDIC SURGERY

## 2024-07-16 PROCEDURE — 97535 SELF CARE MNGMENT TRAINING: CPT

## 2024-07-16 PROCEDURE — 3700000001 HC ADD 15 MINUTES (ANESTHESIA): Performed by: ORTHOPAEDIC SURGERY

## 2024-07-16 PROCEDURE — 97530 THERAPEUTIC ACTIVITIES: CPT

## 2024-07-16 PROCEDURE — 2720000010 HC SURG SUPPLY STERILE: Performed by: ORTHOPAEDIC SURGERY

## 2024-07-16 PROCEDURE — 97166 OT EVAL MOD COMPLEX 45 MIN: CPT

## 2024-07-16 DEVICE — DOUBLE MOBILITY ACETABULAR SHELL Ø52
Type: IMPLANTABLE DEVICE | Site: HIP | Status: FUNCTIONAL
Brand: MPACT DOUBLE MOBILITY SHELLS

## 2024-07-16 DEVICE — MASTERLOC CEMENTLESS TI COATED LAT STEM # 7
Type: IMPLANTABLE DEVICE | Site: HIP | Status: FUNCTIONAL
Brand: MASTERLOC FEMORAL STEMS

## 2024-07-16 DEVICE — HC PE LINER 28 / DMF
Type: IMPLANTABLE DEVICE | Site: HIP | Status: FUNCTIONAL
Brand: DOUBLE MOBILITY LINER

## 2024-07-16 DEVICE — FEMORAL HEAD Ø 28 SIZE S
Type: IMPLANTABLE DEVICE | Site: HIP | Status: FUNCTIONAL
Brand: COCR FEMORAL BALL HEAD

## 2024-07-16 RX ORDER — FENTANYL CITRATE 50 UG/ML
INJECTION, SOLUTION INTRAMUSCULAR; INTRAVENOUS PRN
Status: DISCONTINUED | OUTPATIENT
Start: 2024-07-16 | End: 2024-07-16 | Stop reason: SDUPTHER

## 2024-07-16 RX ORDER — PHENYLEPHRINE HCL IN 0.9% NACL 1 MG/10 ML
SYRINGE (ML) INTRAVENOUS PRN
Status: DISCONTINUED | OUTPATIENT
Start: 2024-07-16 | End: 2024-07-16 | Stop reason: SDUPTHER

## 2024-07-16 RX ORDER — TRANEXAMIC ACID 100 MG/ML
INJECTION, SOLUTION INTRAVENOUS
Status: COMPLETED
Start: 2024-07-16 | End: 2024-07-16

## 2024-07-16 RX ORDER — BISACODYL 5 MG/1
5 TABLET, DELAYED RELEASE ORAL DAILY
Status: DISCONTINUED | OUTPATIENT
Start: 2024-07-16 | End: 2024-07-16 | Stop reason: HOSPADM

## 2024-07-16 RX ORDER — SODIUM CHLORIDE 9 MG/ML
INJECTION, SOLUTION INTRAVENOUS CONTINUOUS
Status: DISCONTINUED | OUTPATIENT
Start: 2024-07-16 | End: 2024-07-16 | Stop reason: HOSPADM

## 2024-07-16 RX ORDER — PROCHLORPERAZINE EDISYLATE 5 MG/ML
5 INJECTION INTRAMUSCULAR; INTRAVENOUS
Status: DISCONTINUED | OUTPATIENT
Start: 2024-07-16 | End: 2024-07-16 | Stop reason: HOSPADM

## 2024-07-16 RX ORDER — OXYCODONE HYDROCHLORIDE AND ACETAMINOPHEN 5; 325 MG/1; MG/1
1 TABLET ORAL EVERY 6 HOURS PRN
Qty: 28 TABLET | Refills: 0 | Status: SHIPPED | OUTPATIENT
Start: 2024-07-16 | End: 2024-07-23

## 2024-07-16 RX ORDER — ACETAMINOPHEN 325 MG/1
650 TABLET ORAL EVERY 6 HOURS
Status: DISCONTINUED | OUTPATIENT
Start: 2024-07-16 | End: 2024-07-16 | Stop reason: HOSPADM

## 2024-07-16 RX ORDER — ONDANSETRON 2 MG/ML
4 INJECTION INTRAMUSCULAR; INTRAVENOUS EVERY 6 HOURS PRN
Status: DISCONTINUED | OUTPATIENT
Start: 2024-07-16 | End: 2024-07-16 | Stop reason: HOSPADM

## 2024-07-16 RX ORDER — ONDANSETRON 2 MG/ML
4 INJECTION INTRAMUSCULAR; INTRAVENOUS
Status: DISCONTINUED | OUTPATIENT
Start: 2024-07-16 | End: 2024-07-16 | Stop reason: HOSPADM

## 2024-07-16 RX ORDER — SODIUM CHLORIDE 9 MG/ML
INJECTION, SOLUTION INTRAVENOUS PRN
Status: DISCONTINUED | OUTPATIENT
Start: 2024-07-16 | End: 2024-07-16 | Stop reason: HOSPADM

## 2024-07-16 RX ORDER — CELECOXIB 200 MG/1
200 CAPSULE ORAL ONCE
Status: COMPLETED | OUTPATIENT
Start: 2024-07-16 | End: 2024-07-16

## 2024-07-16 RX ORDER — SODIUM CHLORIDE 0.9 % (FLUSH) 0.9 %
5-40 SYRINGE (ML) INJECTION EVERY 12 HOURS SCHEDULED
Status: DISCONTINUED | OUTPATIENT
Start: 2024-07-16 | End: 2024-07-16 | Stop reason: HOSPADM

## 2024-07-16 RX ORDER — ACETAMINOPHEN 500 MG
1000 TABLET ORAL ONCE
Status: COMPLETED | OUTPATIENT
Start: 2024-07-16 | End: 2024-07-16

## 2024-07-16 RX ORDER — GABAPENTIN 300 MG/1
300 CAPSULE ORAL ONCE
Status: DISCONTINUED | OUTPATIENT
Start: 2024-07-16 | End: 2024-07-16

## 2024-07-16 RX ORDER — ROCURONIUM BROMIDE 10 MG/ML
INJECTION, SOLUTION INTRAVENOUS PRN
Status: DISCONTINUED | OUTPATIENT
Start: 2024-07-16 | End: 2024-07-16 | Stop reason: SDUPTHER

## 2024-07-16 RX ORDER — SODIUM CHLORIDE 0.9 % (FLUSH) 0.9 %
5-40 SYRINGE (ML) INJECTION PRN
Status: DISCONTINUED | OUTPATIENT
Start: 2024-07-16 | End: 2024-07-16 | Stop reason: HOSPADM

## 2024-07-16 RX ORDER — FENTANYL CITRATE 50 UG/ML
50 INJECTION, SOLUTION INTRAMUSCULAR; INTRAVENOUS EVERY 5 MIN PRN
Status: DISCONTINUED | OUTPATIENT
Start: 2024-07-16 | End: 2024-07-16 | Stop reason: HOSPADM

## 2024-07-16 RX ORDER — OXYCODONE HYDROCHLORIDE 5 MG/1
10 TABLET ORAL EVERY 4 HOURS PRN
Status: DISCONTINUED | OUTPATIENT
Start: 2024-07-16 | End: 2024-07-16 | Stop reason: HOSPADM

## 2024-07-16 RX ORDER — DOCUSATE SODIUM 100 MG/1
100 CAPSULE, LIQUID FILLED ORAL 2 TIMES DAILY
Qty: 60 CAPSULE | Refills: 0 | Status: SHIPPED | OUTPATIENT
Start: 2024-07-16 | End: 2024-08-15

## 2024-07-16 RX ORDER — EPHEDRINE SULFATE/0.9% NACL/PF 25 MG/5 ML
SYRINGE (ML) INTRAVENOUS PRN
Status: DISCONTINUED | OUTPATIENT
Start: 2024-07-16 | End: 2024-07-16 | Stop reason: SDUPTHER

## 2024-07-16 RX ORDER — DEXAMETHASONE SODIUM PHOSPHATE 10 MG/ML
INJECTION, SOLUTION INTRAMUSCULAR; INTRAVENOUS PRN
Status: DISCONTINUED | OUTPATIENT
Start: 2024-07-16 | End: 2024-07-16 | Stop reason: SDUPTHER

## 2024-07-16 RX ORDER — FENTANYL CITRATE 50 UG/ML
25 INJECTION, SOLUTION INTRAMUSCULAR; INTRAVENOUS EVERY 5 MIN PRN
Status: COMPLETED | OUTPATIENT
Start: 2024-07-16 | End: 2024-07-16

## 2024-07-16 RX ORDER — PROPOFOL 10 MG/ML
INJECTION, EMULSION INTRAVENOUS PRN
Status: DISCONTINUED | OUTPATIENT
Start: 2024-07-16 | End: 2024-07-16 | Stop reason: SDUPTHER

## 2024-07-16 RX ORDER — LIDOCAINE HYDROCHLORIDE 10 MG/ML
1 INJECTION, SOLUTION EPIDURAL; INFILTRATION; INTRACAUDAL; PERINEURAL
Status: DISCONTINUED | OUTPATIENT
Start: 2024-07-17 | End: 2024-07-16 | Stop reason: HOSPADM

## 2024-07-16 RX ORDER — ONDANSETRON 2 MG/ML
INJECTION INTRAMUSCULAR; INTRAVENOUS PRN
Status: DISCONTINUED | OUTPATIENT
Start: 2024-07-16 | End: 2024-07-16 | Stop reason: SDUPTHER

## 2024-07-16 RX ORDER — OXYCODONE HYDROCHLORIDE 5 MG/1
5 TABLET ORAL EVERY 4 HOURS PRN
Status: DISCONTINUED | OUTPATIENT
Start: 2024-07-16 | End: 2024-07-16 | Stop reason: HOSPADM

## 2024-07-16 RX ORDER — DIPHENHYDRAMINE HYDROCHLORIDE 50 MG/ML
12.5 INJECTION INTRAMUSCULAR; INTRAVENOUS
Status: DISCONTINUED | OUTPATIENT
Start: 2024-07-16 | End: 2024-07-16 | Stop reason: HOSPADM

## 2024-07-16 RX ORDER — PROMETHAZINE HYDROCHLORIDE 12.5 MG/1
12.5 TABLET ORAL EVERY 6 HOURS PRN
Status: DISCONTINUED | OUTPATIENT
Start: 2024-07-16 | End: 2024-07-16 | Stop reason: HOSPADM

## 2024-07-16 RX ORDER — SODIUM CHLORIDE, SODIUM LACTATE, POTASSIUM CHLORIDE, CALCIUM CHLORIDE 600; 310; 30; 20 MG/100ML; MG/100ML; MG/100ML; MG/100ML
INJECTION, SOLUTION INTRAVENOUS CONTINUOUS
Status: DISCONTINUED | OUTPATIENT
Start: 2024-07-16 | End: 2024-07-16 | Stop reason: HOSPADM

## 2024-07-16 RX ORDER — OXYCODONE HYDROCHLORIDE 5 MG/1
5 TABLET ORAL
Status: DISCONTINUED | OUTPATIENT
Start: 2024-07-16 | End: 2024-07-16 | Stop reason: HOSPADM

## 2024-07-16 RX ORDER — MIDAZOLAM HYDROCHLORIDE 1 MG/ML
INJECTION INTRAMUSCULAR; INTRAVENOUS PRN
Status: DISCONTINUED | OUTPATIENT
Start: 2024-07-16 | End: 2024-07-16 | Stop reason: SDUPTHER

## 2024-07-16 RX ORDER — TRANEXAMIC ACID 100 MG/ML
INJECTION, SOLUTION INTRAVENOUS PRN
Status: DISCONTINUED | OUTPATIENT
Start: 2024-07-16 | End: 2024-07-16 | Stop reason: SDUPTHER

## 2024-07-16 RX ORDER — LIDOCAINE HYDROCHLORIDE 20 MG/ML
INJECTION, SOLUTION EPIDURAL; INFILTRATION; INTRACAUDAL; PERINEURAL PRN
Status: DISCONTINUED | OUTPATIENT
Start: 2024-07-16 | End: 2024-07-16 | Stop reason: SDUPTHER

## 2024-07-16 RX ORDER — VANCOMYCIN HYDROCHLORIDE 1 G/20ML
INJECTION, POWDER, LYOPHILIZED, FOR SOLUTION INTRAVENOUS
Status: DISCONTINUED
Start: 2024-07-16 | End: 2024-07-16 | Stop reason: HOSPADM

## 2024-07-16 RX ADMIN — FENTANYL CITRATE 50 MCG: 50 INJECTION, SOLUTION INTRAMUSCULAR; INTRAVENOUS at 11:37

## 2024-07-16 RX ADMIN — FENTANYL CITRATE 50 MCG: 50 INJECTION, SOLUTION INTRAMUSCULAR; INTRAVENOUS at 11:19

## 2024-07-16 RX ADMIN — PROPOFOL 140 MG: 10 INJECTION, EMULSION INTRAVENOUS at 10:50

## 2024-07-16 RX ADMIN — ACETAMINOPHEN 650 MG: 325 TABLET ORAL at 14:51

## 2024-07-16 RX ADMIN — TRANEXAMIC ACID 1000 MG: 100 INJECTION, SOLUTION INTRAVENOUS at 11:03

## 2024-07-16 RX ADMIN — SODIUM CHLORIDE: 9 INJECTION, SOLUTION INTRAVENOUS at 14:43

## 2024-07-16 RX ADMIN — Medication 100 MCG: at 11:06

## 2024-07-16 RX ADMIN — Medication 2000 MG: at 10:59

## 2024-07-16 RX ADMIN — CELECOXIB 200 MG: 200 CAPSULE ORAL at 10:06

## 2024-07-16 RX ADMIN — SUGAMMADEX 200 MG: 100 INJECTION, SOLUTION INTRAVENOUS at 11:58

## 2024-07-16 RX ADMIN — LIDOCAINE HYDROCHLORIDE 75 MG: 20 INJECTION, SOLUTION EPIDURAL; INFILTRATION; INTRACAUDAL; PERINEURAL at 10:50

## 2024-07-16 RX ADMIN — MIDAZOLAM 1 MG: 1 INJECTION INTRAMUSCULAR; INTRAVENOUS at 10:48

## 2024-07-16 RX ADMIN — BISACODYL 5 MG: 5 TABLET, COATED ORAL at 14:51

## 2024-07-16 RX ADMIN — FENTANYL CITRATE 25 MCG: 50 INJECTION, SOLUTION INTRAMUSCULAR; INTRAVENOUS at 11:51

## 2024-07-16 RX ADMIN — FENTANYL CITRATE 100 MCG: 50 INJECTION, SOLUTION INTRAMUSCULAR; INTRAVENOUS at 10:50

## 2024-07-16 RX ADMIN — ROCURONIUM BROMIDE 50 MG: 10 INJECTION, SOLUTION INTRAVENOUS at 10:51

## 2024-07-16 RX ADMIN — DEXAMETHASONE SODIUM PHOSPHATE 10 MG: 10 INJECTION, SOLUTION INTRAMUSCULAR; INTRAVENOUS at 10:58

## 2024-07-16 RX ADMIN — ACETAMINOPHEN 1000 MG: 500 TABLET ORAL at 10:05

## 2024-07-16 RX ADMIN — MIDAZOLAM 1 MG: 1 INJECTION INTRAMUSCULAR; INTRAVENOUS at 10:44

## 2024-07-16 RX ADMIN — ONDANSETRON 4 MG: 2 INJECTION INTRAMUSCULAR; INTRAVENOUS at 12:04

## 2024-07-16 RX ADMIN — FENTANYL CITRATE 25 MCG: 50 INJECTION INTRAMUSCULAR; INTRAVENOUS at 12:52

## 2024-07-16 RX ADMIN — SODIUM CHLORIDE, POTASSIUM CHLORIDE, SODIUM LACTATE AND CALCIUM CHLORIDE: 600; 310; 30; 20 INJECTION, SOLUTION INTRAVENOUS at 09:39

## 2024-07-16 RX ADMIN — FENTANYL CITRATE 25 MCG: 50 INJECTION INTRAMUSCULAR; INTRAVENOUS at 13:14

## 2024-07-16 RX ADMIN — TRANEXAMIC ACID 1000 MG: 100 INJECTION, SOLUTION INTRAVENOUS at 12:00

## 2024-07-16 RX ADMIN — EPHEDRINE SULFATE 15 MG: 5 INJECTION INTRAVENOUS at 11:06

## 2024-07-16 RX ADMIN — FENTANYL CITRATE 25 MCG: 50 INJECTION, SOLUTION INTRAMUSCULAR; INTRAVENOUS at 11:58

## 2024-07-16 RX ADMIN — SODIUM CHLORIDE, POTASSIUM CHLORIDE, SODIUM LACTATE AND CALCIUM CHLORIDE: 600; 310; 30; 20 INJECTION, SOLUTION INTRAVENOUS at 12:06

## 2024-07-16 RX ADMIN — EPHEDRINE SULFATE 10 MG: 5 INJECTION INTRAVENOUS at 11:02

## 2024-07-16 ASSESSMENT — PAIN SCALES - GENERAL
PAINLEVEL_OUTOF10: 4
PAINLEVEL_OUTOF10: 6
PAINLEVEL_OUTOF10: 6
PAINLEVEL_OUTOF10: 0

## 2024-07-16 ASSESSMENT — PAIN - FUNCTIONAL ASSESSMENT: PAIN_FUNCTIONAL_ASSESSMENT: 0-10

## 2024-07-16 ASSESSMENT — PAIN DESCRIPTION - LOCATION
LOCATION: HIP

## 2024-07-16 ASSESSMENT — ENCOUNTER SYMPTOMS: SHORTNESS OF BREATH: 0

## 2024-07-16 ASSESSMENT — PAIN DESCRIPTION - PAIN TYPE
TYPE: SURGICAL PAIN
TYPE: SURGICAL PAIN

## 2024-07-16 ASSESSMENT — PAIN DESCRIPTION - DESCRIPTORS
DESCRIPTORS: ACHING
DESCRIPTORS: ACHING
DESCRIPTORS: CRUSHING

## 2024-07-16 ASSESSMENT — PAIN DESCRIPTION - FREQUENCY
FREQUENCY: CONTINUOUS
FREQUENCY: CONTINUOUS

## 2024-07-16 ASSESSMENT — PAIN DESCRIPTION - ORIENTATION
ORIENTATION: LEFT

## 2024-07-16 NOTE — PLAN OF CARE
PROTOCOLS  NURSING IMPLEMENTED    TOTAL JOINT DVT/PE  VENOUS THROMBOEMBOLISM PROPHYLAXIS  (Nursing Automatically Implement)    Terra Hills  [id number]  7/16/24    YES DVT RISK FACTOR SCORE YES MAJOR BLEEDING RISK FACTORS SCORE     [x] 50 years old or greater (1)   [] Hx. Easy Bleeding (1)      [] Heart failure (2)   [] NSAID Use in Last 5 Days (2)      [] Varicose veins - Hx. (1)   [] Gastrointestinal or Genitourinary bleeding in Last 14 Days (2)      [] Myocardial Infarction - Hx. (1)         [] Cancer - Hx. (2)         [] Atrial fibrillation - Hx. (1)         [] Ischemic Stroke - Hx. (1)         [x] Diabetes Mellitus - Hx. (1)         [] Previous DVT/PE - Hx. (2)         [] Hormone Replacement Therapy (1)         [x] Obesity (1)         [] Paralysis (1)         [] Pregnancy (1)         [] Smoking (1)                   [] Thromophilia (1)   []   Mild to Moderate Bleeding (2)      [x] Total Hip Arthroplasty (1)   [] Active Bleeding (4)      [] Family history of PE or DVT? (4) (Consider the following labs to test for presence of inhibitor deficiency state:) Factor V Leiden, Prothrombin Gene Mutation, Protein S Deficiency, Protien C Deficiency, Antithrombin Deficiency   [] Malignant Hypertension (2)        [] Thrombocytopenia 20k to 100k (2)        [] Thrombocytopenia less than 20k (4)        [] Bleeding Diathesis (4)        [] \"Bloody Stick\" Epidural or Spinal (2)     TOTAL DVT SCORE   TOTAL BLEEDING SCORE 0     [] CLASS A   Standard Risk DVT (0-3)    [x] CLASS X Standard Risk Bleeding (0-4)      [x] CLASS B Elevated Risk DVT (greater than 3)    [] CLASS Y High Risk Bleeding (greater than 4)     FINAL MATRIX (e.g. AY)       *If allergic to ASA use Warfarin  *BY patient consider no treatment  **Consider venous filter with high risk PE  **If on Coumadin pre-op, then restart night of surgery      []  DVT Prophylaxis: Class AX, AY    Ecotrin 81 mg by mouth BID starting day of surgery for 6 weeks for all total  joints.

## 2024-07-16 NOTE — H&P
(IMITREX) 50 MG tablet Take 1 tablet by mouth once as needed for Migraine May repeat an additional dose if needed after 2 hours 11/30/21 7/16/24  Desirae Calhoun DO   Omega-3 Fatty Acids (FISH OIL PO) Take by mouth daily    ProviderSherie MD   Multiple Vitamins-Minerals (PRESERVISION AREDS 2 PO) Take 2 tablets by mouth daily    ProviderSherie MD         This is a 74 y.o. female who is pleasant, cooperative, alert and oriented x3, in no acute distress.    Heart: Heart sounds are normal. HR 60 regular rate and rhythm without murmur, gallop or rub.   Lungs: Normal respiratory effort with equal expansion, good air exchange, unlabored and clear to auscultation without wheezes or rales bilaterally   Abdomen: soft, nontender, nondistended with bowel sounds active.   Extremities: pedal pulses palpable with no pedal edema or calf tenderness bilaterally.    Labs:  No results for input(s): \"HGB\", \"HCT\", \"WBC\", \"MCV\", \"PLT\", \"NA\", \"K\", \"CL\", \"CO2\", \"BUN\", \"CREATININE\", \"GLUCOSE\", \"INR\", \"PROTIME\", \"APTT\", \"AST\", \"ALT\", \"LABALBU\", \"HCG\" in the last 720 hours.    No results for input(s): \"COVID19\" in the last 720 hours.    REBECCA Marcum CNP  Electronically signed 7/16/2024 at 10:26 AM    History and Physical Service   [facility]    HISTORY AND PHYSICAL EXAMINATION            Date of Evaluation: 6/27/2024  Patient name:  Terra Hills  MRN:   [id number]  YOB: 1950  PCP:    Desirae Calhoun DO    History Obtained From:     Patient, medical records    History of Present Illness:     This is Terra Hills a 74 y.o. female who presents for a pre-admission testing appointment for an upcoming HIP TOTAL ARTHROPLASTY ANTERIOR APPROACH by Nathaniel Estrella DO scheduled on 07/16/2024 at 1115 due to Osteoarthritis of left hip. The patient's chief complaint is 8-9/10 left hip pain which has progressively worsened over the past 7 years. Left hip pain is aggravated

## 2024-07-16 NOTE — OP NOTE
Operative Note      Patient: Terra Hills  YOB: 1950  MRN: [id number]    Date of Procedure: 7/16/2024    Pre-Op Diagnosis Codes:     * Arthritis of left hip [M16.12]    Post-Op Diagnosis: Left hip arthritis       Procedure(s):  HIP TOTAL ARTHROPLASTY ANTERIOR APPROACH, left    Surgeon(s):  Nathaniel Estrella DO    Assistant:   Resident: David Jean Baptiste MD; Mark Phelan DO    Anesthesia: General    Estimated Blood Loss (mL): 400    Complications: None    Specimens:   * No specimens in log *    Implants:  Implant Name Type Inv. Item Serial No.  Lot No. LRB No. Used Action   SHELL ACET OD52MM LNR DMF DBL MOBILITY MPACT - [id number]  SHELL ACET OD52MM LNR DMF DBL MOBILITY MPACT  [employer] [id number] Left 1 Implanted   STEM FEM SZ 7 LAT HIP MECTAGRIP MASTERLOC - [id number]  STEM FEM SZ 7 LAT HIP MECTAGRIP MASTERLOC  [employer] [id number] Left 1 Implanted   LINER ACET SZ DMF OD52MM ID28MM BLK HIP HIGHCROSS DBL - [id number]  LINER ACET SZ DMF OD52MM ID28MM BLK HIP HIGHCROSS DBL  [employer] [id number] Left 1 Implanted   HEAD FEM SM [id number] HIP CO CHROM AMISTEM - [id number]  HEAD FEM SM [id number] HIP CO CHROM AMISTEM  [employer] [id number] Left 1 Implanted         Drains: * No LDAs found *    Findings:  Infection Present At Time Of Surgery (PATOS) (choose all levels that have infection present):  No infection present  Other Findings: Left hip severe degenerative joint disease    Detailed Description of Procedure:                      NARRATIVE SUMMARY:  The patient is a 74-year-old female who presented to  [facility] for left total hip  arthroplasty.  The patient has had progressively worsening left hip  pain, which has failed to improve despite conservative therapy to  include activity modification.  Her symptoms are greatly affecting her  usual activities of daily living, and as a result, she has indicated that she  would like surgical intervention

## 2024-07-16 NOTE — ANESTHESIA PRE PROCEDURE
Department of Anesthesiology  Preprocedure Note       Name:  Terra Hills   Age:  74 y.o.  :  1950                                          MRN:  [id number]         Date:  2024      Surgeon: Surgeon(s):  Nathaniel Estrella DO    Procedure: Procedure(s):  HIP TOTAL ARTHROPLASTY ANTERIOR APPROACH    Medications prior to admission:   Prior to Admission medications    Medication Sig Start Date End Date Taking? Authorizing Provider   Semaglutide, 1 MG/DOSE, (OZEMPIC) 4 MG/3ML SOPN sc injection Inject 0.33 ml (1.2 mg) once weekly  Patient taking differently: Inject 1.5 mg into the skin every 7 days Inject 0.33 ml (1.2 mg) once weekly-    Desirae Calhoun,    metoprolol tartrate (LOPRESSOR) 50 MG tablet TAKE 1 TABLET BY MOUTH DAILY 6/3/24   Desirae Calhoun,    rosuvastatin (CRESTOR) 20 MG tablet TAKE 1 TABLET BY MOUTH AT  BEDTIME 6/3/24   Desirae Calhoun,    meloxicam (MOBIC) 15 MG tablet TAKE 1 TABLET BY MOUTH DAILY 3/22/24   Desirae Calhoun,    lisinopril (PRINIVIL;ZESTRIL) 20 MG tablet TAKE 1 TABLET BY MOUTH DAILY 2/15/24   Desirae Calhoun,    amLODIPine (NORVASC) 5 MG tablet TAKE 1 TABLET BY MOUTH TWICE A DAY 24   Desirae Calhoun,    hydroCHLOROthiazide (HYDRODIURIL) 25 MG tablet Take 1 tablet by mouth every morning 23   Desirae Calhoun,    Respiratory Therapy Supplies MISC Bip[employer] supplies 22   Desirae Calhoun DO   SUMAtriptan (IMITREX) 50 MG tablet Take 1 tablet by mouth once as needed for Migraine May repeat an additional dose if needed after 2 hours 21  Desirae Calhoun, DO   Omega-3 Fatty Acids (FISH OIL PO) Take by mouth daily    Sherie Lopez MD   Multiple Vitamins-Minerals (PRESERVISION AREDS 2 PO) Take 2 tablets by mouth daily    Sherie Lopez MD       Current medications:    Current Facility-Administered Medications   Medication Dose Route Frequency Provider Last Rate Last Admin   • ceFAZolin

## 2024-07-16 NOTE — ANESTHESIA POSTPROCEDURE EVALUATION
Department of Anesthesiology  Postprocedure Note    Patient: Terra Hills  MRN: [id number]  YOB: 1950  Date of evaluation: 7/16/2024    Procedure Summary       Date: 07/16/24 Room / Location: [address]    Anesthesia Start: 1044 Anesthesia Stop: 1235    Procedure: HIP TOTAL ARTHROPLASTY ANTERIOR APPROACH (Left: Hip) Diagnosis:       Arthritis of left hip      (Arthritis of left hip [M16.12])    Surgeons: Nathaniel Estrella DO Responsible Provider: Khadijah Desai MD    Anesthesia Type: General ASA Status: 3            Anesthesia Type: General    Michelle Phase I: Michelle Score: 9    Michelle Phase II:      Anesthesia Post Evaluation    Cardiovascular status: hemodynamically stable    No notable events documented.

## 2024-07-16 NOTE — DISCHARGE INSTRUCTIONS
pool, or hot tub until your incision is fully closed and any drains are removed.  Don’t scrub, pick, scratch, or pull at your incision.  Don’t use oils, lotions, or creams on your incision unless your healthcare provider approves it.    Follow-up  You will have one or more follow-up visits with your healthcare provider. These are needed to check how well you’re healing. Your drain, stitches, or staples may also be removed during these visits. Do not miss your follow-up visit, even if you are feeling better.      Call your healthcare provider right away if you have the following:  Fever of 100.4°F (38°C) or higher, or as advised by your healthcare provider.  Chest pain or trouble breathing.  Pain or tenderness in your leg(s).  Increased pain, redness, swelling, bleeding, or foul-smelling drainage at the incision site.  Incision changes, separates or is hot to the touch.  Problems with the drain if you have one.  Itchy, swollen skin; skin rash.    Medicines, Diet, and Activity:   Refer to your discharge paperwork for further instructions

## 2024-07-16 NOTE — BRIEF OP NOTE
Brief Postoperative Note      Patient: Terra Hills  YOB: 1950  MRN: [id number]    Date of Procedure: 7/16/2024    Pre-Op Diagnosis Codes:     * Arthritis of left hip [M16.12]    Post-Op Diagnosis: Left hip arthritis       Procedure(s):  HIP TOTAL ARTHROPLASTY ANTERIOR APPROACH, left    Surgeon(s):  Nathaniel Estrella DO    Assistant:  Resident: David Jean Baptiste MD; Mark Phelan DO    Anesthesia: General    Estimated Blood Loss (mL): 400    Complications: None    Specimens:   * No specimens in log *    Implants:  Implant Name Type Inv. Item Serial No.  Lot No. LRB No. Used Action   SHELL ACET OD52MM LNR DMF DBL MOBILITY MPACT - [id number]  SHELL ACET OD52MM LNR DMF DBL MOBILITY MPACT  [employer] [facility]- [id number] Left 1 Implanted   STEM FEM SZ 7 LAT HIP MECTAGRIP MASTERLOC - [id number]  STEM FEM SZ 7 LAT HIP MECTAGRIP MASTERLOC  [employer] [facility]-WD [id number] Left 1 Implanted   LINER ACET SZ DMF OD52MM ID28MM BLK HIP HIGHCROSS DBL - [id number]  LINER ACET SZ DMF OD52MM ID28MM BLK HIP HIGHCROSS DBL  [employer] [id number] Left 1 Implanted   HEAD FEM SM [id number] HIP CO CHROM AMISTEM - [id number]  HEAD FEM SM [id number] HIP CO CHROM AMISTEM  [employer] [id number] Left 1 Implanted         Drains: * No LDAs found *    Findings:  Infection Present At Time Of Surgery (PATOS) (choose all levels that have infection present):  No infection present  Other Findings: Left hip severe degenerative joint disease    Electronically signed by Nathaniel Estrella DO on 7/16/2024 at 11:56 AM

## 2024-07-16 NOTE — PROGRESS NOTES
Occupational Therapy  Facility/Department: [facility]  Occupational Therapy Initial Assessment    Name: Terra Hills  : 1950  MRN: [id number]  Date of Service: 2024    [facility] 2024             Patient Diagnosis(es): The encounter diagnosis was Post-op pain.  Past Medical History:  has a past medical history of Abnormal liver function tests, Hyperlipidemia, Hypertension, Insulin resistance, Macular degeneration of left eye, Obstructive sleep apnea, Osteoarthritis, and Thyroid nodule.  Past Surgical History:  has a past surgical history that includes hysteroscopy (2005); other surgical history (2005); Abdominal exploration surgery (1994); and Ovary removal.           Assessment   Performance deficits / Impairments: Decreased functional mobility ;Decreased ADL status;Decreased balance  Assessment: pt is safe for d/c, same day ortho. Goals met for safe mobiltiy and ADL completion.  Prognosis: Good  Decision Making: Medium Complexity  REQUIRES OT FOLLOW-UP: No  Activity Tolerance  Activity Tolerance: Patient Tolerated treatment well        Plan   Occupational Therapy Plan  Times Per Week: 1 visit with nicole  Current Treatment Recommendations: Self-Care / ADL, Equipment evaluation, education, & procurement, Safety education & training, Patient/Caregiver education & training     Restrictions  Restrictions/Precautions  Restrictions/Precautions: Weight Bearing, Surgical Protocols, Fall Risk, General Precautions  Lower Extremity Weight Bearing Restrictions  Left Lower Extremity Weight Bearing: Weight Bearing As Tolerated  Position Activity Restriction  Hip Precautions: Anterior hip precautions (No SLR)  Other position/activity restrictions: NO SLR, WBAT, B thigh high stockings    Subjective   General  Chart Reviewed: Yes  Patient assessed for rehabilitation services?: Yes  Family / Caregiver Present: Yes  General Comment  Comments: RN reports patient is medically stable for therapy 
Orthopedic Coordinator Note    Patient s/p left total Hip replacement on 07/16/2024 WITH DR. CARROLL.    The following appointments are currently scheduled:    Post-op with surgeon 07/30/2024 AT 1000 IN [place] WITH JAVI CARMONA.    Physical Therapy OPPT [place] 07/19/2024 AT 1030.    PT HAS A FWW.    DVT Prophylaxis: 2.5MG ELIQUIS BID X 35 DAYS POSTOP. PT TO START ELIQIUS POD1 ON 07/17/2024.    PT CAME TO JOINT CLASS 06/27/2024.    PT IS A SDD.      Any questions please contact Ata Coates RN, MSN  [phone]      Electronically signed by: ATA COATES RN on 7/16/2024 at 10:41 AM     
%  O2 Device: None (Room air)  BP: 127/67  MAP (Calculated): 87  BP Location: Right upper arm  BP Method: Automatic  Patient Position: Sitting     Observation/Palpation  Posture: Good  Observation: pt lying in bed, motivated to go home. Pain manageable. Denies N/T        AROM RLE (degrees)  RLE AROM: WNL  AROM LLE (degrees)  LLE General AROM: heel slide to 60 degrees hip/knee flexion; DF  WFL  Strength RLE  Strength RLE: WFL  Strength LLE  Comment: + LAQ; DF 4+  Strength Other  Other: See OT nicole for UE ROM/strength assessment           Bed mobility  Supine to Sit: Minimal assistance (used sheet as leg - good demo)  Transfers  Sit to Stand: Contact guard assistance  Stand to Sit: Contact guard assistance  Ambulation  Surface: Level tile  Device: Rolling Walker  Assistance: Contact guard assistance;Stand by assistance  Quality of Gait: Once standing safely at bedside with RW, took pt. through pre-gait sequence to ensure quad control prior to ambulation.  Assessed control of knee during static standing, wt. shifts and steps in place. Pt. With good quad control and able to proceed with ambulation.  Excellent demo step - to gait with RW. Cues to look ahead, not at floor.  Distance: 20ft, 40ft, 5ft, 20ft, 10ft  Comments: Pt. educated on walker safety including safe hand pacement for sit to stand transfers, ambulating and turning \"inside\" RW, step-to gait pattern and how/when to progress to step-through, approaching counters in RW and how to reach while using RW and how to approach chair/toilet/car/bed fully before turning to sit to avoid backwards stepping. Finally, implementing two safety checks (feeling with legs, then hands) prior to sitting. Pt. with good demo and understanding.  Stairs/Curb  Stairs?: Yes  Stairs  # Steps : 5  Stairs Height: 6\"  Rails: Bilateral  Curbs: 8\"  Device: No Device  Assistance: Contact guard assistance  Comment: Pt. with excellent demo stairs after initial instruction.

## 2024-07-26 ENCOUNTER — TELEPHONE (OUTPATIENT)
Dept: FAMILY MEDICINE CLINIC | Age: 74
End: 2024-07-26

## 2024-07-26 DIAGNOSIS — G47.33 OSA (OBSTRUCTIVE SLEEP APNEA): Primary | ICD-10-CM

## 2024-07-26 NOTE — TELEPHONE ENCOUNTER
Patient needing a new script for her BIPAP sent to [facility] fax [phone]. Her current BIPAP keeps giving her an error code stating out of life. She believes the last time she had a new one was 1/2017.

## 2024-07-30 ENCOUNTER — OFFICE VISIT (OUTPATIENT)
Dept: ORTHOPEDIC SURGERY | Age: 74
End: 2024-07-30

## 2024-07-30 VITALS — BODY MASS INDEX: 34.55 KG/M2 | WEIGHT: 195 LBS | RESPIRATION RATE: 14 BRPM | HEIGHT: 63 IN

## 2024-07-30 DIAGNOSIS — Z96.642 S/P TOTAL LEFT HIP ARTHROPLASTY: Primary | ICD-10-CM

## 2024-07-30 PROCEDURE — 99024 POSTOP FOLLOW-UP VISIT: CPT | Performed by: PHYSICIAN ASSISTANT

## 2024-07-30 NOTE — PROGRESS NOTES
should call our office to come in for evaluation ASAP>    We recommended that the patient does not drive until 6 weeks after surgery and when her reaction time is adequate without hesitation.    The patient may continue taking prescribed pain medication and beginning to decrease the number of doses per day and bridge with Tylenol as needed. The patient was instructed to Eliquis 2.5mg, 1 tab twice daily for the next 2 weeks for post-operative DVT prophylaxis. After that she should transition 81mg ASA twice daily for 2 more weeks (4-6 weeks post op). The patient will follow-up in 2 weeks for an incision check or sooner if needed.  The patient was instructed to call the office with any questions or concerns.  The patient voiced their understanding.         Follow up: Return in about 2 weeks (around 8/13/2024) for re-evaluation.    No orders of the defined types were placed in this encounter.      Orders Placed This Encounter   Procedures    XR HIP 2-3 VW W PELVIS LEFT     Standing Status:   Future     Number of Occurrences:   1     Standing Expiration Date:   7/25/2025       This note is created with the assistance of a speech recognition program.  While intending to generate a document that actually reflects the content of the visit, the document can still have some errors including those of syntax and sound a like substitutions which may escape proof reading.  In such instances, actual meaning can be extrapolated by contextual diversion.     Electronically signed by Cindi Doherty PA-C on 7/31/2024 at 2:42 PM

## 2024-07-31 ASSESSMENT — ENCOUNTER SYMPTOMS
COUGH: 0
VOMITING: 0
SHORTNESS OF BREATH: 0
COLOR CHANGE: 0

## 2024-08-13 ENCOUNTER — OFFICE VISIT (OUTPATIENT)
Dept: ORTHOPEDIC SURGERY | Age: 74
End: 2024-08-13

## 2024-08-13 VITALS — HEIGHT: 63 IN | WEIGHT: 195.11 LBS | BODY MASS INDEX: 34.57 KG/M2

## 2024-08-13 DIAGNOSIS — Z96.642 S/P TOTAL LEFT HIP ARTHROPLASTY: Primary | ICD-10-CM

## 2024-08-13 PROCEDURE — 99024 POSTOP FOLLOW-UP VISIT: CPT | Performed by: PHYSICIAN ASSISTANT

## 2024-08-13 RX ORDER — AMOXICILLIN 500 MG/1
CAPSULE ORAL
Qty: 4 CAPSULE | Refills: 0 | Status: SHIPPED | OUTPATIENT
Start: 2024-08-13

## 2024-08-13 NOTE — PROGRESS NOTES
[facility]ICINE  [address] [facility]  [address] [place], [place] [address]  Dept: [phone]  Dept Fax: [phone]        Postoperative follow-up note    Subjective:   Terra Hills is a 74 y.o. year old female who presents to our office today for postoperative followup regarding her   1. S/P total left hip arthroplasty        Chief Complaint   Patient presents with    Post-Op Check     Left BOSSMAN. DOS: 7/16/24       Date of Surgery: 7/16/2024  4 weeks post-op    Terra Hills  is a 74 y.o. year old female who presents to our office today for postoperative follow up after having undergone a Left total hip arthroplasty on 7/16/2024 with Dr Nathaniel Estrella DO. The patient is 4 weeks post-operative.She was last evaluated on 7/30/2024. The patient notes that she is continuing to improve in regards to the Left Hip. She is here today for an incision check. At the last appointment she had a small area on the proximal aspect of the incision that has a superficial dehiscence and she was encouraged to monitor the area and use a ABD or gauze pad along the proximal incision to keep her abdominal pannus from resting on the incision.    She notes that it has improved and \"looks healed\". She continues to use a walker for long distances, but is able to use a cane while at home. The patient is on Eliquis for post-op DVT prophylaxis and will complete the prescription next week.     The patient notes that she does have pain along the top of the thigh especially at night. She notes she has been taking Tylenol. She is still in PT in [place].     The patient is accompanied by her son today.  The patient notes that she has a chip in her front tooth and is wondering if       Review of Systems   Constitutional:  Negative for activity change and fever.   HENT:  Negative for sneezing.    Respiratory:  Negative for cough and shortness of

## 2024-08-15 ASSESSMENT — ENCOUNTER SYMPTOMS
COLOR CHANGE: 0
VOMITING: 0
COUGH: 0
SHORTNESS OF BREATH: 0

## 2024-08-26 RX ORDER — AMLODIPINE BESYLATE 5 MG/1
5 TABLET ORAL 2 TIMES DAILY
Qty: 180 TABLET | Refills: 1 | Status: SHIPPED | OUTPATIENT
Start: 2024-08-26

## 2024-09-10 ENCOUNTER — HOSPITAL ENCOUNTER (OUTPATIENT)
Age: 74
Discharge: HOME | End: 2024-09-10
Payer: MEDICARE

## 2024-09-10 DIAGNOSIS — E78.2 MIXED HYPERLIPIDEMIA: ICD-10-CM

## 2024-09-10 DIAGNOSIS — R73.01 IMPAIRED FASTING BLOOD SUGAR: ICD-10-CM

## 2024-09-10 DIAGNOSIS — I10 ESSENTIAL HYPERTENSION: ICD-10-CM

## 2024-09-10 LAB
ALBUMIN SERPL-MCNC: 3.9 G/DL (ref 3.5–5.2)
ALBUMIN/GLOB SERPL: 1.6 {RATIO} (ref 1–2.5)
ALP SERPL-CCNC: 77 U/L (ref 35–104)
ALT SERPL-CCNC: 14 U/L (ref 5–33)
ANION GAP SERPL CALCULATED.3IONS-SCNC: 8 MMOL/L (ref 9–17)
AST SERPL-CCNC: 16 U/L
BASOPHILS # BLD: <0.03 K/UL (ref 0–0.2)
BASOPHILS NFR BLD: 1 % (ref 0–2)
BILIRUB SERPL-MCNC: 0.3 MG/DL (ref 0.3–1.2)
BUN SERPL-MCNC: 16 MG/DL (ref 8–23)
BUN/CREAT SERPL: 23 (ref 9–20)
CALCIUM SERPL-MCNC: 9.5 MG/DL (ref 8.6–10.4)
CHLORIDE SERPL-SCNC: 107 MMOL/L (ref 98–107)
CHOLEST SERPL-MCNC: 157 MG/DL (ref 0–199)
CHOLESTEROL/HDL RATIO: 4
CO2 SERPL-SCNC: 28 MMOL/L (ref 20–31)
CREAT SERPL-MCNC: 0.7 MG/DL (ref 0.5–0.9)
EOSINOPHIL # BLD: 0.05 K/UL (ref 0–0.44)
EOSINOPHILS RELATIVE PERCENT: 1 % (ref 1–4)
ERYTHROCYTE [DISTWIDTH] IN BLOOD BY AUTOMATED COUNT: 13.7 % (ref 11.8–14.4)
EST. AVERAGE GLUCOSE BLD GHB EST-MCNC: 100 MG/DL
GFR, ESTIMATED: >90 ML/MIN/1.73M2
GLUCOSE SERPL-MCNC: 101 MG/DL (ref 70–99)
HBA1C MFR BLD: 5.1 % (ref 4–6)
HCT VFR BLD AUTO: 39.5 % (ref 36.3–47.1)
HDLC SERPL-MCNC: 37 MG/DL
HGB BLD-MCNC: 13.1 G/DL (ref 11.9–15.1)
IMM GRANULOCYTES # BLD AUTO: <0.03 K/UL (ref 0–0.3)
IMM GRANULOCYTES NFR BLD: 0 %
LDLC SERPL CALC-MCNC: 80 MG/DL (ref 0–100)
LYMPHOCYTES NFR BLD: 0.8 K/UL (ref 1.1–3.7)
LYMPHOCYTES RELATIVE PERCENT: 23 % (ref 24–43)
MCH RBC QN AUTO: 28.9 PG (ref 25.2–33.5)
MCHC RBC AUTO-ENTMCNC: 33.2 G/DL (ref 25.2–33.5)
MCV RBC AUTO: 87 FL (ref 82.6–102.9)
MONOCYTES NFR BLD: 0.32 K/UL (ref 0.1–1.2)
MONOCYTES NFR BLD: 9 % (ref 3–12)
NEUTROPHILS NFR BLD: 66 % (ref 36–65)
NEUTS SEG NFR BLD: 2.31 K/UL (ref 1.5–8.1)
NRBC BLD-RTO: 0 PER 100 WBC
PLATELET # BLD AUTO: 134 K/UL (ref 138–453)
PMV BLD AUTO: 10.5 FL (ref 8.1–13.5)
POTASSIUM SERPL-SCNC: 4.1 MMOL/L (ref 3.7–5.3)
PROT SERPL-MCNC: 6.4 G/DL (ref 6.4–8.3)
RBC # BLD AUTO: 4.54 M/UL (ref 3.95–5.11)
SODIUM SERPL-SCNC: 143 MMOL/L (ref 135–144)
TRIGL SERPL-MCNC: 198 MG/DL
VLDLC SERPL CALC-MCNC: 40 MG/DL
WBC OTHER # BLD: 3.5 K/UL (ref 3.5–11.3)

## 2024-09-10 PROCEDURE — 85025 COMPLETE CBC W/AUTO DIFF WBC: CPT

## 2024-09-10 PROCEDURE — 80061 LIPID PANEL: CPT

## 2024-09-10 PROCEDURE — 36415 COLL VENOUS BLD VENIPUNCTURE: CPT

## 2024-09-10 PROCEDURE — 83036 HEMOGLOBIN GLYCOSYLATED A1C: CPT

## 2024-09-10 PROCEDURE — 80053 COMPREHEN METABOLIC PANEL: CPT

## 2024-09-11 ENCOUNTER — OFFICE VISIT (OUTPATIENT)
Dept: FAMILY MEDICINE CLINIC | Age: 74
End: 2024-09-11
Payer: MEDICARE

## 2024-09-11 VITALS
TEMPERATURE: 98 F | OXYGEN SATURATION: 96 % | HEIGHT: 63 IN | WEIGHT: 193 LBS | BODY MASS INDEX: 34.2 KG/M2 | SYSTOLIC BLOOD PRESSURE: 110 MMHG | RESPIRATION RATE: 18 BRPM | HEART RATE: 68 BPM | DIASTOLIC BLOOD PRESSURE: 70 MMHG

## 2024-09-11 DIAGNOSIS — I10 ESSENTIAL HYPERTENSION: Primary | ICD-10-CM

## 2024-09-11 DIAGNOSIS — M54.16 LUMBAR RADICULOPATHY: ICD-10-CM

## 2024-09-11 DIAGNOSIS — G47.33 OSA (OBSTRUCTIVE SLEEP APNEA): ICD-10-CM

## 2024-09-11 DIAGNOSIS — M51.36 DEGENERATIVE DISC DISEASE, LUMBAR: ICD-10-CM

## 2024-09-11 DIAGNOSIS — R73.01 IMPAIRED FASTING BLOOD SUGAR: ICD-10-CM

## 2024-09-11 DIAGNOSIS — E78.2 MIXED HYPERLIPIDEMIA: ICD-10-CM

## 2024-09-11 DIAGNOSIS — F40.240 CLAUSTROPHOBIA: ICD-10-CM

## 2024-09-11 DIAGNOSIS — Z12.31 ENCOUNTER FOR SCREENING MAMMOGRAM FOR MALIGNANT NEOPLASM OF BREAST: ICD-10-CM

## 2024-09-11 PROCEDURE — G8417 CALC BMI ABV UP PARAM F/U: HCPCS | Performed by: FAMILY MEDICINE

## 2024-09-11 PROCEDURE — 3078F DIAST BP <80 MM HG: CPT | Performed by: FAMILY MEDICINE

## 2024-09-11 PROCEDURE — 3017F COLORECTAL CA SCREEN DOC REV: CPT | Performed by: FAMILY MEDICINE

## 2024-09-11 PROCEDURE — G8427 DOCREV CUR MEDS BY ELIG CLIN: HCPCS | Performed by: FAMILY MEDICINE

## 2024-09-11 PROCEDURE — G8400 PT W/DXA NO RESULTS DOC: HCPCS | Performed by: FAMILY MEDICINE

## 2024-09-11 PROCEDURE — 1090F PRES/ABSN URINE INCON ASSESS: CPT | Performed by: FAMILY MEDICINE

## 2024-09-11 PROCEDURE — 99214 OFFICE O/P EST MOD 30 MIN: CPT | Performed by: FAMILY MEDICINE

## 2024-09-11 PROCEDURE — 3074F SYST BP LT 130 MM HG: CPT | Performed by: FAMILY MEDICINE

## 2024-09-11 PROCEDURE — G2211 COMPLEX E/M VISIT ADD ON: HCPCS | Performed by: FAMILY MEDICINE

## 2024-09-11 PROCEDURE — 1123F ACP DISCUSS/DSCN MKR DOCD: CPT | Performed by: FAMILY MEDICINE

## 2024-09-11 PROCEDURE — 1036F TOBACCO NON-USER: CPT | Performed by: FAMILY MEDICINE

## 2024-09-11 RX ORDER — DIAZEPAM 5 MG
5 TABLET ORAL ONCE
Qty: 1 TABLET | Refills: 0 | Status: SHIPPED | OUTPATIENT
Start: 2024-09-11 | End: 2024-09-11

## 2024-09-16 ASSESSMENT — ENCOUNTER SYMPTOMS: BACK PAIN: 1

## 2024-09-24 ENCOUNTER — OFFICE VISIT (OUTPATIENT)
Dept: ORTHOPEDIC SURGERY | Age: 74
End: 2024-09-24

## 2024-09-24 VITALS — HEIGHT: 63 IN | BODY MASS INDEX: 34.38 KG/M2 | WEIGHT: 194 LBS

## 2024-09-24 DIAGNOSIS — Z96.642 S/P TOTAL LEFT HIP ARTHROPLASTY: Primary | ICD-10-CM

## 2024-09-24 DIAGNOSIS — M25.552 LATERAL PAIN OF LEFT HIP: ICD-10-CM

## 2024-09-24 PROCEDURE — 99024 POSTOP FOLLOW-UP VISIT: CPT | Performed by: PHYSICIAN ASSISTANT

## 2024-09-26 ENCOUNTER — HOSPITAL ENCOUNTER (OUTPATIENT)
Dept: MRI IMAGING | Age: 74
Discharge: HOME | End: 2024-09-28
Attending: FAMILY MEDICINE
Payer: MEDICARE

## 2024-09-26 DIAGNOSIS — M54.16 LUMBAR RADICULOPATHY: ICD-10-CM

## 2024-09-26 DIAGNOSIS — M51.36 DEGENERATIVE DISC DISEASE, LUMBAR: ICD-10-CM

## 2024-09-26 PROCEDURE — 72148 MRI LUMBAR SPINE W/O DYE: CPT

## 2024-09-26 ASSESSMENT — ENCOUNTER SYMPTOMS
VOMITING: 0
COUGH: 0
COLOR CHANGE: 0
SHORTNESS OF BREATH: 0

## 2024-10-01 DIAGNOSIS — M54.16 LUMBAR RADICULOPATHY: Primary | ICD-10-CM

## 2024-10-01 DIAGNOSIS — M51.362 DEGENERATION OF INTERVERTEBRAL DISC OF LUMBAR REGION WITH DISCOGENIC BACK PAIN AND LOWER EXTREMITY PAIN: ICD-10-CM

## 2024-10-07 RX ORDER — HYDROCHLOROTHIAZIDE 25 MG/1
25 TABLET ORAL EVERY MORNING
Qty: 90 TABLET | Refills: 1 | Status: SHIPPED | OUTPATIENT
Start: 2024-10-07

## 2024-10-09 DIAGNOSIS — M51.361 DEGENERATION OF INTERVERTEBRAL DISC OF LUMBAR REGION WITH LOWER EXTREMITY PAIN: ICD-10-CM

## 2024-10-09 DIAGNOSIS — M54.16 LUMBAR RADICULOPATHY: Primary | ICD-10-CM

## 2024-10-09 NOTE — PROGRESS NOTES
Patient called stating she seen Dr Patrick and he wants her to see orthopedic surgeon prior to doing any procedures. Referral placed for patient to see Dr Zaidi

## 2024-10-31 ENCOUNTER — OFFICE VISIT (OUTPATIENT)
Dept: ORTHOPEDIC SURGERY | Age: 74
End: 2024-10-31

## 2024-10-31 VITALS — BODY MASS INDEX: 35.97 KG/M2 | WEIGHT: 203 LBS | HEIGHT: 63 IN | RESPIRATION RATE: 14 BRPM

## 2024-10-31 DIAGNOSIS — Z96.642 S/P TOTAL LEFT HIP ARTHROPLASTY: Primary | ICD-10-CM

## 2024-10-31 DIAGNOSIS — M70.62 GREATER TROCHANTERIC BURSITIS OF LEFT HIP: ICD-10-CM

## 2024-10-31 RX ORDER — METHYLPREDNISOLONE 4 MG/1
TABLET ORAL
Qty: 1 KIT | Refills: 0 | Status: SHIPPED | OUTPATIENT
Start: 2024-10-31

## 2024-11-01 ENCOUNTER — TELEPHONE (OUTPATIENT)
Dept: FAMILY MEDICINE CLINIC | Age: 74
End: 2024-11-01

## 2024-11-01 DIAGNOSIS — I10 ESSENTIAL HYPERTENSION: Primary | ICD-10-CM

## 2024-11-01 NOTE — TELEPHONE ENCOUNTER
Scheduled patient an appt. Waiting for Airam to call back to see what all testing patient needs done prior to appt.

## 2024-11-01 NOTE — TELEPHONE ENCOUNTER
Airam from Dr. Wheat office.  Patient is scheduled for a lumbar fusion on 11/11/24 and will need pre op appointment.   No appointments available for writer to book.   Please call the patient and advise of pre op appointment.

## 2024-11-04 ENCOUNTER — HOSPITAL ENCOUNTER (OUTPATIENT)
Age: 74
Discharge: HOME | End: 2024-11-04
Payer: MEDICARE

## 2024-11-04 DIAGNOSIS — I10 ESSENTIAL HYPERTENSION: ICD-10-CM

## 2024-11-04 LAB
ALBUMIN SERPL-MCNC: 4.3 G/DL (ref 3.5–5.2)
ALBUMIN/GLOB SERPL: 1.4 {RATIO} (ref 1–2.5)
ALP SERPL-CCNC: 80 U/L (ref 35–104)
ALT SERPL-CCNC: 16 U/L (ref 5–33)
ANION GAP SERPL CALCULATED.3IONS-SCNC: 11 MMOL/L (ref 9–17)
AST SERPL-CCNC: 15 U/L
BASOPHILS # BLD: 0.03 K/UL (ref 0–0.2)
BASOPHILS NFR BLD: 1 % (ref 0–2)
BILIRUB SERPL-MCNC: 0.4 MG/DL (ref 0.3–1.2)
BUN SERPL-MCNC: 20 MG/DL (ref 8–23)
BUN/CREAT SERPL: 29 (ref 9–20)
CALCIUM SERPL-MCNC: 10.1 MG/DL (ref 8.6–10.4)
CHLORIDE SERPL-SCNC: 103 MMOL/L (ref 98–107)
CO2 SERPL-SCNC: 28 MMOL/L (ref 20–31)
CREAT SERPL-MCNC: 0.7 MG/DL (ref 0.5–0.9)
EOSINOPHIL # BLD: 0.03 K/UL (ref 0–0.44)
EOSINOPHILS RELATIVE PERCENT: 1 % (ref 1–4)
ERYTHROCYTE [DISTWIDTH] IN BLOOD BY AUTOMATED COUNT: 14.1 % (ref 11.8–14.4)
GFR, ESTIMATED: >90 ML/MIN/1.73M2
GLUCOSE SERPL-MCNC: 86 MG/DL (ref 70–99)
HCT VFR BLD AUTO: 45.2 % (ref 36.3–47.1)
HGB BLD-MCNC: 15 G/DL (ref 11.9–15.1)
IMM GRANULOCYTES # BLD AUTO: <0.03 K/UL (ref 0–0.3)
IMM GRANULOCYTES NFR BLD: 1 %
LYMPHOCYTES NFR BLD: 1.06 K/UL (ref 1.1–3.7)
LYMPHOCYTES RELATIVE PERCENT: 26 % (ref 24–43)
MCH RBC QN AUTO: 28.7 PG (ref 25.2–33.5)
MCHC RBC AUTO-ENTMCNC: 33.2 G/DL (ref 25.2–33.5)
MCV RBC AUTO: 86.6 FL (ref 82.6–102.9)
MONOCYTES NFR BLD: 0.38 K/UL (ref 0.1–1.2)
MONOCYTES NFR BLD: 9 % (ref 3–12)
NEUTROPHILS NFR BLD: 62 % (ref 36–65)
NEUTS SEG NFR BLD: 2.52 K/UL (ref 1.5–8.1)
NRBC BLD-RTO: 0 PER 100 WBC
PLATELET # BLD AUTO: 148 K/UL (ref 138–453)
PMV BLD AUTO: 10.3 FL (ref 8.1–13.5)
POTASSIUM SERPL-SCNC: 4 MMOL/L (ref 3.7–5.3)
PROT SERPL-MCNC: 7.4 G/DL (ref 6.4–8.3)
RBC # BLD AUTO: 5.22 M/UL (ref 3.95–5.11)
SODIUM SERPL-SCNC: 142 MMOL/L (ref 135–144)
WBC OTHER # BLD: 4 K/UL (ref 3.5–11.3)

## 2024-11-04 PROCEDURE — 36415 COLL VENOUS BLD VENIPUNCTURE: CPT

## 2024-11-04 PROCEDURE — 85025 COMPLETE CBC W/AUTO DIFF WBC: CPT

## 2024-11-04 PROCEDURE — 80053 COMPREHEN METABOLIC PANEL: CPT

## 2024-11-05 ENCOUNTER — TELEPHONE (OUTPATIENT)
Dept: FAMILY MEDICINE CLINIC | Age: 74
End: 2024-11-05

## 2024-11-05 NOTE — TELEPHONE ENCOUNTER
Left message for Airam to call back regarding what is all needed lab, xray, cardio wise for clearance on patient. Seeing her tomorrow for pre op

## 2024-11-06 ENCOUNTER — HOSPITAL ENCOUNTER (OUTPATIENT)
Age: 74
Discharge: HOME | End: 2024-11-06
Payer: MEDICARE

## 2024-11-06 ENCOUNTER — HOSPITAL ENCOUNTER (OUTPATIENT)
Dept: GENERAL RADIOLOGY | Age: 74
Discharge: HOME | End: 2024-11-08
Payer: MEDICARE

## 2024-11-06 ENCOUNTER — OFFICE VISIT (OUTPATIENT)
Dept: FAMILY MEDICINE CLINIC | Age: 74
End: 2024-11-06

## 2024-11-06 VITALS
BODY MASS INDEX: 35.08 KG/M2 | WEIGHT: 198 LBS | OXYGEN SATURATION: 96 % | HEART RATE: 72 BPM | SYSTOLIC BLOOD PRESSURE: 130 MMHG | RESPIRATION RATE: 18 BRPM | TEMPERATURE: 97.3 F | DIASTOLIC BLOOD PRESSURE: 80 MMHG | HEIGHT: 63 IN

## 2024-11-06 DIAGNOSIS — E78.2 MIXED HYPERLIPIDEMIA: ICD-10-CM

## 2024-11-06 DIAGNOSIS — I10 ESSENTIAL HYPERTENSION: ICD-10-CM

## 2024-11-06 DIAGNOSIS — Z01.818 PREOPERATIVE CLEARANCE: ICD-10-CM

## 2024-11-06 DIAGNOSIS — R73.01 IMPAIRED FASTING BLOOD SUGAR: ICD-10-CM

## 2024-11-06 DIAGNOSIS — M51.362 DEGENERATION OF INTERVERTEBRAL DISC OF LUMBAR REGION WITH DISCOGENIC BACK PAIN AND LOWER EXTREMITY PAIN: ICD-10-CM

## 2024-11-06 DIAGNOSIS — M54.16 LUMBAR RADICULOPATHY: ICD-10-CM

## 2024-11-06 DIAGNOSIS — M48.061 SPINAL STENOSIS OF LUMBAR REGION, UNSPECIFIED WHETHER NEUROGENIC CLAUDICATION PRESENT: Primary | ICD-10-CM

## 2024-11-06 LAB
INR PPP: 1
PARTIAL THROMBOPLASTIN TIME: 28.6 SEC (ref 23.9–33.8)
PROTHROMBIN TIME: 12.9 SEC (ref 11.5–14.2)

## 2024-11-06 PROCEDURE — 36415 COLL VENOUS BLD VENIPUNCTURE: CPT

## 2024-11-06 PROCEDURE — 71046 X-RAY EXAM CHEST 2 VIEWS: CPT

## 2024-11-06 PROCEDURE — 85730 THROMBOPLASTIN TIME PARTIAL: CPT

## 2024-11-06 PROCEDURE — 85610 PROTHROMBIN TIME: CPT

## 2024-11-06 PROCEDURE — 87641 MR-STAPH DNA AMP PROBE: CPT

## 2024-11-07 ENCOUNTER — HOSPITAL ENCOUNTER (OUTPATIENT)
Dept: MAMMOGRAPHY | Age: 74
Discharge: HOME | End: 2024-11-09
Attending: FAMILY MEDICINE
Payer: MEDICARE

## 2024-11-07 VITALS — HEIGHT: 63 IN | BODY MASS INDEX: 34.55 KG/M2 | WEIGHT: 195 LBS

## 2024-11-07 DIAGNOSIS — Z12.31 ENCOUNTER FOR SCREENING MAMMOGRAM FOR MALIGNANT NEOPLASM OF BREAST: ICD-10-CM

## 2024-11-07 PROCEDURE — 77063 BREAST TOMOSYNTHESIS BI: CPT

## 2024-11-07 ASSESSMENT — ENCOUNTER SYMPTOMS
EYE DISCHARGE: 0
WHEEZING: 0
CONSTIPATION: 0
VOMITING: 0
SINUS PRESSURE: 0
SHORTNESS OF BREATH: 0
EYE REDNESS: 0
NAUSEA: 0
DIARRHEA: 0
RHINORRHEA: 0
TROUBLE SWALLOWING: 0
ABDOMINAL PAIN: 0
BACK PAIN: 1
SORE THROAT: 0
COUGH: 0

## 2024-11-07 NOTE — PROGRESS NOTES
2024     Terra Hills (:  1950) is a 74 y.o. female, here for evaluation of the following medical concerns:    HPI  Patient comes in today for preoperative clearance for upcoming L2-L5 decompression and fusion surgery.  Patient has been having ongoing chronic problems with back pain for many years but it has gotten to the point where she is having increased difficulty with doing most of her activities of daily living without pain.  Ultimately has decided to pursue surgical intervention.  With regards to her overall health condition she does have a known history of hypertension which has been stable and controlled on her current medical regimen.  Has a known history of hyperlipidemia and impaired fasting glucose which have been stable and controlled with dietary efforts and medical therapy as listed.  Has a history of obstructive sleep apnea and does consistently use a CPAP machine.  Does not have any significant acute respiratory or cardiac symptoms at this time including chest pain shortness of breath cough chest congestion fever chills.  No other acute medical complaints including headaches lightheadedness nausea vomiting diarrhea abdominal pain urinary complaints.  Recent EKG from May 24, 2024 did reveal normal sinus rhythm.  Most recent lab testing is as listed below.  Health history is as reviewed reviewed below.  Patient's recent lab reports are as follows:    Results for orders placed or performed during the hospital encounter of 24   Comprehensive Metabolic Panel   Result Value Ref Range    Sodium 142 135 - 144 mmol/L    Potassium 4.0 3.7 - 5.3 mmol/L    Chloride 103 98 - 107 mmol/L    CO2 28 20 - 31 mmol/L    Anion Gap 11 9 - 17 mmol/L    Glucose 86 70 - 99 mg/dL    BUN 20 8 - 23 mg/dL    Creatinine 0.7 0.5 - 0.9 mg/dL    Est, Glom Filt Rate >90 >60 mL/min/1.73m2    BUN/Creatinine Ratio 29 (H) 9 - 20    Calcium 10.1 8.6 - 10.4 mg/dL    Total Protein 7.4 6.4 - 8.3 g/dL    Albumin

## 2024-11-08 LAB
MRSA, DNA, NASAL: NEGATIVE
SPECIMEN DESCRIPTION: NORMAL

## 2024-11-18 ENCOUNTER — HOSPITAL ENCOUNTER (INPATIENT)
Age: 74
LOS: 7 days | Discharge: NURSING, REHAB OR LONG-TERM CARE | DRG: 948 | End: 2024-11-25
Attending: EMERGENCY MEDICINE | Admitting: INTERNAL MEDICINE
Payer: MEDICARE

## 2024-11-18 ENCOUNTER — APPOINTMENT (OUTPATIENT)
Dept: MRI IMAGING | Age: 74
DRG: 948 | End: 2024-11-18
Payer: MEDICARE

## 2024-11-18 ENCOUNTER — HOSPITAL ENCOUNTER (EMERGENCY)
Age: 74
Discharge: HOME | DRG: 948 | End: 2024-11-18
Attending: EMERGENCY MEDICINE
Payer: MEDICARE

## 2024-11-18 VITALS
HEIGHT: 62 IN | WEIGHT: 195 LBS | BODY MASS INDEX: 35.88 KG/M2 | OXYGEN SATURATION: 97 % | SYSTOLIC BLOOD PRESSURE: 113 MMHG | DIASTOLIC BLOOD PRESSURE: 66 MMHG | HEART RATE: 81 BPM | TEMPERATURE: 98.1 F | RESPIRATION RATE: 16 BRPM

## 2024-11-18 DIAGNOSIS — S39.012A BACK STRAIN, INITIAL ENCOUNTER: Primary | ICD-10-CM

## 2024-11-18 DIAGNOSIS — M54.16 LUMBAR RADICULOPATHY: ICD-10-CM

## 2024-11-18 DIAGNOSIS — G89.18 POSTOPERATIVE PAIN: Primary | ICD-10-CM

## 2024-11-18 LAB
APTT PPP: 29.3 SECONDS (ref 22–38)
BASOPHILS ABSOLUTE: 0 THOU/MM3 (ref 0–0.1)
BASOPHILS NFR BLD AUTO: 0.2 %
DEPRECATED RDW RBC AUTO: 46.3 FL (ref 35–45)
EOSINOPHIL NFR BLD AUTO: 0.2 %
EOSINOPHILS ABSOLUTE: 0 THOU/MM3 (ref 0–0.4)
ERYTHROCYTE [DISTWIDTH] IN BLOOD BY AUTOMATED COUNT: 14.4 % (ref 11.5–14.5)
HCT VFR BLD AUTO: 33 % (ref 37–47)
HGB BLD-MCNC: 10.7 GM/DL (ref 12–16)
IMM GRANULOCYTES # BLD AUTO: 0.02 THOU/MM3 (ref 0–0.07)
IMM GRANULOCYTES NFR BLD AUTO: 0.4 %
INR PPP: 0.98 (ref 0.85–1.13)
LYMPHOCYTES ABSOLUTE: 0.6 THOU/MM3 (ref 1–4.8)
LYMPHOCYTES NFR BLD AUTO: 11 %
MCH RBC QN AUTO: 28.6 PG (ref 26–33)
MCHC RBC AUTO-ENTMCNC: 32.4 GM/DL (ref 32.2–35.5)
MCV RBC AUTO: 88.2 FL (ref 81–99)
MONOCYTES ABSOLUTE: 0.4 THOU/MM3 (ref 0.4–1.3)
MONOCYTES NFR BLD AUTO: 6.9 %
NEUTROPHILS ABSOLUTE: 4.2 THOU/MM3 (ref 1.8–7.7)
NEUTROPHILS NFR BLD AUTO: 81.3 %
NRBC BLD AUTO-RTO: 0 /100 WBC
PLATELET # BLD AUTO: 185 THOU/MM3 (ref 130–400)
PMV BLD AUTO: 10.4 FL (ref 9.4–12.4)
RBC # BLD AUTO: 3.74 MILL/MM3 (ref 4.2–5.4)
WBC # BLD AUTO: 5.2 THOU/MM3 (ref 4.8–10.8)

## 2024-11-18 PROCEDURE — 1200000000 HC SEMI PRIVATE

## 2024-11-18 PROCEDURE — A9579 GAD-BASE MR CONTRAST NOS,1ML: HCPCS

## 2024-11-18 PROCEDURE — 99285 EMERGENCY DEPT VISIT HI MDM: CPT

## 2024-11-18 PROCEDURE — 36415 COLL VENOUS BLD VENIPUNCTURE: CPT

## 2024-11-18 PROCEDURE — 85610 PROTHROMBIN TIME: CPT

## 2024-11-18 PROCEDURE — 85025 COMPLETE CBC W/AUTO DIFF WBC: CPT

## 2024-11-18 PROCEDURE — 6360000002 HC RX W HCPCS: Performed by: EMERGENCY MEDICINE

## 2024-11-18 PROCEDURE — 6360000004 HC RX CONTRAST MEDICATION

## 2024-11-18 PROCEDURE — 6370000000 HC RX 637 (ALT 250 FOR IP)

## 2024-11-18 PROCEDURE — 72158 MRI LUMBAR SPINE W/O & W/DYE: CPT

## 2024-11-18 PROCEDURE — 85730 THROMBOPLASTIN TIME PARTIAL: CPT

## 2024-11-18 RX ORDER — DEXAMETHASONE SODIUM PHOSPHATE 4 MG/ML
6 INJECTION, SOLUTION INTRA-ARTICULAR; INTRALESIONAL; INTRAMUSCULAR; INTRAVENOUS; SOFT TISSUE ONCE
Status: COMPLETED | OUTPATIENT
Start: 2024-11-18 | End: 2024-11-18

## 2024-11-18 RX ORDER — HYDROCODONE BITARTRATE AND ACETAMINOPHEN 5; 325 MG/1; MG/1
2 TABLET ORAL EVERY 4 HOURS PRN
Status: DISCONTINUED | OUTPATIENT
Start: 2024-11-18 | End: 2024-11-25 | Stop reason: HOSPADM

## 2024-11-18 RX ORDER — MORPHINE SULFATE 4 MG/ML
4 INJECTION, SOLUTION INTRAMUSCULAR; INTRAVENOUS ONCE
Status: COMPLETED | OUTPATIENT
Start: 2024-11-18 | End: 2024-11-18

## 2024-11-18 RX ORDER — POTASSIUM CHLORIDE 1500 MG/1
40 TABLET, EXTENDED RELEASE ORAL PRN
Status: DISCONTINUED | OUTPATIENT
Start: 2024-11-18 | End: 2024-11-25 | Stop reason: HOSPADM

## 2024-11-18 RX ORDER — SODIUM CHLORIDE 0.9 % (FLUSH) 0.9 %
5-40 SYRINGE (ML) INJECTION EVERY 12 HOURS SCHEDULED
Status: DISCONTINUED | OUTPATIENT
Start: 2024-11-18 | End: 2024-11-25 | Stop reason: HOSPADM

## 2024-11-18 RX ORDER — MAGNESIUM SULFATE IN WATER 40 MG/ML
2000 INJECTION, SOLUTION INTRAVENOUS PRN
Status: DISCONTINUED | OUTPATIENT
Start: 2024-11-18 | End: 2024-11-25 | Stop reason: HOSPADM

## 2024-11-18 RX ORDER — FAMOTIDINE 20 MG/1
20 TABLET, FILM COATED ORAL 2 TIMES DAILY
Status: DISCONTINUED | OUTPATIENT
Start: 2024-11-18 | End: 2024-11-25 | Stop reason: HOSPADM

## 2024-11-18 RX ORDER — ACETAMINOPHEN 325 MG/1
650 TABLET ORAL EVERY 6 HOURS PRN
Status: DISCONTINUED | OUTPATIENT
Start: 2024-11-18 | End: 2024-11-25 | Stop reason: HOSPADM

## 2024-11-18 RX ORDER — CYCLOBENZAPRINE HCL 10 MG
10 TABLET ORAL 3 TIMES DAILY PRN
COMMUNITY
Start: 2024-11-07

## 2024-11-18 RX ORDER — SODIUM CHLORIDE 9 MG/ML
INJECTION, SOLUTION INTRAVENOUS PRN
Status: DISCONTINUED | OUTPATIENT
Start: 2024-11-18 | End: 2024-11-25 | Stop reason: HOSPADM

## 2024-11-18 RX ORDER — OXYCODONE AND ACETAMINOPHEN 5; 325 MG/1; MG/1
1 TABLET ORAL EVERY 6 HOURS PRN
Status: ON HOLD | COMMUNITY
Start: 2024-11-07 | End: 2024-11-25

## 2024-11-18 RX ORDER — POLYETHYLENE GLYCOL 3350 17 G/17G
17 POWDER, FOR SOLUTION ORAL DAILY PRN
Status: DISCONTINUED | OUTPATIENT
Start: 2024-11-18 | End: 2024-11-25 | Stop reason: HOSPADM

## 2024-11-18 RX ORDER — ONDANSETRON 4 MG/1
4 TABLET, ORALLY DISINTEGRATING ORAL EVERY 8 HOURS PRN
Status: DISCONTINUED | OUTPATIENT
Start: 2024-11-18 | End: 2024-11-25 | Stop reason: HOSPADM

## 2024-11-18 RX ORDER — ENOXAPARIN SODIUM 100 MG/ML
40 INJECTION SUBCUTANEOUS DAILY
Status: DISCONTINUED | OUTPATIENT
Start: 2024-11-19 | End: 2024-11-25 | Stop reason: HOSPADM

## 2024-11-18 RX ORDER — ACETAMINOPHEN 650 MG/1
650 SUPPOSITORY RECTAL EVERY 6 HOURS PRN
Status: DISCONTINUED | OUTPATIENT
Start: 2024-11-18 | End: 2024-11-25 | Stop reason: HOSPADM

## 2024-11-18 RX ORDER — METOPROLOL TARTRATE 25 MG/1
25 TABLET, FILM COATED ORAL 2 TIMES DAILY
Status: DISCONTINUED | OUTPATIENT
Start: 2024-11-18 | End: 2024-11-22

## 2024-11-18 RX ORDER — DIAZEPAM 5 MG/1
5 TABLET ORAL ONCE
Status: COMPLETED | OUTPATIENT
Start: 2024-11-18 | End: 2024-11-18

## 2024-11-18 RX ORDER — SODIUM CHLORIDE 0.9 % (FLUSH) 0.9 %
5-40 SYRINGE (ML) INJECTION PRN
Status: DISCONTINUED | OUTPATIENT
Start: 2024-11-18 | End: 2024-11-25 | Stop reason: HOSPADM

## 2024-11-18 RX ORDER — AMLODIPINE BESYLATE 5 MG/1
5 TABLET ORAL DAILY
Status: DISCONTINUED | OUTPATIENT
Start: 2024-11-19 | End: 2024-11-23

## 2024-11-18 RX ORDER — POTASSIUM CHLORIDE 7.45 MG/ML
10 INJECTION INTRAVENOUS PRN
Status: DISCONTINUED | OUTPATIENT
Start: 2024-11-18 | End: 2024-11-25 | Stop reason: HOSPADM

## 2024-11-18 RX ORDER — ONDANSETRON 2 MG/ML
4 INJECTION INTRAMUSCULAR; INTRAVENOUS EVERY 6 HOURS PRN
Status: DISCONTINUED | OUTPATIENT
Start: 2024-11-18 | End: 2024-11-25 | Stop reason: HOSPADM

## 2024-11-18 RX ORDER — HYDROCODONE BITARTRATE AND ACETAMINOPHEN 5; 325 MG/1; MG/1
1 TABLET ORAL EVERY 4 HOURS PRN
Status: DISCONTINUED | OUTPATIENT
Start: 2024-11-18 | End: 2024-11-25 | Stop reason: HOSPADM

## 2024-11-18 RX ORDER — MORPHINE SULFATE 2 MG/ML
2 INJECTION, SOLUTION INTRAMUSCULAR; INTRAVENOUS EVERY 4 HOURS PRN
Status: DISCONTINUED | OUTPATIENT
Start: 2024-11-18 | End: 2024-11-25 | Stop reason: HOSPADM

## 2024-11-18 RX ORDER — DEXAMETHASONE SODIUM PHOSPHATE 10 MG/ML
8 INJECTION, EMULSION INTRAMUSCULAR; INTRAVENOUS EVERY 8 HOURS
Status: DISCONTINUED | OUTPATIENT
Start: 2024-11-18 | End: 2024-11-21

## 2024-11-18 RX ADMIN — GADOTERIDOL 15 ML: 279.3 INJECTION, SOLUTION INTRAVENOUS at 15:44

## 2024-11-18 RX ADMIN — DIAZEPAM 5 MG: 5 TABLET ORAL at 14:16

## 2024-11-18 RX ADMIN — DEXAMETHASONE SODIUM PHOSPHATE 6 MG: 4 INJECTION, SOLUTION INTRA-ARTICULAR; INTRALESIONAL; INTRAMUSCULAR; INTRAVENOUS; SOFT TISSUE at 18:52

## 2024-11-18 RX ADMIN — MORPHINE SULFATE 4 MG: 4 INJECTION, SOLUTION INTRAMUSCULAR; INTRAVENOUS at 18:52

## 2024-11-18 ASSESSMENT — PAIN SCALES - GENERAL
PAINLEVEL_OUTOF10: 10

## 2024-11-18 ASSESSMENT — PAIN DESCRIPTION - ORIENTATION: ORIENTATION: LOWER

## 2024-11-18 ASSESSMENT — PAIN DESCRIPTION - LOCATION
LOCATION: BACK
LOCATION: BACK

## 2024-11-18 ASSESSMENT — PAIN DESCRIPTION - DESCRIPTORS: DESCRIPTORS: SHARP;SHOOTING;JABBING

## 2024-11-18 ASSESSMENT — PAIN - FUNCTIONAL ASSESSMENT
PAIN_FUNCTIONAL_ASSESSMENT: 0-10
PAIN_FUNCTIONAL_ASSESSMENT: 0-10

## 2024-11-18 NOTE — ED PROVIDER NOTES
[facility] - VISIT NOTE    Patient Name: Terra Hills  MRN: [id number]  YOB: 1950  Date of Evaluation: 11/18/2024  Treating Resident Physician: Nitin Morales MD  Supervising Physician: Ari Goel DO    CHIEF COMPLAINT       Chief Complaint   Patient presents with    Back Pain       HISTORY OF PRESENT ILLNESS    HPI    History obtained from the patient.    Terra is a 74 y.o. old female who presents to the emergency department by Wheelchair for evaluation of back pain.  Patient underweight lumbar fusion last week with Dr. Lawson over at [facility].  Patient was at follow-up appointment this morning and Dr. Saint clair advised patient to come to the emergency department for emergent MRI as he is concerned for cauda equina syndrome.  Patient having significant pain, no saddle anesthesia, no incontinence.    Chart reviewed, relevant history summarized in HPI above.      REVIEW OF SYSTEMS   Review of Systems  Negative unless documented in HPI    PAST MEDICAL AND SURGICAL HISTORY   Terra  has a past medical history of Abnormal liver function tests, Hyperlipidemia, Hypertension, Insulin resistance, Macular degeneration of left eye (01/01/2013), Obstructive sleep apnea, Osteoarthritis, and Thyroid nodule.    Terra  has a past surgical history that includes hysteroscopy (08/25/2005); other surgical history (07/12/2005); Abdominal exploration surgery (01/01/1994); Ovary removal (Left); and Total hip arthroplasty (Left, 07/16/2024).    CURRENT MEDICATIONS   Terra has a current medication list which includes the following long-term medication(s): hydrochlorothiazide, diclofenac sodium, amlodipine, respiratory therapy supplies, semaglutide (1 mg/dose), metoprolol tartrate, rosuvastatin, lisinopril, sumatriptan, omega-3 fatty acids, and multiple vitamins-minerals.    ALLERGIES   Terra is allergic to simvastatin.    FAMILY HISTORY   Terra family history includes Cancer in her child and  person, place, and time.        FORMAL DIAGNOSTIC RESULTS   RADIOLOGY: Interpretation per the Radiologist below, if available at the time of this note (none if blank):  MRI LUMBAR SPINE W WO CONTRAST   Final Result      1. Postoperative changes with bilateral pedicular screws at L2, L3, L4 and L5.   Laminectomy defects at L2, L3 and L4.   2. There is mild canal and moderate bilateral foraminal stenosis at L4-5, L5-S1   and to a lesser extent at L1-2.   3. There is moderate bilateral foraminal stenosis with no canal stenosis at   L3-4.   4. There are enhancing right L2 and L4 nerve roots which may be inflamed.   5. There is degenerative change involving the sacroiliac joints bilaterally.   6. There is a small right renal cyst.               **This report has been created using voice recognition software. It may contain   minor errors which are inherent in voice recognition technology.**            Electronically signed by Dr. Sabrina Hoyt          LABS: (none if blank)  Labs Reviewed   CBC WITH AUTO DIFFERENTIAL - Abnormal; Notable for the following components:       Result Value    RBC 3.74 (*)     Hemoglobin 10.7 (*)     Hematocrit 33.0 (*)     RDW-SD 46.3 (*)     Lymphocytes Absolute 0.6 (*)     All other components within normal limits   PROTIME-INR   APTT     (Any cultures that may have been sent were not resulted at the time of this patient visit. A negative COVID-19 test should be interpreted as COVID no longer suspected unless otherwise noted in this encounter documentation note)    EKG:   None    MEDICAL DECISION MAKING     Summary: This is a 74 y.o. old female presenting to the ED per the recommendation of her orthopedist from [place] with Dr. Zaidi.  [facility] orthopedist who advised concern for cauda equina syndrome, wanting an MRI.  This was obtained with the assistance of some Valium given the patient's claustrophobia.  The MRI results do not show any immediate surgical problems and these results

## 2024-11-18 NOTE — ED TRIAGE NOTES
Presents to ER with concern of uncontrolled pain 11/11/2024. Pt has MRI today in ED. Negative testing. Negative lab work. Reports speaking with Dr. Rao and told to come to ED for admission for pain management. Reports taking tylenol, flexeril, and percocet with no relief. No fevers or chills. Will monitor

## 2024-11-18 NOTE — ED TRIAGE NOTES
Presents to ED with c/o lower back pain and leg pain. Patient had back surgery a week ago and was seen today at [facility]. Patient was sent here for MRI. Alert and oriented. Respirations easy and unlabored.

## 2024-11-18 NOTE — DISCHARGE INSTRUCTIONS
Continue to use your prescribed pain medications at home.    Follow-up with Dr. Salazar of the orthopedic institute [facility] to further guide your pain medicines.

## 2024-11-19 ENCOUNTER — APPOINTMENT (OUTPATIENT)
Dept: GENERAL RADIOLOGY | Age: 74
DRG: 948 | End: 2024-11-19
Payer: MEDICARE

## 2024-11-19 LAB
ANION GAP SERPL CALC-SCNC: 11 MEQ/L (ref 8–16)
BASOPHILS ABSOLUTE: 0 THOU/MM3 (ref 0–0.1)
BASOPHILS NFR BLD AUTO: 0 %
BUN SERPL-MCNC: 19 MG/DL (ref 7–22)
CALCIUM SERPL-MCNC: 9.2 MG/DL (ref 8.5–10.5)
CHLORIDE SERPL-SCNC: 102 MEQ/L (ref 98–111)
CO2 SERPL-SCNC: 23 MEQ/L (ref 23–33)
CREAT SERPL-MCNC: 0.6 MG/DL (ref 0.4–1.2)
DEPRECATED RDW RBC AUTO: 46 FL (ref 35–45)
EOSINOPHIL NFR BLD AUTO: 0 %
EOSINOPHILS ABSOLUTE: 0 THOU/MM3 (ref 0–0.4)
ERYTHROCYTE [DISTWIDTH] IN BLOOD BY AUTOMATED COUNT: 14.3 % (ref 11.5–14.5)
GFR SERPL CREATININE-BSD FRML MDRD: > 90 ML/MIN/1.73M2
GLUCOSE SERPL-MCNC: 159 MG/DL (ref 70–108)
HCT VFR BLD AUTO: 32.5 % (ref 37–47)
HGB BLD-MCNC: 10.3 GM/DL (ref 12–16)
IMM GRANULOCYTES # BLD AUTO: 0.04 THOU/MM3 (ref 0–0.07)
IMM GRANULOCYTES NFR BLD AUTO: 1 %
LYMPHOCYTES ABSOLUTE: 0.3 THOU/MM3 (ref 1–4.8)
LYMPHOCYTES NFR BLD AUTO: 7.2 %
MCH RBC QN AUTO: 28.4 PG (ref 26–33)
MCHC RBC AUTO-ENTMCNC: 31.7 GM/DL (ref 32.2–35.5)
MCV RBC AUTO: 89.5 FL (ref 81–99)
MONOCYTES ABSOLUTE: 0 THOU/MM3 (ref 0.4–1.3)
MONOCYTES NFR BLD AUTO: 1 %
NEUTROPHILS ABSOLUTE: 3.7 THOU/MM3 (ref 1.8–7.7)
NEUTROPHILS NFR BLD AUTO: 90.8 %
NRBC BLD AUTO-RTO: 0 /100 WBC
PLATELET # BLD AUTO: 178 THOU/MM3 (ref 130–400)
PMV BLD AUTO: 10.4 FL (ref 9.4–12.4)
POTASSIUM SERPL-SCNC: 4.7 MEQ/L (ref 3.5–5.2)
RBC # BLD AUTO: 3.63 MILL/MM3 (ref 4.2–5.4)
SODIUM SERPL-SCNC: 136 MEQ/L (ref 135–145)
WBC # BLD AUTO: 4.1 THOU/MM3 (ref 4.8–10.8)

## 2024-11-19 PROCEDURE — 6370000000 HC RX 637 (ALT 250 FOR IP): Performed by: INTERNAL MEDICINE

## 2024-11-19 PROCEDURE — 97166 OT EVAL MOD COMPLEX 45 MIN: CPT

## 2024-11-19 PROCEDURE — 2580000003 HC RX 258: Performed by: INTERNAL MEDICINE

## 2024-11-19 PROCEDURE — 71046 X-RAY EXAM CHEST 2 VIEWS: CPT

## 2024-11-19 PROCEDURE — 97535 SELF CARE MNGMENT TRAINING: CPT

## 2024-11-19 PROCEDURE — 6360000002 HC RX W HCPCS: Performed by: INTERNAL MEDICINE

## 2024-11-19 PROCEDURE — 6370000000 HC RX 637 (ALT 250 FOR IP): Performed by: PHYSICIAN ASSISTANT

## 2024-11-19 PROCEDURE — 85025 COMPLETE CBC W/AUTO DIFF WBC: CPT

## 2024-11-19 PROCEDURE — 80048 BASIC METABOLIC PNL TOTAL CA: CPT

## 2024-11-19 PROCEDURE — 36415 COLL VENOUS BLD VENIPUNCTURE: CPT

## 2024-11-19 PROCEDURE — 97530 THERAPEUTIC ACTIVITIES: CPT

## 2024-11-19 PROCEDURE — 1200000000 HC SEMI PRIVATE

## 2024-11-19 PROCEDURE — 99221 1ST HOSP IP/OBS SF/LOW 40: CPT | Performed by: NURSE PRACTITIONER

## 2024-11-19 PROCEDURE — 97162 PT EVAL MOD COMPLEX 30 MIN: CPT

## 2024-11-19 RX ORDER — BISACODYL 10 MG
10 SUPPOSITORY, RECTAL RECTAL DAILY PRN
Status: DISCONTINUED | OUTPATIENT
Start: 2024-11-19 | End: 2024-11-25 | Stop reason: HOSPADM

## 2024-11-19 RX ORDER — MULTIVITAMIN WITH IRON
1 TABLET ORAL DAILY
Status: DISCONTINUED | OUTPATIENT
Start: 2024-11-19 | End: 2024-11-25 | Stop reason: HOSPADM

## 2024-11-19 RX ORDER — DOCUSATE SODIUM 100 MG/1
100 CAPSULE, LIQUID FILLED ORAL 2 TIMES DAILY
COMMUNITY

## 2024-11-19 RX ORDER — DIAZEPAM 5 MG/1
5 TABLET ORAL EVERY 8 HOURS PRN
Status: DISCONTINUED | OUTPATIENT
Start: 2024-11-19 | End: 2024-11-25 | Stop reason: HOSPADM

## 2024-11-19 RX ORDER — CYCLOBENZAPRINE HCL 10 MG
10 TABLET ORAL 3 TIMES DAILY PRN
Status: DISCONTINUED | OUTPATIENT
Start: 2024-11-19 | End: 2024-11-25 | Stop reason: HOSPADM

## 2024-11-19 RX ORDER — SENNA AND DOCUSATE SODIUM 50; 8.6 MG/1; MG/1
2 TABLET, FILM COATED ORAL DAILY
Status: DISCONTINUED | OUTPATIENT
Start: 2024-11-19 | End: 2024-11-25 | Stop reason: HOSPADM

## 2024-11-19 RX ORDER — ROSUVASTATIN CALCIUM 20 MG/1
20 TABLET, COATED ORAL NIGHTLY
Status: DISCONTINUED | OUTPATIENT
Start: 2024-11-19 | End: 2024-11-25 | Stop reason: HOSPADM

## 2024-11-19 RX ORDER — DOCUSATE SODIUM 100 MG/1
100 CAPSULE, LIQUID FILLED ORAL DAILY
Status: DISCONTINUED | OUTPATIENT
Start: 2024-11-19 | End: 2024-11-25 | Stop reason: HOSPADM

## 2024-11-19 RX ADMIN — SODIUM CHLORIDE, PRESERVATIVE FREE 10 ML: 5 INJECTION INTRAVENOUS at 21:26

## 2024-11-19 RX ADMIN — FAMOTIDINE 20 MG: 20 TABLET, FILM COATED ORAL at 09:27

## 2024-11-19 RX ADMIN — HYDROCODONE BITARTRATE AND ACETAMINOPHEN 2 TABLET: 5; 325 TABLET ORAL at 13:39

## 2024-11-19 RX ADMIN — METOPROLOL TARTRATE 25 MG: 25 TABLET, FILM COATED ORAL at 09:28

## 2024-11-19 RX ADMIN — METOPROLOL TARTRATE 25 MG: 25 TABLET, FILM COATED ORAL at 21:26

## 2024-11-19 RX ADMIN — FAMOTIDINE 20 MG: 20 TABLET, FILM COATED ORAL at 21:26

## 2024-11-19 RX ADMIN — HYDROCODONE BITARTRATE AND ACETAMINOPHEN 2 TABLET: 5; 325 TABLET ORAL at 09:23

## 2024-11-19 RX ADMIN — SENNOSIDES AND DOCUSATE SODIUM 2 TABLET: 50; 8.6 TABLET ORAL at 09:24

## 2024-11-19 RX ADMIN — AMLODIPINE BESYLATE 5 MG: 5 TABLET ORAL at 09:28

## 2024-11-19 RX ADMIN — FAMOTIDINE 20 MG: 20 TABLET, FILM COATED ORAL at 00:29

## 2024-11-19 RX ADMIN — DIAZEPAM 5 MG: 5 TABLET ORAL at 09:36

## 2024-11-19 RX ADMIN — METOPROLOL TARTRATE 25 MG: 25 TABLET, FILM COATED ORAL at 00:29

## 2024-11-19 RX ADMIN — SODIUM CHLORIDE, PRESERVATIVE FREE 10 ML: 5 INJECTION INTRAVENOUS at 00:41

## 2024-11-19 RX ADMIN — DEXAMETHASONE SODIUM PHOSPHATE 8 MG: 10 INJECTION, EMULSION INTRAMUSCULAR; INTRAVENOUS at 21:26

## 2024-11-19 RX ADMIN — HYDROCODONE BITARTRATE AND ACETAMINOPHEN 1 TABLET: 5; 325 TABLET ORAL at 04:41

## 2024-11-19 RX ADMIN — DEXAMETHASONE SODIUM PHOSPHATE 8 MG: 10 INJECTION, EMULSION INTRAMUSCULAR; INTRAVENOUS at 00:29

## 2024-11-19 RX ADMIN — DIAZEPAM 5 MG: 5 TABLET ORAL at 17:36

## 2024-11-19 RX ADMIN — Medication 1 TABLET: at 21:26

## 2024-11-19 RX ADMIN — DEXAMETHASONE SODIUM PHOSPHATE 8 MG: 10 INJECTION, EMULSION INTRAMUSCULAR; INTRAVENOUS at 13:42

## 2024-11-19 RX ADMIN — POLYETHYLENE GLYCOL 3350 17 G: 17 POWDER, FOR SOLUTION ORAL at 09:27

## 2024-11-19 RX ADMIN — HYDROCODONE BITARTRATE AND ACETAMINOPHEN 2 TABLET: 5; 325 TABLET ORAL at 17:36

## 2024-11-19 RX ADMIN — HYDROCODONE BITARTRATE AND ACETAMINOPHEN 2 TABLET: 5; 325 TABLET ORAL at 21:27

## 2024-11-19 RX ADMIN — ROSUVASTATIN CALCIUM 20 MG: 20 TABLET, FILM COATED ORAL at 23:24

## 2024-11-19 RX ADMIN — ENOXAPARIN SODIUM 40 MG: 100 INJECTION SUBCUTANEOUS at 09:30

## 2024-11-19 RX ADMIN — DEXAMETHASONE SODIUM PHOSPHATE 8 MG: 10 INJECTION, EMULSION INTRAMUSCULAR; INTRAVENOUS at 04:41

## 2024-11-19 RX ADMIN — SODIUM CHLORIDE, PRESERVATIVE FREE 10 ML: 5 INJECTION INTRAVENOUS at 09:29

## 2024-11-19 RX ADMIN — DOCUSATE SODIUM 100 MG: 100 CAPSULE, LIQUID FILLED ORAL at 09:23

## 2024-11-19 RX ADMIN — HYDROCODONE BITARTRATE AND ACETAMINOPHEN 2 TABLET: 5; 325 TABLET ORAL at 00:33

## 2024-11-19 ASSESSMENT — PAIN DESCRIPTION - ORIENTATION
ORIENTATION: LOWER
ORIENTATION: RIGHT;LEFT;MID
ORIENTATION: LOWER
ORIENTATION: LOWER

## 2024-11-19 ASSESSMENT — PAIN DESCRIPTION - LOCATION
LOCATION: BACK
LOCATION: LEG;BACK
LOCATION: BACK
LOCATION: BACK

## 2024-11-19 ASSESSMENT — PAIN SCALES - GENERAL
PAINLEVEL_OUTOF10: 7
PAINLEVEL_OUTOF10: 7
PAINLEVEL_OUTOF10: 10
PAINLEVEL_OUTOF10: 7
PAINLEVEL_OUTOF10: 4
PAINLEVEL_OUTOF10: 6
PAINLEVEL_OUTOF10: 5

## 2024-11-19 ASSESSMENT — PAIN DESCRIPTION - FREQUENCY: FREQUENCY: INTERMITTENT

## 2024-11-19 ASSESSMENT — PAIN DESCRIPTION - DESCRIPTORS
DESCRIPTORS: ACHING;SHARP
DESCRIPTORS: ACHING

## 2024-11-19 ASSESSMENT — PAIN - FUNCTIONAL ASSESSMENT
PAIN_FUNCTIONAL_ASSESSMENT: ACTIVITIES ARE NOT PREVENTED

## 2024-11-19 ASSESSMENT — PAIN DESCRIPTION - PAIN TYPE
TYPE: ACUTE PAIN
TYPE: SURGICAL PAIN

## 2024-11-19 ASSESSMENT — PAIN DESCRIPTION - ONSET: ONSET: ON-GOING

## 2024-11-19 NOTE — CONSULTS
medication  Continue decadron  Consider Gabapentin  Rehab placement?      JAVI Nevarez    
foraminal stenosis. At L4-5, there are postoperative changes. There is mild canal and moderate bilateral foraminal stenosis. At L5-S1, there is mild canal and moderate bilateral foraminal stenosis. There are enhancing right 2 and L4 nerve roots. These may be inflamed.. There  is degenerative change involving the sacroiliac joints bilaterally.. There is a small right renal cyst.   1. Postoperative changes with bilateral pedicular screws at L2, L3, L4 and L5. Laminectomy defects at L2, L3 and L4. 2. There is mild canal and moderate bilateral foraminal stenosis at L4-5, L5-S1 and to a lesser extent at L1-2. 3. There is moderate bilateral foraminal stenosis with no canal stenosis at L3-4. 4. There are enhancing right L2 and L4 nerve roots which may be inflamed. 5. There is degenerative change involving the sacroiliac joints bilaterally. 6. There is a small right renal cyst.         Impression:  Severe lower back pain post lumbar spinal decompression and posterior spinal fusion.  Debility postop.  Hypertension  Obstructive sleep apnea on home CPAP    Recommendations:  Await therapy evaluations  Patient is a managed Medicare, without medical complexity, without worsening changes on her MRI, she is unlikely to get approved for an aggressive inpatient rehab program through her insurance  Recommendation for SNF.  Patient understanding  We will sign off    It was my pleasure to evaluate Terra Hills today.  Please call with questions.    Belinda Bustillo, REBECCA - CNP

## 2024-11-19 NOTE — CARE COORDINATION
Case Management Assessment Initial Evaluation    Date/Time of Evaluation: 11/19/2024 3:58 PM  Assessment Completed by: Abby Kramer RN    If patient is discharged prior to next notation, then this note serves as note for discharge by case management.    Patient Name: Terra Hills                   YOB: 1950  Diagnosis: Lumbar radiculopathy [M54.16]  Postoperative pain [G89.18]                   Date / Time: 11/18/2024  6:18 PM  Location: [address] [place] [address]     Patient Admission Status: Inpatient   Readmission Risk Low 0-14, Mod 15-19), High > 20: Readmission Risk Score: 12    Current PCP: Desirae Calhoun DO  Health Care Decision Makers:   Primary Decision Maker: Kirti Hills - Child - [phone]    Additional Case Management Notes: Admit from ER with pain following OP lumbar laminectomy with Dr Zaidi. Therapy ordered.     Patient Goals/Plan/Treatment Preferences: From home alone. Does not feel safe to return at this time. PM&R provider does not feel appropriate for IPR. SW consulted. Was current with [facility] for drain and incision care. Would like SNF.            11/19/24 3110   Service Assessment   Patient Orientation Alert and Oriented   Cognition Alert   History Provided By Patient   Primary Caregiver Self   Accompanied By/Relationship sister and brother in law   Support Systems Family Members;Children   Patient's Healthcare Decision Maker is: Legal Next of Kin   PCP Verified by CM Yes   Prior Functional Level Independent in ADLs/IADLs   Current Functional Level Assistance with the following:   Can patient return to prior living arrangement No   Ability to make needs known: Good   Family able to assist with home care needs: Yes   Would you like for me to discuss the discharge plan with any other family members/significant others, and if so, who? No   Financial Resources Medicare   Community Resources ECF/Home Care   Discharge Planning   Type of Residence House   Living Arrangements Alone

## 2024-11-19 NOTE — H&P
Internal Medicine  History and Physical    Patient:  Terra Hills  MRN: [id number]      History Obtained From:  patient  PCP: Desirae Calhoun DO    CHIEF COMPLAINT: Severe lower back pain, muscle spasms post lumbar decompression surgery.    HISTORY OF PRESENT ILLNESS:   The patient is a 74 y.o. female who presents with increasing pain in the lower back with severe muscle spasms in the last 5 days.  Patient had a lumbar spine decompression surgery done on 11/11/2024 discharged home on 11/13/2024.  Since discharge she is noted increasing pain in the lower back.  Pain radiates down both legs.  No legs weakness.  No bowel dysfunction.  No urinary incontinence.  She was unable to function at home because of the increasing pain that was came to emergency room yesterday evening.  Patient had MRI of the lumbar spine.  Reported postoperative changes with bilateral pedicle screws at L2-L3-L4 and L5 with laminectomy defects at L2-L3 and L4, mild residual canal and moderate bilateral foraminal stenosis at L4-L5 L5-S1 and L1-L2 and moderate bilateral foraminal stenosis with no canal stenosis at L3-L4.  Degenerative changes in the sacroiliac joints bilaterally also noted.  Patient was then admitted for pain relief,  further evaluation / PT OT.  Past Medical History:        Diagnosis Date    Abnormal liver function tests     Mildly.    Hyperlipidemia     Hypertension     Stable.    Insulin resistance     Macular degeneration of left eye 01/01/2013    Obstructive sleep apnea     Currently utilizing BiPAP and EPAP pressure 5, IPAP pressure 9.    Osteoarthritis     Thyroid nodule     per pt was bx and came back benign       Past Surgical History:        Procedure Laterality Date    ABDOMINAL EXPLORATION SURGERY  01/01/1994    With left salpingo-oophorectomy.    HYSTEROSCOPY  08/25/2005    With hysteroscopic resection of polyps and submucous fibroid performed by Dr. Cardona.    OTHER SURGICAL HISTORY  07/12/2005    Had intraductal

## 2024-11-19 NOTE — ED NOTES
Pt transferred to [facility]. Pt in stable condition. Floor staff notified.

## 2024-11-20 PROCEDURE — 6360000002 HC RX W HCPCS: Performed by: INTERNAL MEDICINE

## 2024-11-20 PROCEDURE — 1200000000 HC SEMI PRIVATE

## 2024-11-20 PROCEDURE — 6370000000 HC RX 637 (ALT 250 FOR IP): Performed by: INTERNAL MEDICINE

## 2024-11-20 PROCEDURE — 97110 THERAPEUTIC EXERCISES: CPT

## 2024-11-20 PROCEDURE — 97535 SELF CARE MNGMENT TRAINING: CPT

## 2024-11-20 PROCEDURE — 6370000000 HC RX 637 (ALT 250 FOR IP): Performed by: PHYSICIAN ASSISTANT

## 2024-11-20 PROCEDURE — 97530 THERAPEUTIC ACTIVITIES: CPT

## 2024-11-20 PROCEDURE — 2580000003 HC RX 258: Performed by: INTERNAL MEDICINE

## 2024-11-20 RX ADMIN — HYDROCODONE BITARTRATE AND ACETAMINOPHEN 1 TABLET: 5; 325 TABLET ORAL at 03:33

## 2024-11-20 RX ADMIN — METOPROLOL TARTRATE 25 MG: 25 TABLET, FILM COATED ORAL at 21:17

## 2024-11-20 RX ADMIN — DEXAMETHASONE SODIUM PHOSPHATE 8 MG: 10 INJECTION, EMULSION INTRAMUSCULAR; INTRAVENOUS at 12:26

## 2024-11-20 RX ADMIN — POLYETHYLENE GLYCOL 3350 17 G: 17 POWDER, FOR SOLUTION ORAL at 08:37

## 2024-11-20 RX ADMIN — HYDROCODONE BITARTRATE AND ACETAMINOPHEN 2 TABLET: 5; 325 TABLET ORAL at 16:26

## 2024-11-20 RX ADMIN — HYDROCODONE BITARTRATE AND ACETAMINOPHEN 2 TABLET: 5; 325 TABLET ORAL at 21:17

## 2024-11-20 RX ADMIN — FAMOTIDINE 20 MG: 20 TABLET, FILM COATED ORAL at 21:18

## 2024-11-20 RX ADMIN — HYDROCODONE BITARTRATE AND ACETAMINOPHEN 2 TABLET: 5; 325 TABLET ORAL at 12:24

## 2024-11-20 RX ADMIN — ROSUVASTATIN CALCIUM 20 MG: 20 TABLET, FILM COATED ORAL at 21:17

## 2024-11-20 RX ADMIN — SODIUM CHLORIDE, PRESERVATIVE FREE 10 ML: 5 INJECTION INTRAVENOUS at 21:18

## 2024-11-20 RX ADMIN — HYDROCODONE BITARTRATE AND ACETAMINOPHEN 2 TABLET: 5; 325 TABLET ORAL at 08:13

## 2024-11-20 RX ADMIN — METOPROLOL TARTRATE 25 MG: 25 TABLET, FILM COATED ORAL at 08:38

## 2024-11-20 RX ADMIN — Medication 1 TABLET: at 08:38

## 2024-11-20 RX ADMIN — FAMOTIDINE 20 MG: 20 TABLET, FILM COATED ORAL at 08:37

## 2024-11-20 RX ADMIN — ENOXAPARIN SODIUM 40 MG: 100 INJECTION SUBCUTANEOUS at 08:37

## 2024-11-20 RX ADMIN — DEXAMETHASONE SODIUM PHOSPHATE 8 MG: 10 INJECTION, EMULSION INTRAMUSCULAR; INTRAVENOUS at 21:17

## 2024-11-20 RX ADMIN — DOCUSATE SODIUM 100 MG: 100 CAPSULE, LIQUID FILLED ORAL at 08:37

## 2024-11-20 RX ADMIN — DEXAMETHASONE SODIUM PHOSPHATE 8 MG: 10 INJECTION, EMULSION INTRAMUSCULAR; INTRAVENOUS at 05:16

## 2024-11-20 RX ADMIN — AMLODIPINE BESYLATE 5 MG: 5 TABLET ORAL at 08:38

## 2024-11-20 RX ADMIN — SODIUM CHLORIDE, PRESERVATIVE FREE 10 ML: 5 INJECTION INTRAVENOUS at 08:38

## 2024-11-20 RX ADMIN — SENNOSIDES AND DOCUSATE SODIUM 2 TABLET: 50; 8.6 TABLET ORAL at 08:37

## 2024-11-20 ASSESSMENT — PAIN DESCRIPTION - ORIENTATION
ORIENTATION: RIGHT;LEFT;LOWER
ORIENTATION: LOWER
ORIENTATION: MID

## 2024-11-20 ASSESSMENT — PAIN DESCRIPTION - LOCATION
LOCATION: BACK
LOCATION: BACK
LOCATION: BACK;LEG
LOCATION: BACK

## 2024-11-20 ASSESSMENT — PAIN - FUNCTIONAL ASSESSMENT: PAIN_FUNCTIONAL_ASSESSMENT: ACTIVITIES ARE NOT PREVENTED

## 2024-11-20 ASSESSMENT — PAIN DESCRIPTION - PAIN TYPE
TYPE: SURGICAL PAIN
TYPE: ACUTE PAIN

## 2024-11-20 ASSESSMENT — PAIN SCALES - GENERAL
PAINLEVEL_OUTOF10: 6
PAINLEVEL_OUTOF10: 7
PAINLEVEL_OUTOF10: 4
PAINLEVEL_OUTOF10: 7
PAINLEVEL_OUTOF10: 2

## 2024-11-20 ASSESSMENT — PAIN DESCRIPTION - FREQUENCY: FREQUENCY: INTERMITTENT

## 2024-11-20 ASSESSMENT — PAIN DESCRIPTION - DESCRIPTORS
DESCRIPTORS: ACHING
DESCRIPTORS: ACHING

## 2024-11-20 ASSESSMENT — PAIN DESCRIPTION - ONSET: ONSET: ON-GOING

## 2024-11-20 NOTE — CARE COORDINATION
11/20/24, 2:45 PM EST    DISCHARGE ON GOING EVALUATION    Terra [facility] day: 2  Location: -27/027-A Reason for admit: Lumbar radiculopathy [M54.16]  Postoperative pain [G89.18]     Imaging since last note:   11/19 CXR: Minimal atelectasis/pneumonia in the lingula and right middle lobe.     Barriers to Discharge: Hospitalist and ortho surgery following. IV decadron. PO pain control. POD 9 s/p L2-5 decompression and fusion prior to admission. PT/OT. Daily labs.     PCP: Desirae Calhoun, DO  Readmission Risk Score: 11.2    Patient Goals/Plan/Treatment Preferences: From home alone w [facility]. IPR declines. SW made referral to [facility]- await decision. Will need precert.

## 2024-11-20 NOTE — CARE COORDINATION
DISCHARGE PLANNING EVALUATION  11/20/24, 11:12 AM EST    Reason for Referral: snf  Decision Maker: makes own decisions, no POA, daughter and son are next of kin  Current Services: none  New Services Requested:  snf for rehab  Family/ Social/ Home environment: Terra lives at home alone, has family support but does not have 24 hr assistance.  She is requesting snf for rehab before returning home  Payment Source: [employer] medicare   Transportation at Discharge: undetermined  Post-acute (PAC) provider list was provided to patient. Patient was informed of their freedom to choose PAC provider. Discussed and offered to show the patient the relevant PAC Providers quality and resource use measures on Medicare Compare web site via computer based on patient's goals of care and treatment preferences. Questions regarding selection process were answered.      Teach Back Method used with patient regarding care plan and discharge plan  Patient  verbalized understanding of the plan of care and contribute to goal setting.       Patient preferences and discharge plan:  spoke with patient about discharge plan.  She is requesting [facility] for rehab before  returning home alone.  Referral made to [facility], facility is reviewing, will start precert if accepting.     Electronically signed by REFUGIO Kitchen on 11/20/2024 at 11:12 AM

## 2024-11-21 PROCEDURE — 97535 SELF CARE MNGMENT TRAINING: CPT

## 2024-11-21 PROCEDURE — 6360000002 HC RX W HCPCS: Performed by: INTERNAL MEDICINE

## 2024-11-21 PROCEDURE — 6370000000 HC RX 637 (ALT 250 FOR IP): Performed by: PHYSICIAN ASSISTANT

## 2024-11-21 PROCEDURE — 1200000000 HC SEMI PRIVATE

## 2024-11-21 PROCEDURE — 2580000003 HC RX 258: Performed by: INTERNAL MEDICINE

## 2024-11-21 PROCEDURE — 6370000000 HC RX 637 (ALT 250 FOR IP): Performed by: INTERNAL MEDICINE

## 2024-11-21 PROCEDURE — 97530 THERAPEUTIC ACTIVITIES: CPT

## 2024-11-21 PROCEDURE — 97116 GAIT TRAINING THERAPY: CPT

## 2024-11-21 RX ORDER — DEXAMETHASONE SODIUM PHOSPHATE 4 MG/ML
6 INJECTION, SOLUTION INTRA-ARTICULAR; INTRALESIONAL; INTRAMUSCULAR; INTRAVENOUS; SOFT TISSUE EVERY 8 HOURS
Status: DISCONTINUED | OUTPATIENT
Start: 2024-11-21 | End: 2024-11-21

## 2024-11-21 RX ORDER — DEXAMETHASONE SODIUM PHOSPHATE 4 MG/ML
6 INJECTION, SOLUTION INTRA-ARTICULAR; INTRALESIONAL; INTRAMUSCULAR; INTRAVENOUS; SOFT TISSUE EVERY 8 HOURS
Status: COMPLETED | OUTPATIENT
Start: 2024-11-21 | End: 2024-11-22

## 2024-11-21 RX ADMIN — HYDROCODONE BITARTRATE AND ACETAMINOPHEN 1 TABLET: 5; 325 TABLET ORAL at 20:37

## 2024-11-21 RX ADMIN — DEXAMETHASONE SODIUM PHOSPHATE 6 MG: 4 INJECTION, SOLUTION INTRA-ARTICULAR; INTRALESIONAL; INTRAMUSCULAR; INTRAVENOUS; SOFT TISSUE at 14:58

## 2024-11-21 RX ADMIN — ENOXAPARIN SODIUM 40 MG: 100 INJECTION SUBCUTANEOUS at 08:42

## 2024-11-21 RX ADMIN — POLYETHYLENE GLYCOL 3350 17 G: 17 POWDER, FOR SOLUTION ORAL at 12:39

## 2024-11-21 RX ADMIN — DOCUSATE SODIUM 100 MG: 100 CAPSULE, LIQUID FILLED ORAL at 08:41

## 2024-11-21 RX ADMIN — METOPROLOL TARTRATE 25 MG: 25 TABLET, FILM COATED ORAL at 20:37

## 2024-11-21 RX ADMIN — ROSUVASTATIN CALCIUM 20 MG: 20 TABLET, FILM COATED ORAL at 20:37

## 2024-11-21 RX ADMIN — FAMOTIDINE 20 MG: 20 TABLET, FILM COATED ORAL at 20:37

## 2024-11-21 RX ADMIN — METOPROLOL TARTRATE 25 MG: 25 TABLET, FILM COATED ORAL at 08:41

## 2024-11-21 RX ADMIN — AMLODIPINE BESYLATE 5 MG: 5 TABLET ORAL at 08:41

## 2024-11-21 RX ADMIN — SENNOSIDES AND DOCUSATE SODIUM 2 TABLET: 50; 8.6 TABLET ORAL at 08:46

## 2024-11-21 RX ADMIN — SODIUM CHLORIDE, PRESERVATIVE FREE 10 ML: 5 INJECTION INTRAVENOUS at 20:39

## 2024-11-21 RX ADMIN — MORPHINE SULFATE 2 MG: 2 INJECTION, SOLUTION INTRAMUSCULAR; INTRAVENOUS at 08:42

## 2024-11-21 RX ADMIN — DEXAMETHASONE SODIUM PHOSPHATE 8 MG: 10 INJECTION, EMULSION INTRAMUSCULAR; INTRAVENOUS at 03:55

## 2024-11-21 RX ADMIN — DEXAMETHASONE SODIUM PHOSPHATE 6 MG: 4 INJECTION, SOLUTION INTRA-ARTICULAR; INTRALESIONAL; INTRAMUSCULAR; INTRAVENOUS; SOFT TISSUE at 20:39

## 2024-11-21 RX ADMIN — SODIUM CHLORIDE, PRESERVATIVE FREE 10 ML: 5 INJECTION INTRAVENOUS at 14:58

## 2024-11-21 RX ADMIN — Medication 1 TABLET: at 08:41

## 2024-11-21 RX ADMIN — FAMOTIDINE 20 MG: 20 TABLET, FILM COATED ORAL at 08:41

## 2024-11-21 RX ADMIN — SODIUM CHLORIDE, PRESERVATIVE FREE 10 ML: 5 INJECTION INTRAVENOUS at 08:42

## 2024-11-21 ASSESSMENT — PAIN DESCRIPTION - DESCRIPTORS
DESCRIPTORS: ACHING

## 2024-11-21 ASSESSMENT — PAIN DESCRIPTION - ONSET
ONSET: ON-GOING
ONSET: ON-GOING

## 2024-11-21 ASSESSMENT — PAIN SCALES - GENERAL
PAINLEVEL_OUTOF10: 4
PAINLEVEL_OUTOF10: 4
PAINLEVEL_OUTOF10: 5
PAINLEVEL_OUTOF10: 4
PAINLEVEL_OUTOF10: 4
PAINLEVEL_OUTOF10: 6
PAINLEVEL_OUTOF10: 6

## 2024-11-21 ASSESSMENT — PAIN DESCRIPTION - PAIN TYPE
TYPE: ACUTE PAIN

## 2024-11-21 ASSESSMENT — PAIN DESCRIPTION - DIRECTION
RADIATING_TOWARDS: HIPS
RADIATING_TOWARDS: HIPS

## 2024-11-21 ASSESSMENT — PAIN - FUNCTIONAL ASSESSMENT
PAIN_FUNCTIONAL_ASSESSMENT: ACTIVITIES ARE NOT PREVENTED

## 2024-11-21 ASSESSMENT — PAIN DESCRIPTION - ORIENTATION
ORIENTATION: LOWER

## 2024-11-21 ASSESSMENT — PAIN DESCRIPTION - FREQUENCY
FREQUENCY: CONTINUOUS
FREQUENCY: CONTINUOUS

## 2024-11-21 ASSESSMENT — PAIN DESCRIPTION - LOCATION
LOCATION: BACK
LOCATION: BACK;HIP
LOCATION: BACK

## 2024-11-21 ASSESSMENT — PAIN SCALES - WONG BAKER: WONGBAKER_NUMERICALRESPONSE: HURTS LITTLE MORE

## 2024-11-21 NOTE — DISCHARGE INSTR - COC
Continuity of Care Form    Patient Name: Terra Hills   :  1950  MRN:  [id number]    Admit date:  2024  Discharge date:  2024    Code Status Order: Full Code   Advance Directives:   Advance Care Flowsheet Documentation             Admitting Physician:  Renetta Ceballos MD  PCP: Desirae Calhoun DO    Discharging Nurse: Gisselle danielle  Discharging Hospital Unit/Room#: 7K-27/027-A  Discharging Unit Phone Number: [phone]    Emergency Contact:   Extended Emergency Contact Information  Primary Emergency Contact: Kirti Hills   [facility]  Home Phone: [phone]  Relation: Child  Secondary Emergency Contact: garry hills  Mobile Phone: [phone]  Relation: Child    Past Surgical History:  Past Surgical History:   Procedure Laterality Date    ABDOMINAL EXPLORATION SURGERY  1994    With left salpingo-oophorectomy.    HYSTEROSCOPY  2005    With hysteroscopic resection of polyps and submucous fibroid performed by Dr. Cardona.    OTHER SURGICAL HISTORY  2005    Had intraductal papilloma of the right breast excised.    OVARY REMOVAL Left     TOTAL HIP ARTHROPLASTY Left 2024    HIP TOTAL ARTHROPLASTY ANTERIOR APPROACH performed by Nathaniel Estrella DO at [facility] OR       Immunization History:   Immunization History   Administered Date(s) Administered    COVID-19, MODERNA BLUE border, Primary or Immunocompromised, (age 12y+), IM, 100 mcg/0.5mL 2021, 2021, 10/26/2021, 2022    COVID-19, MODERNA, (age 12y+), IM, 50mcg/0.5mL 2023    COVID-19, PFIZER Bivalent, DO NOT Dilute, (age 12y+), IM, 30 mcg/0.3 mL 2022    Influenza, FLUAD, (age 65 y+), IM, Quadv, 0.5mL 10/07/2021, 10/29/2022    Influenza, FLUAD, (age 65 y+), IM, Trivalent PF, 0.5mL 2019    Influenza, FLUCELVAX, (age 6 mo+), MDCK, Quadv PF, 0.5mL 2020    Influenza, FLUZONE High Dose (age 65 y+), IM, Quadv, 0.7mL 10/21/2023    Influenza, FLUZONE High Dose, (age 65 y+),

## 2024-11-21 NOTE — CARE COORDINATION
11/21/24, 9:13 AM EST    DISCHARGE PLANNING EVALUATION    [facility] will accept and has started precert.  Discussed with patient, who is confirms this is her preference.  She plans for family to transport at discharge .

## 2024-11-22 PROCEDURE — 2580000003 HC RX 258: Performed by: INTERNAL MEDICINE

## 2024-11-22 PROCEDURE — 97110 THERAPEUTIC EXERCISES: CPT

## 2024-11-22 PROCEDURE — 97530 THERAPEUTIC ACTIVITIES: CPT

## 2024-11-22 PROCEDURE — 97116 GAIT TRAINING THERAPY: CPT

## 2024-11-22 PROCEDURE — 1200000000 HC SEMI PRIVATE

## 2024-11-22 PROCEDURE — 6360000002 HC RX W HCPCS: Performed by: INTERNAL MEDICINE

## 2024-11-22 PROCEDURE — 97535 SELF CARE MNGMENT TRAINING: CPT

## 2024-11-22 PROCEDURE — 6370000000 HC RX 637 (ALT 250 FOR IP): Performed by: INTERNAL MEDICINE

## 2024-11-22 PROCEDURE — 6370000000 HC RX 637 (ALT 250 FOR IP): Performed by: PHYSICIAN ASSISTANT

## 2024-11-22 RX ORDER — HYDROCHLOROTHIAZIDE 25 MG/1
25 TABLET ORAL EVERY MORNING
Status: DISCONTINUED | OUTPATIENT
Start: 2024-11-22 | End: 2024-11-23

## 2024-11-22 RX ORDER — LISINOPRIL 20 MG/1
20 TABLET ORAL DAILY
Status: DISCONTINUED | OUTPATIENT
Start: 2024-11-22 | End: 2024-11-23

## 2024-11-22 RX ORDER — METOPROLOL TARTRATE 50 MG
50 TABLET ORAL 2 TIMES DAILY
Status: DISCONTINUED | OUTPATIENT
Start: 2024-11-22 | End: 2024-11-23

## 2024-11-22 RX ADMIN — HYDROCODONE BITARTRATE AND ACETAMINOPHEN 1 TABLET: 5; 325 TABLET ORAL at 05:32

## 2024-11-22 RX ADMIN — ENOXAPARIN SODIUM 40 MG: 100 INJECTION SUBCUTANEOUS at 08:52

## 2024-11-22 RX ADMIN — Medication 1 TABLET: at 08:52

## 2024-11-22 RX ADMIN — HYDROCODONE BITARTRATE AND ACETAMINOPHEN 1 TABLET: 5; 325 TABLET ORAL at 20:53

## 2024-11-22 RX ADMIN — METOPROLOL TARTRATE 50 MG: 50 TABLET, FILM COATED ORAL at 20:51

## 2024-11-22 RX ADMIN — HYDROCODONE BITARTRATE AND ACETAMINOPHEN 1 TABLET: 5; 325 TABLET ORAL at 11:24

## 2024-11-22 RX ADMIN — FAMOTIDINE 20 MG: 20 TABLET, FILM COATED ORAL at 20:51

## 2024-11-22 RX ADMIN — SENNOSIDES AND DOCUSATE SODIUM 2 TABLET: 50; 8.6 TABLET ORAL at 08:52

## 2024-11-22 RX ADMIN — LISINOPRIL 20 MG: 20 TABLET ORAL at 14:16

## 2024-11-22 RX ADMIN — AMLODIPINE BESYLATE 5 MG: 5 TABLET ORAL at 08:52

## 2024-11-22 RX ADMIN — DEXAMETHASONE SODIUM PHOSPHATE 6 MG: 4 INJECTION, SOLUTION INTRA-ARTICULAR; INTRALESIONAL; INTRAMUSCULAR; INTRAVENOUS; SOFT TISSUE at 05:34

## 2024-11-22 RX ADMIN — DOCUSATE SODIUM 100 MG: 100 CAPSULE, LIQUID FILLED ORAL at 08:52

## 2024-11-22 RX ADMIN — DIAZEPAM 5 MG: 5 TABLET ORAL at 15:59

## 2024-11-22 RX ADMIN — HYDROCHLOROTHIAZIDE 25 MG: 25 TABLET ORAL at 14:15

## 2024-11-22 RX ADMIN — FAMOTIDINE 20 MG: 20 TABLET, FILM COATED ORAL at 08:52

## 2024-11-22 RX ADMIN — SODIUM CHLORIDE, PRESERVATIVE FREE 10 ML: 5 INJECTION INTRAVENOUS at 08:52

## 2024-11-22 RX ADMIN — METOPROLOL TARTRATE 25 MG: 25 TABLET, FILM COATED ORAL at 08:52

## 2024-11-22 RX ADMIN — SODIUM CHLORIDE, PRESERVATIVE FREE 10 ML: 5 INJECTION INTRAVENOUS at 20:51

## 2024-11-22 RX ADMIN — ROSUVASTATIN CALCIUM 20 MG: 20 TABLET, FILM COATED ORAL at 20:51

## 2024-11-22 ASSESSMENT — PAIN DESCRIPTION - LOCATION
LOCATION: BACK
LOCATION: LEG
LOCATION: BACK

## 2024-11-22 ASSESSMENT — PAIN - FUNCTIONAL ASSESSMENT
PAIN_FUNCTIONAL_ASSESSMENT: ACTIVITIES ARE NOT PREVENTED

## 2024-11-22 ASSESSMENT — PAIN DESCRIPTION - FREQUENCY
FREQUENCY: CONTINUOUS
FREQUENCY: CONTINUOUS

## 2024-11-22 ASSESSMENT — PAIN SCALES - GENERAL
PAINLEVEL_OUTOF10: 2
PAINLEVEL_OUTOF10: 6
PAINLEVEL_OUTOF10: 3
PAINLEVEL_OUTOF10: 8
PAINLEVEL_OUTOF10: 4
PAINLEVEL_OUTOF10: 0
PAINLEVEL_OUTOF10: 6
PAINLEVEL_OUTOF10: 6

## 2024-11-22 ASSESSMENT — PAIN DESCRIPTION - ORIENTATION
ORIENTATION: LOWER;MID
ORIENTATION: LOWER;MID
ORIENTATION: RIGHT
ORIENTATION: MID
ORIENTATION: LOWER;MID

## 2024-11-22 ASSESSMENT — PAIN DESCRIPTION - PAIN TYPE
TYPE: ACUTE PAIN
TYPE: ACUTE PAIN;SURGICAL PAIN
TYPE: ACUTE PAIN;SURGICAL PAIN

## 2024-11-22 ASSESSMENT — PAIN DESCRIPTION - ONSET
ONSET: ON-GOING
ONSET: ON-GOING

## 2024-11-22 ASSESSMENT — PAIN DESCRIPTION - DESCRIPTORS
DESCRIPTORS: ACHING

## 2024-11-22 NOTE — CARE COORDINATION
11/22/24, 2:58 PM EST    DISCHARGE ON GOING EVALUATION    Terra [facility] day: 4  Location: -27/027-A Reason for admit: Lumbar radiculopathy [M54.16]  Postoperative pain [G89.18]     Procedures: none this admission. Admitted s/p L2-L5 decompression and fusion.     Imaging since last note: no new    Barriers to Discharge: Ready for discharge, await precert. PT/OT, daily labs. PRN PO pain meds.     PCP: Desirae Calhoun,   Readmission Risk Score: 9.3    Patient Goals/Plan/Treatment Preferences: Await precert return for [facility]. If precert comes back this weekend, patient can discharge from CM dept's perspective, pending physician order.

## 2024-11-22 NOTE — CARE COORDINATION
11/22/24, 2:20 PM EST    DISCHARGE PLANNING EVALUATION    Spoke with patient about insurance precert for snf, assured her that snf has submitted precert request through appropriate protocol and they will accept once approved.  Confirmed with [facility] that they have submitted and are waiting approval.  Pt is concerned that her insurance told her it can take 14 days, assured her that this precert will not take 14 days and the latest for a response should be Monday and could be yet today or the weekend.

## 2024-11-23 LAB
ANION GAP SERPL CALC-SCNC: 8 MEQ/L (ref 8–16)
BUN SERPL-MCNC: 27 MG/DL (ref 7–22)
CALCIUM SERPL-MCNC: 8.8 MG/DL (ref 8.5–10.5)
CHLORIDE SERPL-SCNC: 102 MEQ/L (ref 98–111)
CO2 SERPL-SCNC: 25 MEQ/L (ref 23–33)
CREAT SERPL-MCNC: 0.6 MG/DL (ref 0.4–1.2)
DEPRECATED RDW RBC AUTO: 47.1 FL (ref 35–45)
ERYTHROCYTE [DISTWIDTH] IN BLOOD BY AUTOMATED COUNT: 14.6 % (ref 11.5–14.5)
GFR SERPL CREATININE-BSD FRML MDRD: > 90 ML/MIN/1.73M2
GLUCOSE SERPL-MCNC: 107 MG/DL (ref 70–108)
HCT VFR BLD AUTO: 35.3 % (ref 37–47)
HGB BLD-MCNC: 11.4 GM/DL (ref 12–16)
MCH RBC QN AUTO: 29 PG (ref 26–33)
MCHC RBC AUTO-ENTMCNC: 32.3 GM/DL (ref 32.2–35.5)
MCV RBC AUTO: 89.8 FL (ref 81–99)
PLATELET # BLD AUTO: 197 THOU/MM3 (ref 130–400)
PMV BLD AUTO: 10.2 FL (ref 9.4–12.4)
POTASSIUM SERPL-SCNC: 4.2 MEQ/L (ref 3.5–5.2)
RBC # BLD AUTO: 3.93 MILL/MM3 (ref 4.2–5.4)
SODIUM SERPL-SCNC: 135 MEQ/L (ref 135–145)
WBC # BLD AUTO: 6 THOU/MM3 (ref 4.8–10.8)

## 2024-11-23 PROCEDURE — 1200000000 HC SEMI PRIVATE

## 2024-11-23 PROCEDURE — 6370000000 HC RX 637 (ALT 250 FOR IP): Performed by: PHYSICIAN ASSISTANT

## 2024-11-23 PROCEDURE — 36415 COLL VENOUS BLD VENIPUNCTURE: CPT

## 2024-11-23 PROCEDURE — 80048 BASIC METABOLIC PNL TOTAL CA: CPT

## 2024-11-23 PROCEDURE — 6370000000 HC RX 637 (ALT 250 FOR IP): Performed by: INTERNAL MEDICINE

## 2024-11-23 PROCEDURE — 85027 COMPLETE CBC AUTOMATED: CPT

## 2024-11-23 PROCEDURE — 6360000002 HC RX W HCPCS: Performed by: INTERNAL MEDICINE

## 2024-11-23 PROCEDURE — 2580000003 HC RX 258: Performed by: INTERNAL MEDICINE

## 2024-11-23 RX ORDER — METOPROLOL TARTRATE 25 MG/1
25 TABLET, FILM COATED ORAL 2 TIMES DAILY
Status: DISCONTINUED | OUTPATIENT
Start: 2024-11-23 | End: 2024-11-25 | Stop reason: HOSPADM

## 2024-11-23 RX ADMIN — ROSUVASTATIN CALCIUM 20 MG: 20 TABLET, FILM COATED ORAL at 20:13

## 2024-11-23 RX ADMIN — DIAZEPAM 5 MG: 5 TABLET ORAL at 16:11

## 2024-11-23 RX ADMIN — METOPROLOL TARTRATE 25 MG: 50 TABLET, FILM COATED ORAL at 20:13

## 2024-11-23 RX ADMIN — FAMOTIDINE 20 MG: 20 TABLET, FILM COATED ORAL at 20:13

## 2024-11-23 RX ADMIN — ENOXAPARIN SODIUM 40 MG: 100 INJECTION SUBCUTANEOUS at 08:35

## 2024-11-23 RX ADMIN — HYDROCODONE BITARTRATE AND ACETAMINOPHEN 2 TABLET: 5; 325 TABLET ORAL at 11:24

## 2024-11-23 RX ADMIN — HYDROCODONE BITARTRATE AND ACETAMINOPHEN 1 TABLET: 5; 325 TABLET ORAL at 07:23

## 2024-11-23 RX ADMIN — SODIUM CHLORIDE, PRESERVATIVE FREE 10 ML: 5 INJECTION INTRAVENOUS at 20:13

## 2024-11-23 RX ADMIN — DOCUSATE SODIUM 100 MG: 100 CAPSULE, LIQUID FILLED ORAL at 08:42

## 2024-11-23 RX ADMIN — SENNOSIDES AND DOCUSATE SODIUM 2 TABLET: 50; 8.6 TABLET ORAL at 08:42

## 2024-11-23 RX ADMIN — SODIUM CHLORIDE, PRESERVATIVE FREE 10 ML: 5 INJECTION INTRAVENOUS at 08:38

## 2024-11-23 RX ADMIN — FAMOTIDINE 20 MG: 20 TABLET, FILM COATED ORAL at 08:35

## 2024-11-23 RX ADMIN — DIAZEPAM 5 MG: 5 TABLET ORAL at 08:42

## 2024-11-23 RX ADMIN — HYDROCODONE BITARTRATE AND ACETAMINOPHEN 1 TABLET: 5; 325 TABLET ORAL at 16:11

## 2024-11-23 RX ADMIN — HYDROCODONE BITARTRATE AND ACETAMINOPHEN 2 TABLET: 5; 325 TABLET ORAL at 20:13

## 2024-11-23 RX ADMIN — POLYETHYLENE GLYCOL 3350 17 G: 17 POWDER, FOR SOLUTION ORAL at 08:42

## 2024-11-23 RX ADMIN — Medication 1 TABLET: at 20:13

## 2024-11-23 ASSESSMENT — PAIN SCALES - GENERAL
PAINLEVEL_OUTOF10: 6
PAINLEVEL_OUTOF10: 7
PAINLEVEL_OUTOF10: 3
PAINLEVEL_OUTOF10: 7
PAINLEVEL_OUTOF10: 6
PAINLEVEL_OUTOF10: 5
PAINLEVEL_OUTOF10: 4
PAINLEVEL_OUTOF10: 5
PAINLEVEL_OUTOF10: 7
PAINLEVEL_OUTOF10: 6
PAINLEVEL_OUTOF10: 5
PAINLEVEL_OUTOF10: 4

## 2024-11-23 ASSESSMENT — PAIN DESCRIPTION - LOCATION
LOCATION: BACK;LEG
LOCATION: BACK
LOCATION: BACK;LEG

## 2024-11-23 ASSESSMENT — PAIN DESCRIPTION - DIRECTION: RADIATING_TOWARDS: RIGHT LEG

## 2024-11-23 ASSESSMENT — PAIN DESCRIPTION - FREQUENCY: FREQUENCY: CONTINUOUS

## 2024-11-23 ASSESSMENT — PAIN DESCRIPTION - PAIN TYPE: TYPE: ACUTE PAIN

## 2024-11-23 ASSESSMENT — PAIN DESCRIPTION - DESCRIPTORS
DESCRIPTORS: ACHING

## 2024-11-23 ASSESSMENT — PAIN DESCRIPTION - ORIENTATION
ORIENTATION: RIGHT
ORIENTATION: LOWER
ORIENTATION: LOWER
ORIENTATION: RIGHT

## 2024-11-23 ASSESSMENT — PAIN DESCRIPTION - ONSET: ONSET: ON-GOING

## 2024-11-23 ASSESSMENT — PAIN - FUNCTIONAL ASSESSMENT: PAIN_FUNCTIONAL_ASSESSMENT: PREVENTS OR INTERFERES SOME ACTIVE ACTIVITIES AND ADLS

## 2024-11-24 PROCEDURE — 2580000003 HC RX 258: Performed by: INTERNAL MEDICINE

## 2024-11-24 PROCEDURE — 6370000000 HC RX 637 (ALT 250 FOR IP): Performed by: PHYSICIAN ASSISTANT

## 2024-11-24 PROCEDURE — 6360000002 HC RX W HCPCS: Performed by: INTERNAL MEDICINE

## 2024-11-24 PROCEDURE — 1200000000 HC SEMI PRIVATE

## 2024-11-24 PROCEDURE — 6370000000 HC RX 637 (ALT 250 FOR IP): Performed by: INTERNAL MEDICINE

## 2024-11-24 RX ADMIN — FAMOTIDINE 20 MG: 20 TABLET, FILM COATED ORAL at 08:54

## 2024-11-24 RX ADMIN — HYDROCODONE BITARTRATE AND ACETAMINOPHEN 2 TABLET: 5; 325 TABLET ORAL at 08:54

## 2024-11-24 RX ADMIN — HYDROCODONE BITARTRATE AND ACETAMINOPHEN 2 TABLET: 5; 325 TABLET ORAL at 03:18

## 2024-11-24 RX ADMIN — METOPROLOL TARTRATE 25 MG: 50 TABLET, FILM COATED ORAL at 08:48

## 2024-11-24 RX ADMIN — SENNOSIDES AND DOCUSATE SODIUM 2 TABLET: 50; 8.6 TABLET ORAL at 08:54

## 2024-11-24 RX ADMIN — DOCUSATE SODIUM 100 MG: 100 CAPSULE, LIQUID FILLED ORAL at 08:54

## 2024-11-24 RX ADMIN — HYDROCODONE BITARTRATE AND ACETAMINOPHEN 2 TABLET: 5; 325 TABLET ORAL at 13:24

## 2024-11-24 RX ADMIN — ROSUVASTATIN CALCIUM 20 MG: 20 TABLET, FILM COATED ORAL at 21:11

## 2024-11-24 RX ADMIN — HYDROCODONE BITARTRATE AND ACETAMINOPHEN 2 TABLET: 5; 325 TABLET ORAL at 17:27

## 2024-11-24 RX ADMIN — Medication 1 TABLET: at 08:48

## 2024-11-24 RX ADMIN — METOPROLOL TARTRATE 25 MG: 50 TABLET, FILM COATED ORAL at 21:11

## 2024-11-24 RX ADMIN — DIAZEPAM 5 MG: 5 TABLET ORAL at 06:46

## 2024-11-24 RX ADMIN — SODIUM CHLORIDE, PRESERVATIVE FREE 10 ML: 5 INJECTION INTRAVENOUS at 08:48

## 2024-11-24 RX ADMIN — ENOXAPARIN SODIUM 40 MG: 100 INJECTION SUBCUTANEOUS at 08:55

## 2024-11-24 RX ADMIN — SODIUM CHLORIDE, PRESERVATIVE FREE 5 ML: 5 INJECTION INTRAVENOUS at 21:11

## 2024-11-24 RX ADMIN — HYDROCODONE BITARTRATE AND ACETAMINOPHEN 2 TABLET: 5; 325 TABLET ORAL at 21:48

## 2024-11-24 RX ADMIN — FAMOTIDINE 20 MG: 20 TABLET, FILM COATED ORAL at 21:11

## 2024-11-24 ASSESSMENT — PAIN SCALES - GENERAL
PAINLEVEL_OUTOF10: 7
PAINLEVEL_OUTOF10: 5
PAINLEVEL_OUTOF10: 7
PAINLEVEL_OUTOF10: 4
PAINLEVEL_OUTOF10: 7
PAINLEVEL_OUTOF10: 5
PAINLEVEL_OUTOF10: 4
PAINLEVEL_OUTOF10: 3
PAINLEVEL_OUTOF10: 8
PAINLEVEL_OUTOF10: 7
PAINLEVEL_OUTOF10: 4
PAINLEVEL_OUTOF10: 5

## 2024-11-24 ASSESSMENT — PAIN DESCRIPTION - LOCATION
LOCATION: ABDOMEN
LOCATION: BACK
LOCATION: BACK;HIP
LOCATION: HIP;BACK
LOCATION: BACK;HIP
LOCATION: HIP
LOCATION: BACK;HIP

## 2024-11-24 ASSESSMENT — PAIN DESCRIPTION - ORIENTATION
ORIENTATION: RIGHT
ORIENTATION: LEFT;RIGHT;LOWER
ORIENTATION: LOWER;LEFT;RIGHT
ORIENTATION: LOWER;RIGHT;LEFT
ORIENTATION: RIGHT;LEFT

## 2024-11-24 ASSESSMENT — PAIN DESCRIPTION - DESCRIPTORS
DESCRIPTORS: ACHING

## 2024-11-24 ASSESSMENT — PAIN SCALES - WONG BAKER: WONGBAKER_NUMERICALRESPONSE: NO HURT

## 2024-11-24 ASSESSMENT — PAIN - FUNCTIONAL ASSESSMENT
PAIN_FUNCTIONAL_ASSESSMENT: PREVENTS OR INTERFERES SOME ACTIVE ACTIVITIES AND ADLS
PAIN_FUNCTIONAL_ASSESSMENT: PREVENTS OR INTERFERES SOME ACTIVE ACTIVITIES AND ADLS

## 2024-11-25 VITALS
HEIGHT: 62 IN | TEMPERATURE: 98.6 F | RESPIRATION RATE: 18 BRPM | SYSTOLIC BLOOD PRESSURE: 131 MMHG | OXYGEN SATURATION: 99 % | DIASTOLIC BLOOD PRESSURE: 66 MMHG | WEIGHT: 195 LBS | HEART RATE: 99 BPM | BODY MASS INDEX: 35.88 KG/M2

## 2024-11-25 PROCEDURE — 2580000003 HC RX 258: Performed by: INTERNAL MEDICINE

## 2024-11-25 PROCEDURE — 6370000000 HC RX 637 (ALT 250 FOR IP): Performed by: INTERNAL MEDICINE

## 2024-11-25 PROCEDURE — 97535 SELF CARE MNGMENT TRAINING: CPT

## 2024-11-25 PROCEDURE — 6360000002 HC RX W HCPCS: Performed by: INTERNAL MEDICINE

## 2024-11-25 PROCEDURE — 6370000000 HC RX 637 (ALT 250 FOR IP): Performed by: PHYSICIAN ASSISTANT

## 2024-11-25 RX ORDER — ENOXAPARIN SODIUM 100 MG/ML
40 INJECTION SUBCUTANEOUS DAILY
DISCHARGE
Start: 2024-11-26 | End: 2024-12-10

## 2024-11-25 RX ORDER — OXYCODONE AND ACETAMINOPHEN 5; 325 MG/1; MG/1
1 TABLET ORAL EVERY 6 HOURS PRN
Qty: 12 TABLET | Status: SHIPPED | OUTPATIENT
Start: 2024-11-25 | End: 2024-11-28

## 2024-11-25 RX ADMIN — FAMOTIDINE 20 MG: 20 TABLET, FILM COATED ORAL at 08:22

## 2024-11-25 RX ADMIN — DOCUSATE SODIUM 100 MG: 100 CAPSULE, LIQUID FILLED ORAL at 08:21

## 2024-11-25 RX ADMIN — SENNOSIDES AND DOCUSATE SODIUM 2 TABLET: 50; 8.6 TABLET ORAL at 08:21

## 2024-11-25 RX ADMIN — ENOXAPARIN SODIUM 40 MG: 100 INJECTION SUBCUTANEOUS at 08:22

## 2024-11-25 RX ADMIN — HYDROCODONE BITARTRATE AND ACETAMINOPHEN 2 TABLET: 5; 325 TABLET ORAL at 13:07

## 2024-11-25 RX ADMIN — Medication 1 TABLET: at 08:22

## 2024-11-25 RX ADMIN — DIAZEPAM 5 MG: 5 TABLET ORAL at 11:22

## 2024-11-25 RX ADMIN — HYDROCODONE BITARTRATE AND ACETAMINOPHEN 2 TABLET: 5; 325 TABLET ORAL at 08:21

## 2024-11-25 RX ADMIN — POLYETHYLENE GLYCOL 3350 17 G: 17 POWDER, FOR SOLUTION ORAL at 11:25

## 2024-11-25 RX ADMIN — METOPROLOL TARTRATE 25 MG: 50 TABLET, FILM COATED ORAL at 08:22

## 2024-11-25 RX ADMIN — HYDROCODONE BITARTRATE AND ACETAMINOPHEN 2 TABLET: 5; 325 TABLET ORAL at 03:04

## 2024-11-25 RX ADMIN — DIAZEPAM 5 MG: 5 TABLET ORAL at 03:05

## 2024-11-25 RX ADMIN — SODIUM CHLORIDE, PRESERVATIVE FREE 10 ML: 5 INJECTION INTRAVENOUS at 08:22

## 2024-11-25 ASSESSMENT — PAIN SCALES - GENERAL
PAINLEVEL_OUTOF10: 7

## 2024-11-25 ASSESSMENT — PAIN DESCRIPTION - LOCATION
LOCATION: BACK;HIP
LOCATION: BACK;HIP

## 2024-11-25 ASSESSMENT — PAIN DESCRIPTION - DESCRIPTORS
DESCRIPTORS: ACHING
DESCRIPTORS: ACHING

## 2024-11-25 ASSESSMENT — PAIN DESCRIPTION - FREQUENCY: FREQUENCY: CONTINUOUS

## 2024-11-25 ASSESSMENT — PAIN DESCRIPTION - ONSET: ONSET: ON-GOING

## 2024-11-25 ASSESSMENT — PAIN DESCRIPTION - ORIENTATION
ORIENTATION: LOWER;LEFT;RIGHT
ORIENTATION: LOWER;RIGHT;LEFT

## 2024-11-25 ASSESSMENT — PAIN DESCRIPTION - PAIN TYPE: TYPE: ACUTE PAIN

## 2024-11-25 NOTE — DISCHARGE SUMMARY
Discharge Summary    Terra Hills  :  1950  MRN:  [id number]    Admit date:  2024  Discharge date:      Admitting Physician:  Renetta Ceballos MD    Discharge Diagnoses:              Severe lower back pain post lumbar spinal decompression and posterior spinal fusion.  Debility postop.  Hypertension  Obstructive sleep apnea on home CPAP    Patient Active Problem List   Diagnosis    HTN (hypertension)    Impaired fasting blood sugar    Hyperlipidemia    Obstructive sleep apnea    Macular degeneration of left eye    Osteoarthritis    Thrombocytopenia (HCC)    Leukopenia    Status post total hip replacement, left    Postoperative pain       Admission Condition:  serious  Discharged Condition:  good    Hospital Course:   74-year-old lady was admitted into [facility] for severe lower back pain post lumbar spinal decompressive surgery.  Patient pain was inhibiting her mobility.  She was admitted for pain control and further evaluation.  She was medicated accordingly.  PT OT initiated /continued.  Physical medicine and rehab recommended continued diarrhea SNF rehab environment.  Patient was accepted and discharged to SNF for continued rehab in stable condition.    Discharge Medications:         Medication List        START taking these medications      enoxaparin 40 MG/0.4ML  Commonly known as: LOVENOX  Inject 0.4 mLs into the skin daily for 14 days  Start taking on: 2024     FISH OIL PO     Semaglutide (1 MG/DOSE) 4 MG/3ML Sopn sc injection  Commonly known as: OZEMPIC  Inject 0.33 ml (1.2 mg) once weekly     tiZANidine 4 MG tablet  Commonly known as: ZANAFLEX  Take 1 tablet by mouth nightly as needed (muscle spasms)            CHANGE how you take these medications      oxyCODONE-acetaminophen 5-325 MG per tablet  Commonly known as: PERCOCET  Take 1 tablet by mouth every 6 hours as needed for Pain for up to 3 days. Max Daily Amount: 4 tablets  What changed: reasons to take this

## 2024-11-25 NOTE — CARE COORDINATION
11/25/24, 8:20 AM EST    DISCHARGE PLANNING EVALUATION   Precert for [facility] is approved, can accept today.   Discussed with patient, who is in agreement, plans for family to transport.  Confirmed plan with [facility] admissions, THOMAS faxed.     11/25/24, 1:25 PM EST    Patient goals/plan/ treatment preferences discussed by  and .  Patient goals/plan/ treatment preferences reviewed with patient/ family.  Patient/ family verbalize understanding of discharge plan and are in agreement with goal/plan/treatment preferences.  Understanding was demonstrated using the teach back method.  AVS provided by RN at time of discharge, which includes all necessary medical information pertaining to the patients current course of illness, treatment, post-discharge goals of care, and treatment preferences.     Services At/After Discharge: Skilled Nursing Facility (SNF)

## 2024-11-25 NOTE — PROGRESS NOTES
11/20/24 1022   Encounter Summary   Encounter Overview/Reason Spiritual/Emotional Needs   Service Provided For Patient   Referral/Consult From Multi-disciplinary team   Support System Children   Last Encounter  11/20/24   Complexity of Encounter Moderate   Begin Time 1012   End Time  1022   Total Time Calculated 10 min   Spiritual/Emotional needs   Type Spiritual Support   Assessment/Intervention/Outcome   Assessment Coping   Intervention Active listening;Empowerment;End of Life Care;Nurtured Hope;Prayer (assurance of)/[place]   Outcome Comfort;Coping     Spiritual Health History and Assessment/Progress Note  [facility]    (P) Spiritual/Emotional Needs,  ,  ,      Name: Terra Hills MRN: [id number]    Age: 74 y.o.     Sex: female   Language: English   [employer]: Congregation   Postoperative pain     Date: 11/20/2024            Total Time Calculated: (P) 10 min              Spiritual Assessment began in [facility] ORTHOPED[facility] 7K        Referral/Consult From: (P) Multi-disciplinary team   Encounter Overview/Reason: (P) Spiritual/Emotional Needs  Service Provided For: (P) Patient    Kita, Belief, Meaning:   Patient identifies as spiritual  Family/Friends identify as spiritual      Importance and Influence:  Patient has spiritual/personal beliefs that influence decisions regarding their health  Family/Friends have spiritual/personal beliefs that influence decisions regarding the patient's health    Community:  Patient is connected with a spiritual community  Family/Friends are connected with a spiritual community:    Assessment and Plan of Care:   During my encounter with the 74 yr old patient, I gave the patient a copy of the Advance Directive as requested. I assess the pt's spiritual needs. The pt was admitted due to postoperative pain.     Patient Interventions include: Facilitated expression of thoughts and feelings  Family/Friends Interventions include: Facilitated expression of thoughts 
 [facility]  INPATIENT PHYSICAL THERAPY  DAILY NOTE  [facility] ORTHOPEDICS 7K - 7K-27/027-A      Discharge Recommendations: Subacte/Skilled Nursing Facility and due to pt not having 24 hour assist    Equipment Recommendations: No                 Time In: 08  Time Out: 09  Timed Code Treatment Minutes: 41 Minutes  Minutes: 41          Date: 2024  Patient Name: Terra Hills,  Gender:  female        MRN: [id number]  : 1950  (74 y.o.)     Referring Practitioner: Tk Milton PA  Diagnosis: Postoperative pain  Additional Pertinent Hx: per EMR \"The patient is a 74 y.o. female who presents with increasing pain in the lower back with severe muscle spasms in the last 5 days.  Patient had a lumbar spine decompression surgery done on 2024 discharged home on 2024.  Since discharge she is noted increasing pain in the lower back.  Pain radiates down both legs.  No legs weakness.  No bowel dysfunction.  No urinary incontinence.  She was unable to function at home because of the increasing pain that was came to emergency room yesterday evening.  Patient had MRI of the lumbar spine.  Reported postoperative changes with bilateral pedicle screws at L2-L3-L4 and L5 with laminectomy defects at L2-L3 and L4, mild residual canal and moderate bilateral foraminal stenosis at L4-L5 L5-S1 and L1-L2 and moderate bilateral foraminal stenosis with no canal stenosis at L3-L4.  Degenerative changes in the sacroiliac joints bilaterally also noted.  Patient was then admitted for pain relief,  further evaluation / PT OT.  \"     Prior Level of Function:  Lives With: Alone  Type of Home: House  Home Layout: Two level, Performs ADL's on one level, Able to Live on Main level with bedroom/bathroom  Home Access: Stairs to enter without rails  Entrance Stairs - Number of Steps: 1, another into kitchen  Home Equipment: Walker - Rolling   Bathroom Shower/Tub: Walk-in shower  Bathroom Toilet: Standard  Bathroom 
 [facility]  INPATIENT PHYSICAL THERAPY  DAILY NOTE  [facility] ORTHOPEDICS 7K - 7K-27/027-A      Discharge Recommendations: Subacte/Skilled Nursing Facility vs Home with Home Health PT  Equipment Recommendations: No               Time In: 1425  Time Out: 1450  Timed Code Treatment Minutes: 25 Minutes  Minutes: 25          Date: 2024  Patient Name: Terra Hills,  Gender:  female        MRN: [id number]  : 1950  (74 y.o.)     Referring Practitioner: Tk Milton PA  Diagnosis: Postoperative pain  Additional Pertinent Hx: per EMR \"The patient is a 74 y.o. female who presents with increasing pain in the lower back with severe muscle spasms in the last 5 days.  Patient had a lumbar spine decompression surgery done on 2024 discharged home on 2024.  Since discharge she is noted increasing pain in the lower back.  Pain radiates down both legs.  No legs weakness.  No bowel dysfunction.  No urinary incontinence.  She was unable to function at home because of the increasing pain that was came to emergency room yesterday evening.  Patient had MRI of the lumbar spine.  Reported postoperative changes with bilateral pedicle screws at L2-L3-L4 and L5 with laminectomy defects at L2-L3 and L4, mild residual canal and moderate bilateral foraminal stenosis at L4-L5 L5-S1 and L1-L2 and moderate bilateral foraminal stenosis with no canal stenosis at L3-L4.  Degenerative changes in the sacroiliac joints bilaterally also noted.  Patient was then admitted for pain relief,  further evaluation / PT OT.  \"     Prior Level of Function:  Lives With: Alone  Type of Home: House  Home Layout: Two level, Performs ADL's on one level, Able to Live on Main level with bedroom/bathroom  Home Access: Stairs to enter without rails  Entrance Stairs - Number of Steps: 1, another into kitchen  Home Equipment: Walker - Rolling   Bathroom Shower/Tub: Walk-in shower  Bathroom Toilet: Standard  Bathroom Equipment: Shower 
 [facility]  INPATIENT PHYSICAL THERAPY  DAILY NOTE  [facility] ORTHOPEDICS 7K - 7K-27/027-A      Discharge Recommendations: Home with Home Health PT vs SNF stay. Patient is from home alone and does not feel safe returning home alone at this time.   Equipment Recommendations: No               Time In: 0952  Time Out: 1015  Timed Code Treatment Minutes: 23 Minutes  Minutes: 23          Date: 2024  Patient Name: Terra Hills,  Gender:  female        MRN: [id number]  : 1950  (74 y.o.)     Referring Practitioner: Tk Milton PA  Diagnosis: Postoperative pain  Additional Pertinent Hx: per EMR \"The patient is a 74 y.o. female who presents with increasing pain in the lower back with severe muscle spasms in the last 5 days.  Patient had a lumbar spine decompression surgery done on 2024 discharged home on 2024.  Since discharge she is noted increasing pain in the lower back.  Pain radiates down both legs.  No legs weakness.  No bowel dysfunction.  No urinary incontinence.  She was unable to function at home because of the increasing pain that was came to emergency room yesterday evening.  Patient had MRI of the lumbar spine.  Reported postoperative changes with bilateral pedicle screws at L2-L3-L4 and L5 with laminectomy defects at L2-L3 and L4, mild residual canal and moderate bilateral foraminal stenosis at L4-L5 L5-S1 and L1-L2 and moderate bilateral foraminal stenosis with no canal stenosis at L3-L4.  Degenerative changes in the sacroiliac joints bilaterally also noted.  Patient was then admitted for pain relief,  further evaluation / PT OT.  \"     Prior Level of Function:  Lives With: Alone  Type of Home: House  Home Layout: Two level, Performs ADL's on one level, Able to Live on Main level with bedroom/bathroom  Home Access: Stairs to enter without rails  Entrance Stairs - Number of Steps: 1, another into kitchen  Home Equipment: Walker - Rolling   Bathroom Shower/Tub: Walk-in 
[facility]  PHYSICAL THERAPY MISSED TREATMENT NOTE  [facility] ORTHOPEDICS 7K    Date: 2024  Patient Name: Terra Hills        MRN: [id number]   : 1950  (74 y.o.)  Gender: female   Referring Practitioner: Tk Milton PA            REASON FOR MISSED TREATMENT:  Missed Treat.  RN ok'ed session. On first attempt patient refused d/t increased pain. On second attempt RN voiced that pts son was almost here to take pt to SNF. If patient is not discharge to snf today. PT To attempt to see at next available date as time allows and as willing.       
Department of Orthopedic Surgery  Spine Service  Attending Progress Note        Subjective:  POD#10 - pain better controlled. Denies leg pain. C/o surgical site pain after working with PT yesterday.     Vitals  VITALS:  BP (!) 160/75   Pulse 67   Temp 97.6 °F (36.4 °C) (Oral)   Resp 18   Ht 1.575 m (5' 2\")   Wt 88.5 kg (195 lb)   SpO2 99%   BMI 35.67 kg/m²   24HR INTAKE/OUTPUT:    Intake/Output Summary (Last 24 hours) at 11/21/2024 0651  Last data filed at 11/20/2024 2032  Gross per 24 hour   Intake 1080 ml   Output --   Net 1080 ml     URINARY CATHETER OUTPUT (King):     DRAIN/TUBE OUTPUT:         PHYSICAL EXAM:    Orientation:  alert and oriented to person, place and time    Lower Extremity Motor :  quadriceps, extensor hallucis longus, dorsiflexion, plantarflexion 5/5 bilaterally  Lower Extremity Sensory:  Intact L1-S1    Flatus:  positive    LABS:    HgB:    Lab Results   Component Value Date/Time    HGB 10.3 11/19/2024 06:12 AM       ASSESSMENT AND PLAN:    Post operative day 10 status post L2-5 decompression and fusion    1:  Monitor labs  2:  Activity Level:  as tolerated. PT/OT  3:  Pain Control:  good - decrease decadron to 6 mg IV q8h  4:  Discharge Planning:  planning [facility]      
Department of Orthopedic Surgery  Spine Service  Attending Progress Note        Subjective:  POD#9, Doing better today. Lumbar pain improved.    Vitals  VITALS:  BP (!) 153/83   Pulse 75   Temp 97.8 °F (36.6 °C) (Oral)   Resp 16   Ht 1.575 m (5' 2\")   Wt 88.5 kg (195 lb)   SpO2 100%   BMI 35.67 kg/m²   24HR INTAKE/OUTPUT:    Intake/Output Summary (Last 24 hours) at 11/20/2024 0649  Last data filed at 11/19/2024 1319  Gross per 24 hour   Intake 840 ml   Output --   Net 840 ml     URINARY CATHETER OUTPUT (King):     DRAIN/TUBE OUTPUT:         PHYSICAL EXAM:    Orientation:  alert and oriented to person, place and time    Lower Extremity Motor :  quadriceps, extensor hallucis longus, dorsiflexion, plantarflexion 5/5 bilaterally  Lower Extremity Sensory:  Intact L1-S1    Flatus:  positive    ABNORMAL EXAM FINDINGS:  none    LABS:    HgB:    Lab Results   Component Value Date/Time    HGB 10.3 11/19/2024 06:12 AM       ASSESSMENT AND PLAN:    Post operative day 9 status post L2-5 decompression and fusion    1:  Monitor labs  2:  Activity Level:  as tolerated  3:  Pain Control:  good  4:  Discharge Planning:  planning F    Eric Davis PA-C      
INTERNAL MEDICINE Progress Note  11/20/2024 1:42 PM  Subjective:   Admit Date: 11/18/2024  PCP: Desirae Calhoun DO  Interval History:     Patient seen.  Back pain better with analgesics.  Tolerating physical therapy and Occupational Therapy.  Been evaluated for continued PT in SNF setting.    Objective:   Vitals: BP (!) 145/67   Pulse 67   Temp 97.7 °F (36.5 °C) (Oral)   Resp 18   Ht 1.575 m (5' 2\")   Wt 88.5 kg (195 lb)   SpO2 95%   BMI 35.67 kg/m²   General appearance: alert and cooperative with exam  HEENT: Head: atraumatic  Neck: no adenopathy, no carotid bruit, and no JVD  Lungs: clear to auscultation bilaterally  Heart: S1, S2 normal  Abdomen: soft, non-tender; bowel sounds normal; no masses,  no organomegaly  Extremities: extremities normal, atraumatic, no cyanosis or edema  Neurologic: Mental status: Alert, oriented, thought content appropriate      Medications:   Scheduled Meds:   docusate sodium  100 mg Oral Daily    sennosides-docusate sodium  2 tablet Oral Daily    rosuvastatin  20 mg Oral Nightly    multivitamin  1 tablet Oral Daily    sodium chloride flush  5-40 mL IntraVENous 2 times per day    enoxaparin  40 mg SubCUTAneous Daily    famotidine  20 mg Oral BID    metoprolol tartrate  25 mg Oral BID    amLODIPine  5 mg Oral Daily    dexAMETHasone  8 mg IntraVENous Q8H     Continuous Infusions:   sodium chloride         Lab Results:   CBC:   Recent Labs     11/18/24  1248 11/19/24  0612   WBC 5.2 4.1*   HGB 10.7* 10.3*    178     BMP:    Recent Labs     11/19/24  0612      K 4.7      CO2 23   BUN 19   CREATININE 0.6   GLUCOSE 159*       INR:   Recent Labs     11/18/24  1248   INR 0.98       HgBA1c:    Lab Results   Component Value Date/Time    LABA1C 5.1 09/10/2024 09:55 AM     TSH:    Lab Results   Component Value Date/Time    TSH 0.33 08/11/2020 10:45 AM         Assessment and Plan:   Severe lower back pain post lumbar spinal decompression and posterior spinal 
INTERNAL MEDICINE Progress Note  11/21/2024 1:48 PM  Subjective:   Admit Date: 11/18/2024  PCP: Desirae Calhoun, DO  Interval History:     Tolerating physical therapy and Occupational Therapy.  Been evaluated for continued PT in SNF setting.    Objective:   Vitals: /69   Pulse 75   Temp 97.5 °F (36.4 °C) (Oral)   Resp 18   Ht 1.575 m (5' 2\")   Wt 88.5 kg (195 lb)   SpO2 98%   BMI 35.67 kg/m²   General appearance: alert and cooperative with exam  HEENT:  atraumatic  Neck: no adenopathy, no carotid bruit, and no JVD  Lungs: clear to auscultation bilaterally  Heart: S1, S2 normal  Abdomen: soft, non-tender; bowel sounds normal; no masses,  no organomegaly  Extremities: extremities normal, atraumatic, no cyanosis or edema  Neurologic: Alert, oriented, thought content appropriate      Medications:   Scheduled Meds:   dexAMETHasone  6 mg IntraVENous Q8H    docusate sodium  100 mg Oral Daily    sennosides-docusate sodium  2 tablet Oral Daily    rosuvastatin  20 mg Oral Nightly    multivitamin  1 tablet Oral Daily    sodium chloride flush  5-40 mL IntraVENous 2 times per day    enoxaparin  40 mg SubCUTAneous Daily    famotidine  20 mg Oral BID    metoprolol tartrate  25 mg Oral BID    amLODIPine  5 mg Oral Daily     Continuous Infusions:   sodium chloride         Lab Results:   CBC:   Recent Labs     11/19/24  0612   WBC 4.1*   HGB 10.3*        BMP:    Recent Labs     11/19/24  0612      K 4.7      CO2 23   BUN 19   CREATININE 0.6   GLUCOSE 159*       INR:   No results for input(s): \"INR\" in the last 72 hours.      HgBA1c:    Lab Results   Component Value Date/Time    LABA1C 5.1 09/10/2024 09:55 AM     TSH:    Lab Results   Component Value Date/Time    TSH 0.33 08/11/2020 10:45 AM         Assessment and Plan:   Severe lower back pain post lumbar spinal decompression and posterior spinal fusion.  Debility postop.  Hypertension  Obstructive sleep apnea on home CPAP/ NIPPV    continue 
INTERNAL MEDICINE Progress Note  11/22/2024 1:23 PM  Subjective:   Admit Date: 11/18/2024  PCP: Desirae Calhoun, DO  Interval History:     Tolerating physical therapy and Occupational Therapy.  Been evaluated for continued PT in SNF setting.    Objective:   Vitals: /71   Pulse 71   Temp 98.8 °F (37.1 °C) (Oral)   Resp 16   Ht 1.575 m (5' 2\")   Wt 88.5 kg (195 lb)   SpO2 98%   BMI 35.67 kg/m²   General appearance: alert and cooperative with exam  HEENT:  atraumatic  Neck: no adenopathy, no carotid bruit, and no JVD  Lungs: clear to auscultation bilaterally  Heart: S1, S2 normal  Abdomen: soft, non-tender; bowel sounds normal; no masses,  no organomegaly  Extremities: extremities normal, atraumatic, no cyanosis or edema  Neurologic: Alert, oriented, thought content appropriate      Medications:   Scheduled Meds:   docusate sodium  100 mg Oral Daily    sennosides-docusate sodium  2 tablet Oral Daily    rosuvastatin  20 mg Oral Nightly    multivitamin  1 tablet Oral Daily    sodium chloride flush  5-40 mL IntraVENous 2 times per day    enoxaparin  40 mg SubCUTAneous Daily    famotidine  20 mg Oral BID    metoprolol tartrate  25 mg Oral BID    amLODIPine  5 mg Oral Daily     Continuous Infusions:   sodium chloride         Lab Results:   CBC:   No results for input(s): \"WBC\", \"HGB\", \"PLT\" in the last 72 hours.    BMP:    No results for input(s): \"NA\", \"K\", \"CL\", \"CO2\", \"BUN\", \"CREATININE\", \"GLUCOSE\" in the last 72 hours.      INR:   No results for input(s): \"INR\" in the last 72 hours.      HgBA1c:    Lab Results   Component Value Date/Time    LABA1C 5.1 09/10/2024 09:55 AM     TSH:    Lab Results   Component Value Date/Time    TSH 0.33 08/11/2020 10:45 AM         Assessment and Plan:   Severe lower back pain post lumbar spinal decompression and posterior spinal fusion.  Debility postop.  Hypertension  Obstructive sleep apnea on home CPAP/ NIPPV    continue analgesics,   Muscle relaxers  DVT 
INTERNAL MEDICINE Progress Note  11/23/2024 1:00 PM  Subjective:   Admit Date: 11/18/2024  PCP: Desirae Calhoun, DO  Interval History:     Tolerating physical therapy and Occupational Therapy.  Been evaluated for continued PT in SNF setting.  Awaiting Ins precert for SNF    Objective:   Vitals: /60   Pulse 77   Temp 98.1 °F (36.7 °C) (Oral)   Resp 16   Ht 1.575 m (5' 2\")   Wt 88.5 kg (195 lb)   SpO2 95%   BMI 35.67 kg/m²   General appearance: alert and cooperative with exam  HEENT:  atraumatic  Neck: no adenopathy, no carotid bruit, and no JVD  Lungs: clear to auscultation bilaterally  Heart: S1, S2 normal  Abdomen: soft, non-tender; bowel sounds normal; no masses,  no organomegaly  Extremities: extremities normal, atraumatic, no cyanosis or edema  Neurologic: Alert, oriented, thought content appropriate      Medications:   Scheduled Meds:   [Held by provider] metoprolol tartrate  50 mg Oral BID    [Held by provider] hydroCHLOROthiazide  25 mg Oral QAM    docusate sodium  100 mg Oral Daily    sennosides-docusate sodium  2 tablet Oral Daily    rosuvastatin  20 mg Oral Nightly    multivitamin  1 tablet Oral Daily    sodium chloride flush  5-40 mL IntraVENous 2 times per day    enoxaparin  40 mg SubCUTAneous Daily    famotidine  20 mg Oral BID    [Held by provider] amLODIPine  5 mg Oral Daily     Continuous Infusions:   sodium chloride         Lab Results:   CBC:   No results for input(s): \"WBC\", \"HGB\", \"PLT\" in the last 72 hours.    BMP:    No results for input(s): \"NA\", \"K\", \"CL\", \"CO2\", \"BUN\", \"CREATININE\", \"GLUCOSE\" in the last 72 hours.      INR:   No results for input(s): \"INR\" in the last 72 hours.      HgBA1c:    Lab Results   Component Value Date/Time    LABA1C 5.1 09/10/2024 09:55 AM     TSH:    Lab Results   Component Value Date/Time    TSH 0.33 08/11/2020 10:45 AM         Assessment and Plan:   Severe lower back pain post lumbar spinal decompression and posterior spinal fusion.  Debility 
INTERNAL MEDICINE Progress Note  11/24/2024 12:58 PM  Subjective:   Admit Date: 11/18/2024  PCP: Desirae Calhoun,   Interval History:     Feels better today  Post op pain lower back, on analgesics  Tolerating PT/OT  Been evaluated for continued PT in SNF setting.  Awaiting Ins precert for SNF    Objective:   Vitals: BP (!) 127/59   Pulse 81   Temp 98.1 °F (36.7 °C) (Oral)   Resp 18   Ht 1.575 m (5' 2\")   Wt 88.5 kg (195 lb)   SpO2 100%   BMI 35.67 kg/m²   General appearance: alert and cooperative with exam  HEENT:  atraumatic  Neck: no adenopathy, no carotid bruit, and no JVD  Lungs: clear to auscultation bilaterally  Heart: S1, S2 normal  Abdomen: soft, non-tender; bowel sounds normal; no masses,  no organomegaly  Extremities: extremities normal, atraumatic, no cyanosis or edema  Neurologic: Alert, oriented, thought content appropriate      Medications:   Scheduled Meds:   metoprolol tartrate  25 mg Oral BID    docusate sodium  100 mg Oral Daily    sennosides-docusate sodium  2 tablet Oral Daily    rosuvastatin  20 mg Oral Nightly    multivitamin  1 tablet Oral Daily    sodium chloride flush  5-40 mL IntraVENous 2 times per day    enoxaparin  40 mg SubCUTAneous Daily    famotidine  20 mg Oral BID     Continuous Infusions:   sodium chloride         Lab Results:   CBC:   Recent Labs     11/23/24  1316   WBC 6.0   HGB 11.4*          BMP:    Recent Labs     11/23/24  1316      K 4.2      CO2 25   BUN 27*   CREATININE 0.6   GLUCOSE 107         INR:   No results for input(s): \"INR\" in the last 72 hours.      HgBA1c:    Lab Results   Component Value Date/Time    LABA1C 5.1 09/10/2024 09:55 AM     TSH:    Lab Results   Component Value Date/Time    TSH 0.33 08/11/2020 10:45 AM         Assessment and Plan:   Severe lower back pain post lumbar spinal decompression and posterior spinal fusion.  Debility postop.  Hypertension  Obstructive sleep apnea on home CPAP/ NIPPV    continue analgesics, 
INTERNAL MEDICINE Progress Note  11/25/2024 11:59 AM  Subjective:   Admit Date: 11/18/2024  PCP: Desirae Calhoun,   Interval History:     Feels better today  Bm +  Back pain, on analgesics    Objective:   Vitals: /66   Pulse 99   Temp 98.6 °F (37 °C) (Oral)   Resp 18   Ht 1.575 m (5' 2\")   Wt 88.5 kg (195 lb)   SpO2 99%   BMI 35.67 kg/m²   General appearance: alert and cooperative with exam  HEENT:  atraumatic  Neck: no adenopathy, no carotid bruit, and no JVD  Lungs: clear to auscultation bilaterally  Heart: S1, S2 normal  Abdomen: soft, non-tender; bowel sounds normal; no masses,  no organomegaly  Extremities: extremities normal, atraumatic, no cyanosis or edema  Neurologic: Alert, oriented, thought content appropriate      Medications:   Scheduled Meds:   metoprolol tartrate  25 mg Oral BID    docusate sodium  100 mg Oral Daily    sennosides-docusate sodium  2 tablet Oral Daily    rosuvastatin  20 mg Oral Nightly    multivitamin  1 tablet Oral Daily    sodium chloride flush  5-40 mL IntraVENous 2 times per day    enoxaparin  40 mg SubCUTAneous Daily    famotidine  20 mg Oral BID     Continuous Infusions:   sodium chloride         Lab Results:   CBC:   Recent Labs     11/23/24  1316   WBC 6.0   HGB 11.4*          BMP:    Recent Labs     11/23/24  1316      K 4.2      CO2 25   BUN 27*   CREATININE 0.6   GLUCOSE 107         INR:   No results for input(s): \"INR\" in the last 72 hours.      HgBA1c:    Lab Results   Component Value Date/Time    LABA1C 5.1 09/10/2024 09:55 AM     TSH:    Lab Results   Component Value Date/Time    TSH 0.33 08/11/2020 10:45 AM         Assessment and Plan:   Severe lower back pain post lumbar spinal decompression and posterior spinal fusion.  Debility postop.  Hypertension  Obstructive sleep apnea on home CPAP/ NIPPV    continue analgesics,   Muscle relaxers prn  DVT prophylaxis  Continue PT OT.  Dc to SNF today.      Renetta Ceballos MD, MD    
[facility] ORTHOPEDICS 7K  Occupational Therapy  Daily Note    Discharge Recommendations: Subacute/skilled nursing facility  Equipment Recommendations: Yes        Time In: 1140  Time Out: 1219  Timed Code Treatment Minutes: 39 Minutes  Minutes: 39          Date: 2024  Patient Name: Terra Hills,   Gender: female      Room: -27/027-A  MRN: [id number]  : 1950  (74 y.o.)  Referring Practitioner: JAVI Nevarez  Diagnosis: Postoperative pain  Additional Pertinent Hx: per chart review;   A 74-year-old female presents with intractable lower back pain radiating to bilateral LE with difficulty ambulating (no leg weakness).  She is status post L2-L5 instrumentation and L2-L4 laminectomy postop day 7 at S by Dr. Lawson.  She has been taking Percocet. She was seen by spine surgery yesterday in office and was prescribed Medrol Dosepak.  Pain is not getting better.  She was seen earlier in ED today with lumbar MRI done showing no signs of fluid collection or cauda equina syndrome, and she was subsequently discharged home.  Due to persistent pain and difficulty ambulating, she called her spine surgeon again and was advised to come back for pain control and possible admission.  She denies urinary retention.  No stool incontinence.  No saddle area numbness/tingling.    Restrictions/Precautions:  Restrictions/Precautions: General Precautions, Fall Risk, Surgical Protocols  Required Braces or Orthoses  Spinal: Lumbar Corset  Position Activity Restriction  Spinal Precautions: No Bending, No Lifting, No Twisting     Social/Functional History:  Lives With: Alone  Type of Home: House  Home Layout: Two level, Performs ADL's on one level, Able to Live on Main level with bedroom/bathroom  Home Access: Stairs to enter without rails  Entrance Stairs - Number of Steps: 1, another into kitchen  Home Equipment: Walker - Rolling   Bathroom Shower/Tub: Walk-in shower  Bathroom Toilet: 
[facility]  Occupational Therapy  Daily Note    Discharge Recommendations: Subacute/skilled nursing facility  Equipment Recommendations: Yes        Time In: 1115  Time Out: 1145  Timed Code Treatment Minutes: 30 Minutes  Minutes: 30          Date: 2024  Patient Name: Terra Hills,   Gender: female      Room: -27/027-A  MRN: [id number]  : 1950  (74 y.o.)  Referring Practitioner: JAVI Nevarez  Diagnosis: Postoperative pain  Additional Pertinent Hx: per chart review;   A 74-year-old female presents with intractable lower back pain radiating to bilateral LE with difficulty ambulating (no leg weakness).  She is status post L2-L5 instrumentation and L2-L4 laminectomy postop day 7 at S by Dr. Lawson.  She has been taking Percocet. She was seen by spine surgery yesterday in office and was prescribed Medrol Dosepak.  Pain is not getting better.  She was seen earlier in ED today with lumbar MRI done showing no signs of fluid collection or cauda equina syndrome, and she was subsequently discharged home.  Due to persistent pain and difficulty ambulating, she called her spine surgeon again and was advised to come back for pain control and possible admission.  She denies urinary retention.  No stool incontinence.  No saddle area numbness/tingling.    Restrictions/Precautions:  Restrictions/Precautions: General Precautions, Fall Risk, Surgical Protocols  Required Braces or Orthoses  Spinal: Lumbar Corset  Position Activity Restriction  Spinal Precautions: No Bending, No Lifting, No Twisting     Social/Functional History:  Lives With: Alone  Type of Home: House  Home Layout: Two level, Performs ADL's on one level, Able to Live on Main level with bedroom/bathroom  Home Access: Stairs to enter without rails  Entrance Stairs - Number of Steps: 1, another into kitchen  Home Equipment: Walker - Rolling   Bathroom Shower/Tub: Walk-in shower  Bathroom Toilet: 
Patient discharged to [facility]. Patient left with all belongings, AVS, blue folder. Patient son transporting patient to facility. All questions answered at this time. Patient will follow up with PCP and [facility] as previously scheduled.        
Patient received last rites/anointing by a . If you are in need of  support, please call [phone]. If you are in need of a  after 6:30pm, please call the house supervisor for the on-call .      Keri Guerrier  Spiritual Health Coordinator  [phone]   
Pt admitted to  7k27 in a wheelchair.     Complaints: back pain.      NO IV  infusing into the forearm right, condition patent and no rednessIV site free of s/s of infection or infiltration.     Vital signs obtained. Assessment and data collection initiated.     Swallow Screen completed and documented for all patients ages 45 and older. Passed.    Policies and procedures for 7K explained. All questions answered with no further questions at this time. Fall prevention and safety brochure discussed with patient.  Bed alarm on. Call light in reach. Oriented to room.      
Report called to [facility].  Informed them that Dr Ceballos would be calling about her prescription.   
Spiritual Health History and Assessment/Progress Note  [facility]    (P) Spiritual/Emotional Needs,  ,  ,      Name: Terra Hills MRN: [id number]    Age: 74 y.o.     Sex: female   Language: English   Restorationism: Anabaptist   Postoperative pain     Date: 11/22/2024            Total Time Calculated: (P) 10 min              Spiritual Assessment continued in [facility]        Referral/Consult From: (P) Rounding   Encounter Overview/Reason: (P) Spiritual/Emotional Needs  Service Provided For: (P) Patient    Kita, Belief, Meaning:   Patient identifies as spiritual  Family/Friends identify as spiritual      Importance and Influence:  Patient has spiritual/personal beliefs that influence decisions regarding their health  Family/Friends have spiritual/personal beliefs that influence decisions regarding the patient's health    Community:  Patient is connected with a spiritual community  Family/Friends are connected with a spiritual community:    Assessment and Plan of Care:   In my encounter with the 74 yr old patient, while rounding the unit 7K,  I provided spiritual care to patient through conversation, I also came to assess the patient's spiritual needs present. The pt was admitted due to postoperative pain.     Patient Interventions include: Facilitated expression of thoughts and feelings  Family/Friends Interventions include: Facilitated expression of thoughts and feelings    Patient Plan of Care: Spiritual Care available upon further referral  Family/Friends Plan of Care: Spiritual Care available upon further referral    Electronically signed by FRANCHESCA Gracia on 11/22/2024 at 12:09 PM   
[facility]  Occupational Therapy  Daily Note    Discharge Recommendations: Continue to assess pending progress and Subacute/skilled nursing facility  Equipment Recommendations: Yes         Time In: 0837  Time Out: 0853  Timed Code Treatment Minutes: 16 Minutes  Minutes: 16          Date: 2024  Patient Name: Terra Hills,   Gender: female      Room: [address] [place] [address]  MRN: [id number]  : 1950  (74 y.o.)  Referring Practitioner: JAVI Nevarez  Diagnosis: Postoperative pain  Additional Pertinent Hx: per chart review;   A 74-year-old female presents with intractable lower back pain radiating to bilateral LE with difficulty ambulating (no leg weakness).  She is status post L2-L5 instrumentation and L2-L4 laminectomy postop day 7 at S by Dr. Lawson.  She has been taking Percocet. She was seen by spine surgery yesterday in office and was prescribed Medrol Dosepak.  Pain is not getting better.  She was seen earlier in ED today with lumbar MRI done showing no signs of fluid collection or cauda equina syndrome, and she was subsequently discharged home.  Due to persistent pain and difficulty ambulating, she called her spine surgeon again and was advised to come back for pain control and possible admission.  She denies urinary retention.  No stool incontinence.  No saddle area numbness/tingling.    Restrictions/Precautions:  Restrictions/Precautions: General Precautions, Fall Risk, Surgical Protocols  Required Braces or Orthoses  Spinal: Lumbar Corset  Position Activity Restriction  Spinal Precautions: No Bending, No Lifting, No Twisting      Social/Functional History:  Lives With: Alone  Type of Home: House  Home Layout: Two level, Performs ADL's on one level, Able to Live on Main level with bedroom/bathroom  Home Access: Stairs to enter without rails  Entrance Stairs - Number of Steps: 1, another into kitchen  Home Equipment: Walker - Rolling   Bathroom Shower/Tub: Walk-in 
[facility]  INPATIENT REHABILITATION  ADMISSIONS COORDINATOR CONSULT    Referral Type: internal    Patient Name: Terra Hills      MRN: [id number]    : 1950  (74 y.o.)  Gender: female   Race:White (non-)     Payor Source: Payor: [employer] MEDICARE / Plan: [employer] MEDICARE COMPLETE / Product Type: *No Product type* /   Secondary Payor Source:      Isolation Status: No active isolations    Lives With: Alone  Type of Home: House  Home Layout: Two level, Performs ADL's on one level, Able to Live on Main level with bedroom/bathroom  Home Access: Stairs to enter without rails  Entrance Stairs - Number of Steps: 1, another into kitchen        Additional Comments: Prior to surgery pt was fully IND with functional tasks, no use of an AD. Pt recently has had to have help from family members who had to take off work to help her.    What is treatment plan?  Disciplines Required upon Admission to Inpatient Rehabilitation: Physical Therapy and Occupational Therapy  Post operative: Yes  Fall: No  Dialysis: No  Diet: ADULT DIET; Regular  Discussed patient with  and PM&R provider: Await medical work up completion, await therapy evals and PMR consult for recommendations    
precautions.    Pain: 3/10: in low back at rest, states it increases with movement. Minimal facial grimacing during eval    Vitals: Vitals not assessed per clinical judgement, see nursing flowsheet    Social/Functional History:  Lives With: Alone  Type of Home: House  Home Layout: Two level, Performs ADL's on one level, Able to Live on Main level with bedroom/bathroom  Home Access: Stairs to enter without rails  Entrance Stairs - Number of Steps: 1, another into kitchen  Home Equipment: Walker - Rolling   Bathroom Shower/Tub: Walk-in shower  Bathroom Toilet: Standard  Bathroom Equipment: Shower chair, Safety frame  Bathroom Accessibility: Walker accessible, Wheelchair accessible    Receives Help From: Family  ADL Assistance: Independent  Homemaking Assistance: Independent  Ambulation Assistance: Independent  Transfer Assistance: Independent    Active : Yes  Occupation: Retired  Additional Comments: Prior to surgery pt was fully IND with functional tasks, no use of an AD. Pt recently has had to have help from family members who had to take off work to help her.    VISION:Corrected    HEARING:  WFL    COGNITION: WFL    RANGE OF MOTION:  Bilateral Upper Extremity:  WFL    STRENGTH:  Bilateral Upper Extremity:  WFL    Hand Dominance: Right    SENSATION:   Reports slight tingling in feet    ADL:   Grooming: Contact Guard Assistance.  Standing at sink with good placement of 2 w/w to complete oral care with patient completing quickly due to low back pain and LE fatigue. Patient had to sit in w/c positioned behind her after task prior to walking back to recliner  Footwear Management: Stand By Assistance.  To doff/don slipper sock within spinal precautions with cross over method .  -MOD A to don/doff lumbar corset.     IADL:   Not Tested    BALANCE:  Sitting Balance:  Supervision.    Standing Balance: Contact Guard Assistance. With use of 2 w/w for support.     BED MOBILITY:  Not Tested    TRANSFERS:  Sit to Stand:  
Assistance.    Stand to Sit: Stand By Assistance.      FUNCTIONAL MOBILITY:  Assistive Device: Rolling Walker  Assist Level:  Contact Guard Assistance.   Distance: To and from bathroom  Limited due to pain     ADDITIONAL ACTIVITIES:  Precautions reviewed Pt. Able to recall 3/3.  Pt. Educated on home safety and incorporating into daily ADL routine.        Modified Malik Scale:  Not Applicable    ASSESSMENT:     Activity Tolerance:  Patient tolerance of  treatment: Fair treatment tolerance and Limited by pain      Plan: Times Per Week: 5x  Times Per Day: Once a day  Current Treatment Recommendations: Balance training, Functional mobility training, Endurance training, Neuromuscular re-education, Positioning, Equipment evaluation, education, & procurement, Patient/Caregiver education & training, Safety education & training, Self-Care / ADL, Pain management, Coordination training    Education:  Learners: Patient  ADL's, Precautions, Equipment Education, Fall Prevention, and Assistive Device Safety    Goals  Short Term Goals  Time Frame for Short Term Goals: by discharge  Short Term Goal 1: patient will tolerate 5 min functional standing with SBA to increase ease with toileting and grooming.  Short Term Goal 2: patient will functionally ambulate to/from BR progressing to house hold distances with SBA.  Short Term Goal 3: patient will complete ADL routine with SBA and use of AE PRN.    Following session, patient left in safe position with all fall risk precautions in place.       
Decreased balance  Assessment: Terra Hills is a 74 y.o. female who presents with the deficits stated previously. Pt requires 1 person assist for functional tasks with use of walker for support. Pt cont to require skilled PT services to increase IND with functional tasks and progress towards PLOF to return to home environment safely.   Therapy Prognosis: Good    Requires PT Follow-Up: Yes    Patient Education:      .    Patient Education  Education Given To: Patient  Education Provided: Role of Therapy, Plan of Care, Precautions, Equipment, Transfer Training  Education Method: Verbal  Education Outcome: Demonstrated understanding, Verbalized understanding, Continued education needed       Plan:  Current Treatment Recommendations: Strengthening, Balance training, Gait training, Functional mobility training, Neuromuscular re-education, Stair training, Transfer training, Endurance training, Equipment evaluation, education, & procurement, Patient/Caregiver education & training, Safety education & training, Therapeutic activities, Home exercise program  General Plan:  (5x O)    Goals:  Patient Goals : return home  Short Term Goals  Time Frame for Short Term Goals: by discharge  Short Term Goal 1: Pt will demo sit to/from stand transfers with S to progress with mobility.  Short Term Goal 2: Pt will demo bed mobility tasks with good demo of log roll technique with S to progress with mobility.  Short Term Goal 3: Pt will amb for 35 feet with RW with S to progress with overall mobility.  Short Term Goal 4: Will assess functional outcome assessment  Long Term Goals  Time Frame for Long Term Goals : NA due to short ELOS    Following session, patient left in safe position with all fall risk precautions in place. Pt in bedside chair following session, all needs and call light in reach, alarm on.

## 2024-11-25 NOTE — PLAN OF CARE
Problem: Discharge Planning  Goal: Discharge to home or other facility with appropriate resources  11/21/2024 0023 by Ayush Asencio RN  Outcome: Progressing    Problem: Pain  Goal: Verbalizes/displays adequate comfort level or baseline comfort level  Outcome: Progressing     Problem: ABCDS Injury Assessment  Goal: Absence of physical injury  Outcome: Progressing     Problem: Safety - Adult  Goal: Free from fall injury  Outcome: Progressing     Problem: Neurosensory - Adult  Goal: Achieves stable or improved neurological status  Outcome: Progressing     Problem: Skin/Tissue Integrity - Adult  Goal: Skin integrity remains intact  Outcome: Progressing  Goal: Incisions, wounds, or drain sites healing without S/S of infection  Outcome: Progressing     Problem: Musculoskeletal - Adult  Goal: Return mobility to safest level of function  Outcome: Progressing  Goal: Maintain proper alignment of affected body part  Outcome: Progressing     
  Problem: Discharge Planning  Goal: Discharge to home or other facility with appropriate resources  11/21/2024 2316 by Lida Dodge RN  Outcome: Progressing  Flowsheets (Taken 11/21/2024 2316)  Discharge to home or other facility with appropriate resources:   Identify barriers to discharge with patient and caregiver   Arrange for needed discharge resources and transportation as appropriate   Identify discharge learning needs (meds, wound care, etc)  11/21/2024 1516 by Carolin Francois RN  Outcome: Progressing  Flowsheets (Taken 11/20/2024 0022 by Lida Dodge RN)  Discharge to home or other facility with appropriate resources:   Identify barriers to discharge with patient and caregiver   Arrange for needed discharge resources and transportation as appropriate   Identify discharge learning needs (meds, wound care, etc)  Note: She is from home alone and plans ongoing to the [facility] at discharge.       Problem: Pain  Goal: Verbalizes/displays adequate comfort level or baseline comfort level  11/21/2024 2316 by Lida Dodge RN  Outcome: Progressing  Flowsheets (Taken 11/21/2024 2316)  Verbalizes/displays adequate comfort level or baseline comfort level:   Encourage patient to monitor pain and request assistance   Assess pain using appropriate pain scale   Administer analgesics based on type and severity of pain and evaluate response   Implement non-pharmacological measures as appropriate and evaluate response  11/21/2024 1516 by Carolin Francois RN  Outcome: Progressing  Flowsheets (Taken 11/20/2024 0022 by Lida Dodge RN)  Verbalizes/displays adequate comfort level or baseline comfort level:   Encourage patient to monitor pain and request assistance   Assess pain using appropriate pain scale   Administer analgesics based on type and severity of pain and evaluate response   Implement non-pharmacological measures as appropriate and evaluate response  Note: Ongoing assessment & interventions provided 
  Problem: Discharge Planning  Goal: Discharge to home or other facility with appropriate resources  Outcome: Progressing  Flowsheets (Taken 11/20/2024 0022 by Lida Dodge, KHANH)  Discharge to home or other facility with appropriate resources:   Identify barriers to discharge with patient and caregiver   Arrange for needed discharge resources and transportation as appropriate   Identify discharge learning needs (meds, wound care, etc)  Note: She is from home alone and plans ongoing to the [facility] at discharge.       Problem: Pain  Goal: Verbalizes/displays adequate comfort level or baseline comfort level  Outcome: Progressing  Flowsheets (Taken 11/20/2024 0022 by Lida Dodge, KHANH)  Verbalizes/displays adequate comfort level or baseline comfort level:   Encourage patient to monitor pain and request assistance   Assess pain using appropriate pain scale   Administer analgesics based on type and severity of pain and evaluate response   Implement non-pharmacological measures as appropriate and evaluate response  Note: Ongoing assessment & interventions provided throughout shift.  Reminded patient to report any pain, pressure, or shortness of breath to the nurse.  Pain medications provided per physician's orders.       Problem: ABCDS Injury Assessment  Goal: Absence of physical injury  Outcome: Progressing  Flowsheets (Taken 11/20/2024 0022 by Lida Dodge, RN)  Absence of Physical Injury: Implement safety measures based on patient assessment  Note: Bed locked & in low position, call light in reach, side-rails up x2, bed/chair alarm utilized, non-slip socks on when ambulating, reminded patient to use call light to call for assistance.       Problem: Safety - Adult  Goal: Free from fall injury  Outcome: Progressing  Flowsheets (Taken 11/20/2024 0022 by Lida Dodge RN)  Free From Fall Injury: Instruct family/caregiver on patient safety     Problem: Neurosensory - Adult  Goal: Achieves stable or improved 
  Problem: Discharge Planning  Goal: Discharge to home or other facility with appropriate resources  Outcome: Progressing  Flowsheets (Taken 11/20/2024 0022)  Discharge to home or other facility with appropriate resources:   Identify barriers to discharge with patient and caregiver   Arrange for needed discharge resources and transportation as appropriate   Identify discharge learning needs (meds, wound care, etc)     Problem: Pain  Goal: Verbalizes/displays adequate comfort level or baseline comfort level  Outcome: Progressing  Flowsheets (Taken 11/20/2024 0022)  Verbalizes/displays adequate comfort level or baseline comfort level:   Encourage patient to monitor pain and request assistance   Assess pain using appropriate pain scale   Administer analgesics based on type and severity of pain and evaluate response   Implement non-pharmacological measures as appropriate and evaluate response     Problem: ABCDS Injury Assessment  Goal: Absence of physical injury  Outcome: Progressing  Flowsheets (Taken 11/20/2024 0022)  Absence of Physical Injury: Implement safety measures based on patient assessment     Problem: Safety - Adult  Goal: Free from fall injury  Outcome: Progressing  Flowsheets (Taken 11/20/2024 0022)  Free From Fall Injury: Instruct family/caregiver on patient safety     Problem: Neurosensory - Adult  Goal: Achieves stable or improved neurological status  Outcome: Progressing  Flowsheets (Taken 11/20/2024 0022)  Achieves stable or improved neurological status: Assess for and report changes in neurological status     Problem: Skin/Tissue Integrity - Adult  Goal: Skin integrity remains intact  Outcome: Progressing  Flowsheets (Taken 11/20/2024 0022)  Skin Integrity Remains Intact: Monitor for areas of redness and/or skin breakdown  Goal: Incisions, wounds, or drain sites healing without S/S of infection  Outcome: Progressing  Flowsheets (Taken 11/20/2024 0022)  Incisions, Wounds, or Drain Sites Healing 
  Problem: Discharge Planning  Goal: Discharge to home or other facility with appropriate resources  Outcome: Progressing  Flowsheets (Taken 11/22/2024 2147)  Discharge to home or other facility with appropriate resources:   Identify barriers to discharge with patient and caregiver   Arrange for needed discharge resources and transportation as appropriate   Identify discharge learning needs (meds, wound care, etc)     Problem: Pain  Goal: Verbalizes/displays adequate comfort level or baseline comfort level  Outcome: Progressing  Flowsheets (Taken 11/22/2024 2147)  Verbalizes/displays adequate comfort level or baseline comfort level:   Encourage patient to monitor pain and request assistance   Assess pain using appropriate pain scale   Administer analgesics based on type and severity of pain and evaluate response   Implement non-pharmacological measures as appropriate and evaluate response     Problem: ABCDS Injury Assessment  Goal: Absence of physical injury  Outcome: Progressing  Flowsheets (Taken 11/22/2024 2147)  Absence of Physical Injury: Implement safety measures based on patient assessment     Problem: Safety - Adult  Goal: Free from fall injury  Outcome: Progressing  Flowsheets (Taken 11/22/2024 2147)  Free From Fall Injury: Instruct family/caregiver on patient safety     Problem: Neurosensory - Adult  Goal: Achieves stable or improved neurological status  Outcome: Progressing  Flowsheets (Taken 11/22/2024 2147)  Achieves stable or improved neurological status: Assess for and report changes in neurological status     Problem: Skin/Tissue Integrity - Adult  Goal: Skin integrity remains intact  Outcome: Progressing  Flowsheets (Taken 11/22/2024 2147)  Skin Integrity Remains Intact: Monitor for areas of redness and/or skin breakdown  Goal: Incisions, wounds, or drain sites healing without S/S of infection  Outcome: Progressing  Flowsheets (Taken 11/22/2024 2147)  Incisions, Wounds, or Drain Sites Healing 
  Problem: Discharge Planning  Goal: Discharge to home or other facility with appropriate resources  Outcome: Progressing  Flowsheets (Taken 11/23/2024 1350)  Discharge to home or other facility with appropriate resources:   Arrange for needed discharge resources and transportation as appropriate   Identify barriers to discharge with patient and caregiver   Identify discharge learning needs (meds, wound care, etc)     Problem: Pain  Goal: Verbalizes/displays adequate comfort level or baseline comfort level  Outcome: Progressing  Flowsheets (Taken 11/23/2024 1350)  Verbalizes/displays adequate comfort level or baseline comfort level:   Encourage patient to monitor pain and request assistance   Implement non-pharmacological measures as appropriate and evaluate response   Assess pain using appropriate pain scale     Problem: ABCDS Injury Assessment  Goal: Absence of physical injury  Outcome: Progressing     Problem: Safety - Adult  Goal: Free from fall injury  Outcome: Progressing     Problem: Neurosensory - Adult  Goal: Achieves stable or improved neurological status  Outcome: Progressing     Problem: Skin/Tissue Integrity - Adult  Goal: Skin integrity remains intact  Outcome: Progressing  Flowsheets (Taken 11/23/2024 1350)  Skin Integrity Remains Intact: Monitor for areas of redness and/or skin breakdown     Problem: Musculoskeletal - Adult  Goal: Return mobility to safest level of function  Outcome: Progressing  Flowsheets (Taken 11/23/2024 1350)  Return Mobility to Safest Level of Function: Assess patient stability and activity tolerance for standing, transferring and ambulating with or without assistive devices     Care plan reviewed with patient.  Patient verbalize understanding of the plan of care and contribute to goal setting.     
  Problem: Discharge Planning  Goal: Discharge to home or other facility with appropriate resources  Outcome: Progressing  Flowsheets (Taken 11/24/2024 1501)  Discharge to home or other facility with appropriate resources:   Identify barriers to discharge with patient and caregiver   Arrange for needed discharge resources and transportation as appropriate     Problem: Pain  Goal: Verbalizes/displays adequate comfort level or baseline comfort level  Outcome: Progressing  Flowsheets (Taken 11/24/2024 1501)  Verbalizes/displays adequate comfort level or baseline comfort level:   Encourage patient to monitor pain and request assistance   Assess pain using appropriate pain scale   Implement non-pharmacological measures as appropriate and evaluate response     Problem: ABCDS Injury Assessment  Goal: Absence of physical injury  Outcome: Progressing  Flowsheets (Taken 11/24/2024 1501)  Absence of Physical Injury: Implement safety measures based on patient assessment     Problem: Safety - Adult  Goal: Free from fall injury  Outcome: Progressing  Flowsheets (Taken 11/24/2024 1501)  Free From Fall Injury: Instruct family/caregiver on patient safety     Problem: Neurosensory - Adult  Goal: Achieves stable or improved neurological status  Outcome: Completed     Problem: Skin/Tissue Integrity - Adult  Goal: Skin integrity remains intact  Outcome: Progressing  Flowsheets (Taken 11/24/2024 1501)  Skin Integrity Remains Intact: Monitor for areas of redness and/or skin breakdown     Problem: Musculoskeletal - Adult  Goal: Return mobility to safest level of function  Outcome: Progressing  Flowsheets (Taken 11/24/2024 1501)  Return Mobility to Safest Level of Function: Assess patient stability and activity tolerance for standing, transferring and ambulating with or without assistive devices     Care plan reviewed with patient.  Patient verbalize understanding of the plan of care and contribute to goal setting.      
  Problem: Pain  Goal: Verbalizes/displays adequate comfort level or baseline comfort level  11/23/2024 2249 by Alison Martinez RN  Outcome: Progressing  11/23/2024 1350 by Gisselle Heredia RN  Outcome: Progressing  Flowsheets (Taken 11/23/2024 1350)  Verbalizes/displays adequate comfort level or baseline comfort level:   Encourage patient to monitor pain and request assistance   Implement non-pharmacological measures as appropriate and evaluate response   Assess pain using appropriate pain scale     Problem: Safety - Adult  Goal: Free from fall injury  11/23/2024 2249 by Alison Martinez, RN  Outcome: Progressing  11/23/2024 1350 by Gisselle Heredia RN  Outcome: Progressing     Problem: Skin/Tissue Integrity - Adult  Goal: Incisions, wounds, or drain sites healing without S/S of infection  11/23/2024 2249 by Alison Martinez, RN  Outcome: Progressing  11/23/2024 1350 by Gisselle Heredia RN  Outcome: Progressing   Care plan reviewed with patient. Patient verbalize understanding of the plan of care and contribute to goal setting.    
Planning snf   
Progressing  Flowsheets (Taken 11/24/2024 1501)  Absence of Physical Injury: Implement safety measures based on patient assessment     Problem: Safety - Adult  Goal: Free from fall injury  11/25/2024 0109 by Denisha Rubio RN  Outcome: Progressing  Flowsheets (Taken 11/25/2024 0109)  Free From Fall Injury: Instruct family/caregiver on patient safety  11/24/2024 1501 by Gisselle Heredia RN  Outcome: Progressing  Flowsheets (Taken 11/24/2024 1501)  Free From Fall Injury: Instruct family/caregiver on patient safety     Problem: Skin/Tissue Integrity - Adult  Goal: Skin integrity remains intact  11/25/2024 0109 by Denisha Rubio, RN  Outcome: Progressing  Flowsheets (Taken 11/25/2024 0109)  Skin Integrity Remains Intact:   Monitor for areas of redness and/or skin breakdown   Assess vascular access sites hourly  11/24/2024 1501 by Gisselle Heredia RN  Outcome: Progressing  Flowsheets (Taken 11/24/2024 1501)  Skin Integrity Remains Intact: Monitor for areas of redness and/or skin breakdown  Goal: Incisions, wounds, or drain sites healing without S/S of infection  11/25/2024 0109 by Denisha Rubio, RN  Outcome: Progressing  Flowsheets (Taken 11/25/2024 0109)  Incisions, Wounds, or Drain Sites Healing Without Sign and Symptoms of Infection:   TWICE DAILY: Assess and document dressing/incision, wound bed, drain sites and surrounding tissue   TWICE DAILY: Assess and document skin integrity  11/24/2024 1501 by Gisselle Heredia RN  Outcome: Progressing     Problem: Musculoskeletal - Adult  Goal: Return mobility to safest level of function  11/25/2024 0109 by Denisha Rubio RN  Outcome: Progressing  Flowsheets (Taken 11/25/2024 0109)  Return Mobility to Safest Level of Function:   Assess patient stability and activity tolerance for standing, transferring and ambulating with or without assistive devices   Assist with transfers and ambulation using safe patient handling equipment as needed   Ensure adequate

## 2024-11-27 ENCOUNTER — TELEPHONE (OUTPATIENT)
Dept: ORTHOPEDIC SURGERY | Age: 74
End: 2024-11-27

## 2024-11-27 NOTE — TELEPHONE ENCOUNTER
Svitlana called and stated incision on the back is red and warm to touch. Informed Nurse Dr. Zaidi is not back until 12/12/24 and to contact his main office at [facility]. Nurse took number and is going to call  
n/a

## 2024-12-10 DIAGNOSIS — Z98.1 STATUS POST LAMINECTOMY WITH SPINAL FUSION: Primary | ICD-10-CM

## 2024-12-12 ENCOUNTER — OFFICE VISIT (OUTPATIENT)
Dept: ORTHOPEDIC SURGERY | Age: 74
End: 2024-12-12

## 2024-12-12 ENCOUNTER — HOSPITAL ENCOUNTER (OUTPATIENT)
Dept: GENERAL RADIOLOGY | Age: 74
Discharge: HOME | End: 2024-12-14
Attending: ORTHOPAEDIC SURGERY
Payer: MEDICARE

## 2024-12-12 VITALS — BODY MASS INDEX: 35.88 KG/M2 | WEIGHT: 195 LBS | HEIGHT: 62 IN

## 2024-12-12 DIAGNOSIS — Z47.89 UNSPECIFIED ORTHOPEDIC AFTERCARE: Primary | ICD-10-CM

## 2024-12-12 DIAGNOSIS — Z98.1 STATUS POST LAMINECTOMY WITH SPINAL FUSION: ICD-10-CM

## 2024-12-12 PROCEDURE — 72100 X-RAY EXAM L-S SPINE 2/3 VWS: CPT

## 2024-12-12 PROCEDURE — 99024 POSTOP FOLLOW-UP VISIT: CPT | Performed by: PHYSICIAN ASSISTANT

## 2024-12-12 NOTE — ED PROVIDER NOTES
[facility]  EMERGENCY DEPARTMENT ENCOUNTER        PATIENT NAME: Terra Hills  MRN: [id number]  : 1950  BURCIAGA: 2024  PROVIDER: Jose Parker MD    Patient was seen and evaluated at 6:22 PM EST. Nurses Notes are reviewed and I agree except as noted in the HPI.  Chief Complaint   Patient presents with    Post-op Problem     pain     HISTORY OF PRESENT ILLNESS     A 74-year-old female presents with intractable lower back pain radiating to bilateral LE with difficulty ambulating (no leg weakness).  She is status post L2-L5 instrumentation and L2-L4 laminectomy postop day 7 at [facility] by Dr. Lawson.  She has been taking Percocet. She was seen by spine surgery yesterday in office and was prescribed Medrol Dosepak.  Pain is not getting better.  She was seen earlier in ED today with lumbar MRI done showing no signs of fluid collection or cauda equina syndrome, and she was subsequently discharged home.  Due to persistent pain and difficulty ambulating, she called her spine surgeon again and was advised to come back for pain control and possible admission.  She denies urinary retention.  No stool incontinence.  No saddle area numbness/tingling.    This HPI was provided by patient.     PAST MEDICAL HISTORY    has a past medical history of Abnormal liver function tests, Hyperlipidemia, Hypertension, Insulin resistance, Macular degeneration of left eye, Obstructive sleep apnea, Osteoarthritis, and Thyroid nodule.    SURGICAL HISTORY      has a past surgical history that includes hysteroscopy (2005); other surgical history (2005); Abdominal exploration surgery (1994); Ovary removal (Left); and Total hip arthroplasty (Left, 2024).    CURRENT MEDICATIONS       Previous Medications    AMLODIPINE (NORVASC) 5 MG TABLET    Take 1 tablet by mouth 2 times daily    AMOXICILLIN (AMOXIL) 500 MG CAPSULE    Take 2,000mg (4 capsules) by mouth 1 hour prior to dental cleaning or procedure.    
Detail Level: Simple

## 2024-12-27 RX ORDER — LISINOPRIL 20 MG/1
20 TABLET ORAL DAILY
Qty: 90 TABLET | Refills: 3 | Status: SHIPPED | OUTPATIENT
Start: 2024-12-27

## 2024-12-27 NOTE — TELEPHONE ENCOUNTER
Terra called requesting a refill of the below medication which has been pended for you:     Requested Prescriptions     Pending Prescriptions Disp Refills    lisinopril (PRINIVIL;ZESTRIL) 20 MG tablet [Pharmacy Med Name: Lisinopril 20 MG Oral Tablet] 90 tablet 3     Sig: TAKE 1 TABLET BY MOUTH DAILY       Last Appointment Date: 11/6/2024  Next Appointment Date: 3/11/2025    Allergies   Allergen Reactions    Simvastatin Other (See Comments)     Muscle cramps in legs

## 2024-12-30 ENCOUNTER — APPOINTMENT (OUTPATIENT)
Dept: INTERVENTIONAL RADIOLOGY/VASCULAR | Age: 74
End: 2024-12-30
Attending: FAMILY MEDICINE
Payer: MEDICARE

## 2024-12-31 ENCOUNTER — TELEPHONE (OUTPATIENT)
Dept: FAMILY MEDICINE CLINIC | Age: 74
End: 2024-12-31

## 2024-12-31 RX ORDER — POTASSIUM CHLORIDE 1500 MG/1
20 TABLET, EXTENDED RELEASE ORAL DAILY
Qty: 30 TABLET | Refills: 1 | Status: SHIPPED | OUTPATIENT
Start: 2024-12-31

## 2024-12-31 RX ORDER — FUROSEMIDE 40 MG/1
40 TABLET ORAL DAILY
Qty: 30 TABLET | Refills: 1 | Status: SHIPPED | OUTPATIENT
Start: 2024-12-31

## 2024-12-31 NOTE — TELEPHONE ENCOUNTER
Sent in script for lasix 40mg daily and some potassium to take while taking the lasix.  Stop the HCTZ while on lasix.

## 2024-12-31 NOTE — TELEPHONE ENCOUNTER
Patient called with home health nurse present at her house to discuss her bilat lower leg/feet swelling. Left leg has 2 + nonpitting edema and Rt leg has 1 + non pitting edema. Legs are not warm or red. Patient has been elevating legs as often as possible and wearing compression stockings but nurse did advise today to take them off as the zippers are digging into her skin due to the swelling and she was concerned of a wound forming due to this. She has been doubling her HCTZ 25 mg taking two daily. Nurse thinks more aggressive diuretic will be needed. Please send script to [facility].

## 2025-01-02 DIAGNOSIS — G89.18 POSTOPERATIVE PAIN: Primary | ICD-10-CM

## 2025-01-02 RX ORDER — PREGABALIN 75 MG/1
75 CAPSULE ORAL 3 TIMES DAILY
Qty: 90 CAPSULE | Refills: 0 | Status: SHIPPED | OUTPATIENT
Start: 2025-01-02 | End: 2025-02-01

## 2025-01-02 NOTE — PROGRESS NOTES
Patient having increase lower extremity edema and seems to have been more of a problem since gabapentin.  Suspect gabapentin may be causing the increased edema.  She also notes that she is now having increased leg pain and thought the gabapentin was helping, but now seems to be not noticing as much improvement.  Will try switching to lyrica to see if this helps better with the pain and may improve swelling.  Possibly could still have swelling from the lyrica, but is worth trying to see if there is any improvement in the amount of swelling.

## 2025-01-07 ENCOUNTER — OFFICE VISIT (OUTPATIENT)
Dept: PRIMARY CARE CLINIC | Age: 75
End: 2025-01-07
Payer: MEDICARE

## 2025-01-07 VITALS
HEIGHT: 63 IN | OXYGEN SATURATION: 95 % | SYSTOLIC BLOOD PRESSURE: 116 MMHG | TEMPERATURE: 97.4 F | WEIGHT: 204 LBS | DIASTOLIC BLOOD PRESSURE: 78 MMHG | BODY MASS INDEX: 36.14 KG/M2 | RESPIRATION RATE: 18 BRPM | HEART RATE: 72 BPM

## 2025-01-07 DIAGNOSIS — M79.2 NEUROPATHIC PAIN OF ANKLE, RIGHT: ICD-10-CM

## 2025-01-07 DIAGNOSIS — T50.905A ADVERSE EFFECT OF DRUG, INITIAL ENCOUNTER: ICD-10-CM

## 2025-01-07 DIAGNOSIS — R60.0 EDEMA OF BOTH LEGS: Primary | ICD-10-CM

## 2025-01-07 DIAGNOSIS — L03.116 CELLULITIS OF LEFT LOWER EXTREMITY: ICD-10-CM

## 2025-01-07 PROCEDURE — 1159F MED LIST DOCD IN RCRD: CPT | Performed by: FAMILY MEDICINE

## 2025-01-07 PROCEDURE — 3074F SYST BP LT 130 MM HG: CPT | Performed by: FAMILY MEDICINE

## 2025-01-07 PROCEDURE — G8400 PT W/DXA NO RESULTS DOC: HCPCS | Performed by: FAMILY MEDICINE

## 2025-01-07 PROCEDURE — M1299 PR FLU IMMUNIZE ORDER/ADMIN: HCPCS | Performed by: FAMILY MEDICINE

## 2025-01-07 PROCEDURE — 1160F RVW MEDS BY RX/DR IN RCRD: CPT | Performed by: FAMILY MEDICINE

## 2025-01-07 PROCEDURE — 1090F PRES/ABSN URINE INCON ASSESS: CPT | Performed by: FAMILY MEDICINE

## 2025-01-07 PROCEDURE — 1123F ACP DISCUSS/DSCN MKR DOCD: CPT | Performed by: FAMILY MEDICINE

## 2025-01-07 PROCEDURE — G8417 CALC BMI ABV UP PARAM F/U: HCPCS | Performed by: FAMILY MEDICINE

## 2025-01-07 PROCEDURE — 99214 OFFICE O/P EST MOD 30 MIN: CPT | Performed by: FAMILY MEDICINE

## 2025-01-07 PROCEDURE — G8427 DOCREV CUR MEDS BY ELIG CLIN: HCPCS | Performed by: FAMILY MEDICINE

## 2025-01-07 PROCEDURE — 3078F DIAST BP <80 MM HG: CPT | Performed by: FAMILY MEDICINE

## 2025-01-07 PROCEDURE — 1036F TOBACCO NON-USER: CPT | Performed by: FAMILY MEDICINE

## 2025-01-07 PROCEDURE — 3017F COLORECTAL CA SCREEN DOC REV: CPT | Performed by: FAMILY MEDICINE

## 2025-01-07 RX ORDER — FUROSEMIDE 40 MG/1
40 TABLET ORAL 2 TIMES DAILY
Qty: 60 TABLET | Refills: 1 | Status: SHIPPED | OUTPATIENT
Start: 2025-01-07

## 2025-01-07 RX ORDER — NORTRIPTYLINE HYDROCHLORIDE 10 MG/1
10 CAPSULE ORAL NIGHTLY
Qty: 30 CAPSULE | Refills: 2 | Status: SHIPPED | OUTPATIENT
Start: 2025-01-07

## 2025-01-07 RX ORDER — POTASSIUM CHLORIDE 1500 MG/1
20 TABLET, EXTENDED RELEASE ORAL 2 TIMES DAILY
Qty: 60 TABLET | Refills: 1 | Status: SHIPPED | OUTPATIENT
Start: 2025-01-07

## 2025-01-07 RX ORDER — CEPHALEXIN 500 MG/1
500 CAPSULE ORAL 3 TIMES DAILY
Qty: 30 CAPSULE | Refills: 0 | Status: SHIPPED | OUTPATIENT
Start: 2025-01-07 | End: 2025-01-17

## 2025-01-07 ASSESSMENT — PATIENT HEALTH QUESTIONNAIRE - PHQ9
SUM OF ALL RESPONSES TO PHQ QUESTIONS 1-9: 0
SUM OF ALL RESPONSES TO PHQ9 QUESTIONS 1 & 2: 0
SUM OF ALL RESPONSES TO PHQ QUESTIONS 1-9: 0
1. LITTLE INTEREST OR PLEASURE IN DOING THINGS: NOT AT ALL
2. FEELING DOWN, DEPRESSED OR HOPELESS: NOT AT ALL

## 2025-01-07 ASSESSMENT — ENCOUNTER SYMPTOMS
SHORTNESS OF BREATH: 0
COLOR CHANGE: 1
BACK PAIN: 0
COUGH: 0
CHEST TIGHTNESS: 0
WHEEZING: 0

## 2025-01-15 ENCOUNTER — TELEPHONE (OUTPATIENT)
Dept: FAMILY MEDICINE CLINIC | Age: 75
End: 2025-01-15

## 2025-01-15 NOTE — TELEPHONE ENCOUNTER
Patient had a friend that had acupuncture done after a knee surgery for nerve pain that helped and she wondered your thoughts on this. Her leg swelling has improved but the Elavil has not done anything for the nerve pain. Her daughter gave her an Ativan lastnight and that did help her some get some sleep. Is this something she could have from you?

## 2025-02-03 DIAGNOSIS — Z47.89 UNSPECIFIED ORTHOPEDIC AFTERCARE: Primary | ICD-10-CM

## 2025-02-05 ENCOUNTER — TELEPHONE (OUTPATIENT)
Dept: FAMILY MEDICINE CLINIC | Age: 75
End: 2025-02-05

## 2025-02-05 DIAGNOSIS — M54.16 LUMBAR RADICULOPATHY: ICD-10-CM

## 2025-02-05 DIAGNOSIS — M48.061 SPINAL STENOSIS OF LUMBAR REGION, UNSPECIFIED WHETHER NEUROGENIC CLAUDICATION PRESENT: Primary | ICD-10-CM

## 2025-02-05 NOTE — TELEPHONE ENCOUNTER
Patient was discharged from home physical therapy and would like a referral to continue PT at [facility] for her back. Referral was placed.

## 2025-02-13 ENCOUNTER — OFFICE VISIT (OUTPATIENT)
Dept: ORTHOPEDIC SURGERY | Age: 75
End: 2025-02-13
Payer: MEDICARE

## 2025-02-13 ENCOUNTER — HOSPITAL ENCOUNTER (OUTPATIENT)
Dept: GENERAL RADIOLOGY | Age: 75
Discharge: HOME | End: 2025-02-15
Payer: MEDICARE

## 2025-02-13 VITALS
OXYGEN SATURATION: 95 % | WEIGHT: 204 LBS | DIASTOLIC BLOOD PRESSURE: 88 MMHG | BODY MASS INDEX: 36.14 KG/M2 | HEIGHT: 63 IN | RESPIRATION RATE: 16 BRPM | SYSTOLIC BLOOD PRESSURE: 128 MMHG | HEART RATE: 87 BPM

## 2025-02-13 DIAGNOSIS — Z47.89 AFTERCARE FOLLOWING SURGERY OF THE MUSCULOSKELETAL SYSTEM: ICD-10-CM

## 2025-02-13 DIAGNOSIS — M54.50 LUMBAR SPINE PAIN: Primary | ICD-10-CM

## 2025-02-13 DIAGNOSIS — Z47.89 UNSPECIFIED ORTHOPEDIC AFTERCARE: ICD-10-CM

## 2025-02-13 PROCEDURE — 3079F DIAST BP 80-89 MM HG: CPT | Performed by: PHYSICIAN ASSISTANT

## 2025-02-13 PROCEDURE — 99214 OFFICE O/P EST MOD 30 MIN: CPT | Performed by: PHYSICIAN ASSISTANT

## 2025-02-13 PROCEDURE — 3074F SYST BP LT 130 MM HG: CPT | Performed by: PHYSICIAN ASSISTANT

## 2025-02-13 PROCEDURE — 1123F ACP DISCUSS/DSCN MKR DOCD: CPT | Performed by: PHYSICIAN ASSISTANT

## 2025-02-13 PROCEDURE — 99213 OFFICE O/P EST LOW 20 MIN: CPT | Performed by: PHYSICIAN ASSISTANT

## 2025-02-13 PROCEDURE — G8400 PT W/DXA NO RESULTS DOC: HCPCS | Performed by: PHYSICIAN ASSISTANT

## 2025-02-13 PROCEDURE — 3017F COLORECTAL CA SCREEN DOC REV: CPT | Performed by: PHYSICIAN ASSISTANT

## 2025-02-13 PROCEDURE — G8427 DOCREV CUR MEDS BY ELIG CLIN: HCPCS | Performed by: PHYSICIAN ASSISTANT

## 2025-02-13 PROCEDURE — 1090F PRES/ABSN URINE INCON ASSESS: CPT | Performed by: PHYSICIAN ASSISTANT

## 2025-02-13 PROCEDURE — 1160F RVW MEDS BY RX/DR IN RCRD: CPT | Performed by: PHYSICIAN ASSISTANT

## 2025-02-13 PROCEDURE — G8417 CALC BMI ABV UP PARAM F/U: HCPCS | Performed by: PHYSICIAN ASSISTANT

## 2025-02-13 PROCEDURE — 1159F MED LIST DOCD IN RCRD: CPT | Performed by: PHYSICIAN ASSISTANT

## 2025-02-13 PROCEDURE — 72100 X-RAY EXAM L-S SPINE 2/3 VWS: CPT

## 2025-02-13 PROCEDURE — 1036F TOBACCO NON-USER: CPT | Performed by: PHYSICIAN ASSISTANT

## 2025-02-13 NOTE — PROGRESS NOTES
Orthopaedic Progress Note      CHIEF COMPLAINT:  lumbar recheck    HISTORY OF PRESENT ILLNESS:    Patient returns to the office 3 months s/p L2-5 decompression and fusion. Patient is reporting severe right sided lumbar pain. Pain into the right lateral calf. Patient also reports numbness/tingling bilateral feet. Denies bladder or bowel dysfunction.     Past Medical History:    Past Medical History:   Diagnosis Date    Abnormal liver function tests     Mildly.    Hyperlipidemia     Hypertension     Stable.    Insulin resistance     Macular degeneration of left eye 01/01/2013    Obstructive sleep apnea     Currently utilizing BiPAP and EPAP pressure 5, IPAP pressure 9.    Osteoarthritis     Thyroid nodule     per pt was bx and came back benign       Past Surgical History:    Past Surgical History:   Procedure Laterality Date    ABDOMINAL EXPLORATION SURGERY  01/01/1994    With left salpingo-oophorectomy.    BACK SURGERY  11/11/2024    HYSTEROSCOPY  08/25/2005    With hysteroscopic resection of polyps and submucous fibroid performed by Dr. Cardona.    OTHER SURGICAL HISTORY  07/12/2005    Had intraductal papilloma of the right breast excised.    OVARY REMOVAL Left     TOTAL HIP ARTHROPLASTY Left 07/16/2024    HIP TOTAL ARTHROPLASTY ANTERIOR APPROACH performed by Nathaniel Estrella DO at [facility] OR         Current  Medications:  Current Outpatient Medications   Medication Sig Dispense Refill    furosemide (LASIX) 40 MG tablet Take 1 tablet by mouth 2 times daily 60 tablet 1    potassium chloride (KLOR-CON M) 20 MEQ extended release tablet Take 1 tablet by mouth 2 times daily 60 tablet 1    lisinopril (PRINIVIL;ZESTRIL) 20 MG tablet TAKE 1 TABLET BY MOUTH DAILY 90 tablet 3    docusate sodium (COLACE) 100 MG capsule Take 1 capsule by mouth 2 times daily      amLODIPine (NORVASC) 5 MG tablet Take 1 tablet by mouth 2 times daily 180 tablet 1    Respiratory Therapy Supplies MISC Replacement Bipap Machine pressure

## 2025-02-17 ENCOUNTER — HOSPITAL ENCOUNTER (OUTPATIENT)
Dept: CT IMAGING | Age: 75
Discharge: HOME | End: 2025-02-19
Payer: MEDICARE

## 2025-02-17 DIAGNOSIS — M54.50 LUMBAR SPINE PAIN: ICD-10-CM

## 2025-02-17 PROCEDURE — 72131 CT LUMBAR SPINE W/O DYE: CPT

## 2025-02-21 RX ORDER — AMLODIPINE BESYLATE 5 MG/1
5 TABLET ORAL 2 TIMES DAILY
Qty: 180 TABLET | Refills: 3 | Status: SHIPPED | OUTPATIENT
Start: 2025-02-21

## 2025-02-21 NOTE — TELEPHONE ENCOUNTER
Terra called requesting a refill of the below medication which has been pended for you:     Requested Prescriptions     Pending Prescriptions Disp Refills    amLODIPine (NORVASC) 5 MG tablet [Pharmacy Med Name: amLODIPine Besylate 5 MG Oral Tablet] 180 tablet 3     Sig: TAKE 1 TABLET BY MOUTH TWICE  DAILY       Last Appointment Date: 1/7/2025  Next Appointment Date: 3/11/2025    Allergies   Allergen Reactions    Simvastatin Other (See Comments)     Muscle cramps in legs

## 2025-03-10 ENCOUNTER — HOSPITAL ENCOUNTER (OUTPATIENT)
Age: 75
Discharge: HOME | End: 2025-03-10
Payer: MEDICARE

## 2025-03-10 ENCOUNTER — RESULTS FOLLOW-UP (OUTPATIENT)
Dept: FAMILY MEDICINE CLINIC | Age: 75
End: 2025-03-10

## 2025-03-10 DIAGNOSIS — I10 ESSENTIAL HYPERTENSION: ICD-10-CM

## 2025-03-10 DIAGNOSIS — R73.01 IMPAIRED FASTING BLOOD SUGAR: ICD-10-CM

## 2025-03-10 DIAGNOSIS — E78.2 MIXED HYPERLIPIDEMIA: ICD-10-CM

## 2025-03-10 LAB
ALBUMIN SERPL-MCNC: 4 G/DL (ref 3.5–5.2)
ALBUMIN/GLOB SERPL: 1.4 {RATIO} (ref 1–2.5)
ALP SERPL-CCNC: 154 U/L (ref 35–104)
ALT SERPL-CCNC: 11 U/L (ref 5–33)
ANION GAP SERPL CALCULATED.3IONS-SCNC: 10 MMOL/L (ref 9–17)
AST SERPL-CCNC: 13 U/L
BASOPHILS # BLD: <0.03 K/UL (ref 0–0.2)
BASOPHILS NFR BLD: 1 % (ref 0–2)
BILIRUB SERPL-MCNC: 0.3 MG/DL (ref 0.3–1.2)
BUN SERPL-MCNC: 14 MG/DL (ref 8–23)
BUN/CREAT SERPL: 23 (ref 9–20)
CALCIUM SERPL-MCNC: 10.2 MG/DL (ref 8.6–10.4)
CHLORIDE SERPL-SCNC: 105 MMOL/L (ref 98–107)
CHOLEST SERPL-MCNC: 166 MG/DL (ref 0–199)
CHOLESTEROL/HDL RATIO: 5.2
CO2 SERPL-SCNC: 26 MMOL/L (ref 20–31)
CREAT SERPL-MCNC: 0.6 MG/DL (ref 0.5–0.9)
EOSINOPHIL # BLD: 0.03 K/UL (ref 0–0.44)
EOSINOPHILS RELATIVE PERCENT: 1 % (ref 1–4)
ERYTHROCYTE [DISTWIDTH] IN BLOOD BY AUTOMATED COUNT: 13.8 % (ref 11.8–14.4)
GFR, ESTIMATED: >90 ML/MIN/1.73M2
GLUCOSE SERPL-MCNC: 91 MG/DL (ref 70–99)
HCT VFR BLD AUTO: 44.1 % (ref 36.3–47.1)
HDLC SERPL-MCNC: 32 MG/DL
HGB BLD-MCNC: 14.4 G/DL (ref 11.9–15.1)
IMM GRANULOCYTES # BLD AUTO: <0.03 K/UL (ref 0–0.3)
IMM GRANULOCYTES NFR BLD: 0 %
LDLC SERPL CALC-MCNC: 76 MG/DL (ref 0–100)
LYMPHOCYTES NFR BLD: 0.99 K/UL (ref 1.1–3.7)
LYMPHOCYTES RELATIVE PERCENT: 29 % (ref 24–43)
MCH RBC QN AUTO: 28.4 PG (ref 25.2–33.5)
MCHC RBC AUTO-ENTMCNC: 32.7 G/DL (ref 25.2–33.5)
MCV RBC AUTO: 87 FL (ref 82.6–102.9)
MONOCYTES NFR BLD: 0.35 K/UL (ref 0.1–1.2)
MONOCYTES NFR BLD: 10 % (ref 3–12)
NEUTROPHILS NFR BLD: 58 % (ref 36–65)
NEUTS SEG NFR BLD: 1.97 K/UL (ref 1.5–8.1)
NRBC BLD-RTO: 0 PER 100 WBC
PLATELET # BLD AUTO: ABNORMAL K/UL (ref 138–453)
PLATELET, FLUORESCENCE: 143 K/UL (ref 138–453)
PLATELETS.RETICULATED NFR BLD AUTO: 4.5 % (ref 1.1–10.3)
POTASSIUM SERPL-SCNC: 4 MMOL/L (ref 3.7–5.3)
PROT SERPL-MCNC: 6.8 G/DL (ref 6.4–8.3)
RBC # BLD AUTO: 5.07 M/UL (ref 3.95–5.11)
SODIUM SERPL-SCNC: 141 MMOL/L (ref 135–144)
TRIGL SERPL-MCNC: 290 MG/DL
VLDLC SERPL CALC-MCNC: 58 MG/DL (ref 1–30)
WBC OTHER # BLD: 3.4 K/UL (ref 3.5–11.3)

## 2025-03-10 PROCEDURE — 80053 COMPREHEN METABOLIC PANEL: CPT

## 2025-03-10 PROCEDURE — 36415 COLL VENOUS BLD VENIPUNCTURE: CPT

## 2025-03-10 PROCEDURE — 80061 LIPID PANEL: CPT

## 2025-03-10 PROCEDURE — 85025 COMPLETE CBC W/AUTO DIFF WBC: CPT

## 2025-03-10 PROCEDURE — 83036 HEMOGLOBIN GLYCOSYLATED A1C: CPT

## 2025-03-11 ENCOUNTER — OFFICE VISIT (OUTPATIENT)
Dept: FAMILY MEDICINE CLINIC | Age: 75
End: 2025-03-11
Payer: MEDICARE

## 2025-03-11 VITALS
TEMPERATURE: 97.7 F | RESPIRATION RATE: 16 BRPM | SYSTOLIC BLOOD PRESSURE: 120 MMHG | DIASTOLIC BLOOD PRESSURE: 84 MMHG | WEIGHT: 195 LBS | HEIGHT: 63 IN | BODY MASS INDEX: 34.55 KG/M2 | OXYGEN SATURATION: 96 % | HEART RATE: 92 BPM

## 2025-03-11 DIAGNOSIS — I10 ESSENTIAL HYPERTENSION: Primary | ICD-10-CM

## 2025-03-11 DIAGNOSIS — G47.33 OSA (OBSTRUCTIVE SLEEP APNEA): ICD-10-CM

## 2025-03-11 DIAGNOSIS — R73.01 IMPAIRED FASTING BLOOD SUGAR: ICD-10-CM

## 2025-03-11 DIAGNOSIS — M54.16 LUMBAR RADICULOPATHY: ICD-10-CM

## 2025-03-11 DIAGNOSIS — E78.2 MIXED HYPERLIPIDEMIA: ICD-10-CM

## 2025-03-11 LAB
EST. AVERAGE GLUCOSE BLD GHB EST-MCNC: 105 MG/DL
HBA1C MFR BLD: 5.3 % (ref 4–6)

## 2025-03-11 PROCEDURE — G8400 PT W/DXA NO RESULTS DOC: HCPCS | Performed by: FAMILY MEDICINE

## 2025-03-11 PROCEDURE — 99214 OFFICE O/P EST MOD 30 MIN: CPT | Performed by: FAMILY MEDICINE

## 2025-03-11 PROCEDURE — G2211 COMPLEX E/M VISIT ADD ON: HCPCS | Performed by: FAMILY MEDICINE

## 2025-03-11 PROCEDURE — G8427 DOCREV CUR MEDS BY ELIG CLIN: HCPCS | Performed by: FAMILY MEDICINE

## 2025-03-11 PROCEDURE — 1160F RVW MEDS BY RX/DR IN RCRD: CPT | Performed by: FAMILY MEDICINE

## 2025-03-11 PROCEDURE — 1090F PRES/ABSN URINE INCON ASSESS: CPT | Performed by: FAMILY MEDICINE

## 2025-03-11 PROCEDURE — 3017F COLORECTAL CA SCREEN DOC REV: CPT | Performed by: FAMILY MEDICINE

## 2025-03-11 PROCEDURE — G8417 CALC BMI ABV UP PARAM F/U: HCPCS | Performed by: FAMILY MEDICINE

## 2025-03-11 PROCEDURE — 1123F ACP DISCUSS/DSCN MKR DOCD: CPT | Performed by: FAMILY MEDICINE

## 2025-03-11 PROCEDURE — 1036F TOBACCO NON-USER: CPT | Performed by: FAMILY MEDICINE

## 2025-03-11 PROCEDURE — 1159F MED LIST DOCD IN RCRD: CPT | Performed by: FAMILY MEDICINE

## 2025-03-11 PROCEDURE — 3079F DIAST BP 80-89 MM HG: CPT | Performed by: FAMILY MEDICINE

## 2025-03-11 PROCEDURE — 3074F SYST BP LT 130 MM HG: CPT | Performed by: FAMILY MEDICINE

## 2025-03-11 RX ORDER — HYDROCHLOROTHIAZIDE 25 MG/1
25 TABLET ORAL EVERY MORNING
Qty: 90 TABLET | Refills: 1 | Status: SHIPPED | OUTPATIENT
Start: 2025-03-11

## 2025-03-11 RX ORDER — DIAZEPAM 5 MG/1
TABLET ORAL
COMMUNITY
Start: 2025-02-20

## 2025-03-11 ASSESSMENT — ENCOUNTER SYMPTOMS
TROUBLE SWALLOWING: 0
BACK PAIN: 1
SINUS PRESSURE: 0
VOMITING: 0
ABDOMINAL PAIN: 0
EYE DISCHARGE: 0
SHORTNESS OF BREATH: 0
EYE REDNESS: 0
COUGH: 0
CONSTIPATION: 0
RHINORRHEA: 0
SORE THROAT: 0
NAUSEA: 0
WHEEZING: 0
DIARRHEA: 0

## 2025-03-11 NOTE — PROGRESS NOTES
Patient declines covid vaccine at this time. Declines colonoscopy and dexa screenings. Will schedule MAWV later in the year.   
cream but reports no noticeable side effects. She is considering discontinuing Lasix and resuming hydrochlorothiazide, which she has not taken since her first surgery in 07/2024. She finds topical treatments helpful. She is currently using a compounded cream.             Patient's recent lab reports are as follows:    Results for orders placed or performed during the hospital encounter of 03/10/25   Lipid Panel   Result Value Ref Range    Cholesterol, Total 166 0 - 199 mg/dL    HDL 32 (L) >40 mg/dL    LDL Cholesterol 76 0 - 100 mg/dL    Chol/HDL Ratio 5.2     Triglycerides 290 (H) <150 mg/dL    VLDL 58 (H) 1 - 30 mg/dL   Hemoglobin A1C   Result Value Ref Range    Hemoglobin A1C 5.3 4.0 - 6.0 %    Estimated Avg Glucose 105 mg/dL   Comprehensive Metabolic Panel   Result Value Ref Range    Sodium 141 135 - 144 mmol/L    Potassium 4.0 3.7 - 5.3 mmol/L    Chloride 105 98 - 107 mmol/L    CO2 26 20 - 31 mmol/L    Anion Gap 10 9 - 17 mmol/L    Glucose 91 70 - 99 mg/dL    BUN 14 8 - 23 mg/dL    Creatinine 0.6 0.5 - 0.9 mg/dL    Est, Glom Filt Rate >90 >60 mL/min/1.73m2    BUN/Creatinine Ratio 23 (H) 9 - 20    Calcium 10.2 8.6 - 10.4 mg/dL    Total Protein 6.8 6.4 - 8.3 g/dL    Albumin 4.0 3.5 - 5.2 g/dL    Albumin/Globulin Ratio 1.4 1.0 - 2.5    Total Bilirubin 0.3 0.3 - 1.2 mg/dL    Alkaline Phosphatase 154 (H) 35 - 104 U/L    ALT 11 5 - 33 U/L    AST 13 <32 U/L   CBC with Auto Differential   Result Value Ref Range    WBC 3.4 (L) 3.5 - 11.3 k/uL    RBC 5.07 3.95 - 5.11 m/uL    Hemoglobin 14.4 11.9 - 15.1 g/dL    Hematocrit 44.1 36.3 - 47.1 %    MCV 87.0 82.6 - 102.9 fL    MCH 28.4 25.2 - 33.5 pg    MCHC 32.7 25.2 - 33.5 g/dL    RDW 13.8 11.8 - 14.4 %    Platelets See Reflexed IPF Result 138 - 453 k/uL    Platelet, Fluorescence 143 138 - 453 k/uL    Platelet, Immature Fraction 4.5 1.1 - 10.3 %    NRBC Automated 0.0 0.0 per 100 WBC    Neutrophils % 58 36 - 65 %    Lymphocytes % 29 24 - 43 %    Monocytes % 10 3 - 12 %

## 2025-03-11 NOTE — PATIENT INSTRUCTIONS
Hospital Outpatient Visit on 03/10/2025   Component Date Value Ref Range Status    Cholesterol, Total 03/10/2025 166  0 - 199 mg/dL Final    Comment:    Cholesterol Guidelines:      <200  Desirable   200-240  Borderline      >240  Undesirable         HDL 03/10/2025 32 (L)  >40 mg/dL Final    Comment:    HDL Guidelines:    <40     Undesirable   40-59    Borderline    >59     Desirable         LDL Cholesterol 03/10/2025 76  0 - 100 mg/dL Final    Comment:    LDL Guidelines:     <100    Desirable   100-129   Near to/above Desirable   130-159   Borderline      >159   Undesirable     Direct (measured) LDL and calculated LDL are not interchangeable tests.      Chol/HDL Ratio 03/10/2025 5.2   Final    Triglycerides 03/10/2025 290 (H)  <150 mg/dL Final    Comment:    Triglyceride Guidelines:     <150   Desirable   150-199  Borderline   200-499  High     >499   Very high   Based on AHA Guidelines for fasting triglyceride, October 2012.         VLDL 03/10/2025 58 (H)  1 - 30 mg/dL Final    Hemoglobin A1C 03/10/2025 5.3  4.0 - 6.0 % Final    Estimated Avg Glucose 03/10/2025 105  mg/dL Final    Comment: The ADA and AACC recommend providing the estimated average glucose result to permit better   patient understanding of their HBA1c result.      Sodium 03/10/2025 141  135 - 144 mmol/L Final    Potassium 03/10/2025 4.0  3.7 - 5.3 mmol/L Final    Chloride 03/10/2025 105  98 - 107 mmol/L Final    CO2 03/10/2025 26  20 - 31 mmol/L Final    Anion Gap 03/10/2025 10  9 - 17 mmol/L Final    Glucose 03/10/2025 91  70 - 99 mg/dL Final    BUN 03/10/2025 14  8 - 23 mg/dL Final    Creatinine 03/10/2025 0.6  0.5 - 0.9 mg/dL Final    Est, Glom Filt Rate 03/10/2025 >90  >60 mL/min/1.73m2 Final    Comment:       These results are not intended for use in patients <18 years of age.        eGFR results are calculated without a race factor using the 2021 CKD-EPI equation.  Careful clinical correlation is recommended, particularly when comparing to

## 2025-03-17 RX ORDER — DIAZEPAM 5 MG/1
5 TABLET ORAL NIGHTLY
Qty: 30 TABLET | OUTPATIENT
Start: 2025-03-17

## 2025-04-07 ENCOUNTER — TELEPHONE (OUTPATIENT)
Dept: ORTHOPEDIC SURGERY | Age: 75
End: 2025-04-07

## 2025-04-07 NOTE — TELEPHONE ENCOUNTER
Called and spoke with patient. Informed her we refer to Dr. Greer and gave her his information to call and schedule an appointment. She had no further questions at this time.

## 2025-04-07 NOTE — TELEPHONE ENCOUNTER
Pt states she had back surgery 11/12/2024 with Dr. Rao at [facility] in [place], but she says that she is still having pain and has a screw that's loose in site. She's unsure of next steps and would be interested in a second opinion. She would like to know who Dr. Estrella would recommend. Please call her to advise.

## 2025-04-08 ENCOUNTER — TELEPHONE (OUTPATIENT)
Dept: FAMILY MEDICINE CLINIC | Age: 75
End: 2025-04-08

## 2025-04-08 DIAGNOSIS — M48.061 SPINAL STENOSIS OF LUMBAR REGION, UNSPECIFIED WHETHER NEUROGENIC CLAUDICATION PRESENT: Primary | ICD-10-CM

## 2025-04-08 NOTE — TELEPHONE ENCOUNTER
Patient called requesting we place referral to Dr Isai Greer at [facility] in [place] for a second opinion for her back. This was completed and faxed to his office.

## 2025-04-16 DIAGNOSIS — I10 ESSENTIAL HYPERTENSION: ICD-10-CM

## 2025-04-17 RX ORDER — ROSUVASTATIN CALCIUM 20 MG/1
20 TABLET, COATED ORAL NIGHTLY
Qty: 90 TABLET | Refills: 3 | Status: SHIPPED | OUTPATIENT
Start: 2025-04-17

## 2025-04-17 RX ORDER — METOPROLOL TARTRATE 50 MG
50 TABLET ORAL DAILY
Qty: 90 TABLET | Refills: 3 | Status: SHIPPED | OUTPATIENT
Start: 2025-04-17

## 2025-04-17 RX ORDER — MELOXICAM 15 MG/1
15 TABLET ORAL DAILY
Qty: 90 TABLET | Refills: 1 | Status: SHIPPED | OUTPATIENT
Start: 2025-04-17

## 2025-04-17 NOTE — TELEPHONE ENCOUNTER
Terra called requesting a refill of the below medication which has been pended for you: patient would like to restart. States her current ortho said it was ok with her previous back surgery    Requested Prescriptions     Pending Prescriptions Disp Refills    meloxicam (MOBIC) 15 MG tablet 90 tablet 1     Sig: Take 1 tablet by mouth daily       Last Appointment Date: 3/11/2025  Next Appointment Date: 9/11/2025    Allergies   Allergen Reactions    Simvastatin Other (See Comments)     Muscle cramps in legs

## 2025-04-17 NOTE — TELEPHONE ENCOUNTER
Terra called requesting a refill of the below medication which has been pended for you:     Requested Prescriptions     Pending Prescriptions Disp Refills    rosuvastatin (CRESTOR) 20 MG tablet [Pharmacy Med Name: Rosuvastatin Calcium 20 MG Oral Tablet] 90 tablet 3     Sig: TAKE 1 TABLET BY MOUTH AT  BEDTIME    metoprolol tartrate (LOPRESSOR) 50 MG tablet [Pharmacy Med Name: Metoprolol Tartrate 50 MG Oral Tablet] 90 tablet 3     Sig: TAKE 1 TABLET BY MOUTH DAILY       Last Appointment Date: 3/11/2025  Next Appointment Date: 9/11/2025    Allergies   Allergen Reactions    Simvastatin Other (See Comments)     Muscle cramps in legs

## 2025-04-29 ENCOUNTER — HOSPITAL ENCOUNTER (OUTPATIENT)
Dept: BONE DENSITY | Age: 75
Discharge: HOME | End: 2025-05-01
Payer: MEDICARE

## 2025-04-29 DIAGNOSIS — M85.88 OTHER SPECIFIED DISORDERS OF BONE DENSITY AND STRUCTURE, OTHER SITE: ICD-10-CM

## 2025-04-29 PROCEDURE — 77080 DXA BONE DENSITY AXIAL: CPT

## 2025-05-06 NOTE — TELEPHONE ENCOUNTER
Terra called requesting a refill of the below medication which has been pended for you:     Requested Prescriptions     Pending Prescriptions Disp Refills    Semaglutide-Weight Management (WEGOVY) 1.7 MG/0.75ML SOAJ SC injection 2 mL 5     Sig: Semaglutide base 1.8 mg Niacinamide 1 mg Methylcobalamin 4 mcg/0.5 mL Inject 0.5 mL once weekly as directed.       Last Appointment Date: 3/11/2025  Next Appointment Date: 9/11/2025    Allergies   Allergen Reactions    Simvastatin Other (See Comments)     Muscle cramps in legs

## 2025-06-05 RX ORDER — SODIUM CHLORIDE, SODIUM LACTATE, POTASSIUM CHLORIDE, CALCIUM CHLORIDE 600; 310; 30; 20 MG/100ML; MG/100ML; MG/100ML; MG/100ML
INJECTION, SOLUTION INTRAVENOUS CONTINUOUS
OUTPATIENT
Start: 2025-06-05

## 2025-06-05 NOTE — DISCHARGE INSTRUCTIONS
Pre-operative Instructions           NOTHING to eat or drink after midnight the night prior to surgery   (This includes gum, candy, mints, chewing tobacco, etc). Smoking cessation is always advised.    Please arrive at the surgery center (Entrance B) by 10:00 AM on 6/27/2025 (or as directed by your surgeon's office).  See Directons to Surgery Center on next page.     Please take only the following medication(s) the day of surgery with a small sip of water: amlodipine (Norvasc), metoprolol (Lopressor)    Please hold lisinopril x 24 hours.     Please hold semaglutide one week prior to surgery, and discuss this with prescribing provider if needed.       If applicable:   -Use/bring daily inhalers with you   -Do not take diabetic medications on the day of surgery.    Please stop any blood thinning medications  AS DIRECTED BY PRESCRIBING PROVIDER! : meloxicam, fish oil    Failure to stop these medications as instructed (too soon or too late) may result in injury to you, or your surgery may need to be rescheduled.   Below is a list of some examples for your reference. This is not an all-inclusive list and if you have any questions/concerns, please check with your surgeon's office.     Antiplatelets : (stop blood cells (called platelets) from sticking together and forming a blood clot):   Aspirin (Bufferin, Ecotrin), Clopidogrel (Plavix), Ticagrelor (Brilinta), Prasugrel (Effient), Dipyridamole/aspirin (Aggrenox), Ticlopidine (Ticlid), Eptifibatide (Integrilin)    Anticoagulants: (slow down your body's process of making clots):   Warfarin (Coumadin), Rivaroxaban (Xarelto), Dabigatran (Pradaxa), Apixaban (Eliquis), Edoxaban (Savaysa), Heparin, Enoxaparin (Lovenox), Fondaparinux (Arixtra)    NSAIDS: Aspirin (Bufferin, Ecotrin), Ibuprofen (Motrin, Nuprin,Advil), Naproxen (Aleve),Meloxicam (Mobic), Celecoxib (Celebrex), Diclofenac (Voltaren), Etodolac (Lodine), Indomethacin, Ketorolac, Nabumetone (Relafen), Oxaprozin (Daypro),  bring any inhalers if you are currently using one.    It is ok to brush your teeth but do not swallow any water.    You may be required to provide a urine sample upon your arrival to the pre-op area, so please take this into consideration prior to using the restroom.    No jewelry or piercing's to be worn into surgery because you might be injured because of them.    No contact lenses to be worn.  It is ok to wear your glasses in pre op but they will be removed prior to going into the operating room.    Dentures/partials will likely need to be removed in pre op depending on your type of anesthesia, please do not use adhesives on the day of surgery.    Bathe as instructed with the special soap given to you.  No lotions, powders or creams after bathing.    Wear loose comfortable clothing / shoes that are easy to get on and off over wounds or casts.    If you are going to be admitted after surgery please bring your Cpap or BiPap if you have it at home.    Keep patient belongings to a minimum and leave valuables at home. If you are staying overnight with us, please bring a SMALL bag of personal items.  We cannot accommodate large items, like suitcases.    If you are going home after anesthesia or sedation then you must have a responsible adult with you to take you home and to be with you for the first 24 hours after surgery. If you do not have someone to stay with you your surgery might get cancelled.  Please contact your surgeon's office to see if other arrangements can be made if you can not find someone to stay with you.    If you have any other questions on the day of surgery please contact [phone] or [phone]    If you have any other questions regarding your procedure/surgery please call  your surgeon's office.

## 2025-06-13 ENCOUNTER — HOSPITAL ENCOUNTER (OUTPATIENT)
Dept: PREADMISSION TESTING | Age: 75
Discharge: HOME | End: 2025-06-17
Payer: MEDICARE

## 2025-06-13 VITALS
BODY MASS INDEX: 36.86 KG/M2 | DIASTOLIC BLOOD PRESSURE: 64 MMHG | OXYGEN SATURATION: 96 % | HEART RATE: 63 BPM | TEMPERATURE: 97.9 F | HEIGHT: 63 IN | WEIGHT: 208 LBS | RESPIRATION RATE: 16 BRPM | SYSTOLIC BLOOD PRESSURE: 125 MMHG

## 2025-06-13 LAB
ANION GAP SERPL CALCULATED.3IONS-SCNC: 11 MMOL/L (ref 9–16)
BUN SERPL-MCNC: 18 MG/DL (ref 8–23)
CALCIUM SERPL-MCNC: 10.3 MG/DL (ref 8.6–10.4)
CHLORIDE SERPL-SCNC: 105 MMOL/L (ref 98–107)
CO2 SERPL-SCNC: 27 MMOL/L (ref 20–31)
CREAT SERPL-MCNC: 0.8 MG/DL (ref 0.6–0.9)
ERYTHROCYTE [DISTWIDTH] IN BLOOD BY AUTOMATED COUNT: 13.7 % (ref 11.8–14.4)
GFR, ESTIMATED: 78 ML/MIN/1.73M2
GLUCOSE SERPL-MCNC: 97 MG/DL (ref 74–99)
HCT VFR BLD AUTO: 44.1 % (ref 36.3–47.1)
HGB BLD-MCNC: 14.7 G/DL (ref 11.9–15.1)
MCH RBC QN AUTO: 29.6 PG (ref 25.2–33.5)
MCHC RBC AUTO-ENTMCNC: 33.3 G/DL (ref 28.4–34.8)
MCV RBC AUTO: 88.9 FL (ref 82.6–102.9)
NRBC BLD-RTO: 0 PER 100 WBC
PLATELET # BLD AUTO: 146 K/UL (ref 138–453)
PMV BLD AUTO: 10.9 FL (ref 8.1–13.5)
POTASSIUM SERPL-SCNC: 4.2 MMOL/L (ref 3.7–5.3)
RBC # BLD AUTO: 4.96 M/UL (ref 3.95–5.11)
SODIUM SERPL-SCNC: 143 MMOL/L (ref 136–145)
WBC OTHER # BLD: 4.4 K/UL (ref 3.5–11.3)

## 2025-06-13 PROCEDURE — 80048 BASIC METABOLIC PNL TOTAL CA: CPT

## 2025-06-13 PROCEDURE — 85027 COMPLETE CBC AUTOMATED: CPT

## 2025-06-13 PROCEDURE — 36415 COLL VENOUS BLD VENIPUNCTURE: CPT

## 2025-06-13 PROCEDURE — 93005 ELECTROCARDIOGRAM TRACING: CPT | Performed by: ANESTHESIOLOGY

## 2025-06-13 NOTE — H&P
History and Physical    Pt Name: Terra Hills  MRN: [id number]  YOB: 1950  Date of evaluation: 6/13/2025  Primary Care Physician: Desirae Calhoun DO    SUBJECTIVE:   History of Chief Complaint:    Terra Hills is a 74 y.o. female who presents for PAT appointment. Patient complains of lower back pain, right lower extremity pain. She report she is s/p back surgery (lumbar fusion) with Dr. Lawson in [place], [place]. She reports her symptoms are worse and she uses a cane for ambulation as need when out and about. She reports she has to stop and rest after a short distance due to pain. 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 Greer. Patient has been scheduled for ANTERIOR LUMBAR INTERBODY FUSION L5-S1, REVISION POSTERIOR INSTRUMENTATION L5 WITH EXTENSION TO PELVIS.   Allergies  is allergic to simvastatin.  Medications  Prior to Admission medications    Medication Sig Start Date End Date Taking? Authorizing Provider   NONFORMULARY daily CBD tincture and balm stick   Yes Provider, MD Sherie   rosuvastatin (CRESTOR) 20 MG tablet TAKE 1 TABLET BY MOUTH AT  BEDTIME 4/17/25  Yes Desirae Calhoun DO   metoprolol tartrate (LOPRESSOR) 50 MG tablet TAKE 1 TABLET BY MOUTH DAILY 4/17/25  Yes Desirae Calhoun DO   meloxicam (MOBIC) 15 MG tablet Take 1 tablet by mouth daily 4/17/25  Yes Desirae Calhoun DO   hydroCHLOROthiazide (HYDRODIURIL) 25 MG tablet Take 1 tablet by mouth every morning 3/11/25  Yes Desirae Calhoun DO   amLODIPine (NORVASC) 5 MG tablet TAKE 1 TABLET BY MOUTH TWICE  DAILY 2/21/25  Yes Desirae Calhoun DO   lisinopril (PRINIVIL;ZESTRIL) 20 MG tablet TAKE 1 TABLET BY MOUTH DAILY 12/27/24  Yes Desirae Calhoun DO   docusate sodium (COLACE) 100 MG capsule Take 1 capsule by mouth 2 times daily   Yes

## 2025-06-13 NOTE — PROGRESS NOTES
Anesthesia Focused Assessment  Upcoming surgery:   6/27/2025 ANTERIOR LUMBAR INTERBODY FUSION L5-S1, REVISION POSTERIOR INSTRUMENTATION L5 WITH EXTENSION TO PELVIS  (MEDTRONIC, MAZOR ROBOT, STEALTH NAVIGATION, C-ARM, O-ARM, SAIRA SPINE TABLE, *CELL-SAVER*) Isai Greer DO     History of anesthesia complications:  no  Family history of anesthesia complications:  daughter and mother: PONV, prolonged emergence    METS functional capacity:   -Moderate/Excellent >4     + Covid-19 test in last 8 weeks? no    DERIAN:                                             yes   If yes, machine?:                          bi-pap    Type 1 DM:                                   no  T2DM:                                           no    Coronary Artery Disease:             no  Hypertension:                               yes  Defib / AICD / Pacemaker:           no    Renal Failure:                               no  If yes, on dialysis?:                           Active smoker:                              no  Drinks Alcohol:                              rare  Illicit drugs:                                    no    Dentition:                                      benign     Past Medical History:   Diagnosis Date    Abnormal liver function tests     Mildly.    Hyperlipidemia     Hypertension     Stable.    Insulin resistance     Lumbar radiculopathy     Macular degeneration of left eye 01/01/2013    Mechanical breakdown of internal fixation device of vertebrae 06/2025    Obstructive sleep apnea     Currently utilizing BiPAP and EPAP pressure 5, IPAP pressure 9.    Osteoarthritis     Thyroid nodule     per pt was bx and came back benign    Under care of team     PCP- Dr Desirae Calhoun-last visit 3/2025   Patient was evaluated in PAT & anesthesia guidelines were applied.     NPO guidelines, medication instructions and scheduled arrival time were reviewed with patient.     I advised patient/patient family to please contact surgeons

## 2025-06-13 NOTE — H&P (VIEW-ONLY)
History and Physical    Pt Name: Terra Hills  MRN: [id number]  YOB: 1950  Date of evaluation: 6/13/2025  Primary Care Physician: Desirae Calhoun DO    SUBJECTIVE:   History of Chief Complaint:    Terra Hills is a 74 y.o. female who presents for PAT appointment. Patient complains of lower back pain, right lower extremity pain. She report she is s/p back surgery (lumbar fusion) with Dr. Lawson in [place], [place]. She reports her symptoms are worse and she uses a cane for ambulation as need when out and about. She reports she has to stop and rest after a short distance due to pain. 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 Greer. Patient has been scheduled for ANTERIOR LUMBAR INTERBODY FUSION L5-S1, REVISION POSTERIOR INSTRUMENTATION L5 WITH EXTENSION TO PELVIS.   Allergies  is allergic to simvastatin.  Medications  Prior to Admission medications    Medication Sig Start Date End Date Taking? Authorizing Provider   NONFORMULARY daily CBD tincture and balm stick   Yes Provider, MD Sherie   rosuvastatin (CRESTOR) 20 MG tablet TAKE 1 TABLET BY MOUTH AT  BEDTIME 4/17/25  Yes Desirae Calhoun DO   metoprolol tartrate (LOPRESSOR) 50 MG tablet TAKE 1 TABLET BY MOUTH DAILY 4/17/25  Yes Desirae Calhoun DO   meloxicam (MOBIC) 15 MG tablet Take 1 tablet by mouth daily 4/17/25  Yes Desirae Calhoun DO   hydroCHLOROthiazide (HYDRODIURIL) 25 MG tablet Take 1 tablet by mouth every morning 3/11/25  Yes Desirae Calhoun DO   amLODIPine (NORVASC) 5 MG tablet TAKE 1 TABLET BY MOUTH TWICE  DAILY 2/21/25  Yes Desirae Calhoun DO   lisinopril (PRINIVIL;ZESTRIL) 20 MG tablet TAKE 1 TABLET BY MOUTH DAILY 12/27/24  Yes Desirae Calhoun DO   docusate sodium (COLACE) 100 MG capsule Take 1 capsule by mouth 2 times daily   Yes

## 2025-06-14 LAB
EKG ATRIAL RATE: 60 BPM
EKG P AXIS: 11 DEGREES
EKG P-R INTERVAL: 194 MS
EKG Q-T INTERVAL: 414 MS
EKG QRS DURATION: 86 MS
EKG QTC CALCULATION (BAZETT): 414 MS
EKG R AXIS: -13 DEGREES
EKG T AXIS: 20 DEGREES
EKG VENTRICULAR RATE: 60 BPM

## 2025-06-14 PROCEDURE — 93010 ELECTROCARDIOGRAM REPORT: CPT | Performed by: INTERNAL MEDICINE

## 2025-06-26 ENCOUNTER — ANESTHESIA EVENT (OUTPATIENT)
Dept: OPERATING ROOM | Age: 75
DRG: 451 | End: 2025-06-26
Payer: MEDICARE

## 2025-06-27 ENCOUNTER — APPOINTMENT (OUTPATIENT)
Dept: GENERAL RADIOLOGY | Age: 75
DRG: 451 | End: 2025-06-27
Attending: STUDENT IN AN ORGANIZED HEALTH CARE EDUCATION/TRAINING PROGRAM
Payer: MEDICARE

## 2025-06-27 ENCOUNTER — HOSPITAL ENCOUNTER (INPATIENT)
Age: 75
LOS: 4 days | Discharge: HOME | DRG: 451 | End: 2025-07-01
Attending: STUDENT IN AN ORGANIZED HEALTH CARE EDUCATION/TRAINING PROGRAM | Admitting: STUDENT IN AN ORGANIZED HEALTH CARE EDUCATION/TRAINING PROGRAM
Payer: MEDICARE

## 2025-06-27 ENCOUNTER — ANESTHESIA (OUTPATIENT)
Dept: OPERATING ROOM | Age: 75
DRG: 451 | End: 2025-06-27
Payer: MEDICARE

## 2025-06-27 DIAGNOSIS — G89.18 POST-OP PAIN: Primary | ICD-10-CM

## 2025-06-27 PROBLEM — M51.369 ADJACENT SEGMENT DISEASE OF LUMBAR SPINE WITH HISTORY OF FUSION PROCEDURE: Status: ACTIVE | Noted: 2025-06-27

## 2025-06-27 PROBLEM — M48.062 LUMBAR STENOSIS WITH NEUROGENIC CLAUDICATION: Status: ACTIVE | Noted: 2025-06-27

## 2025-06-27 PROBLEM — Z98.1 ADJACENT SEGMENT DISEASE OF LUMBAR SPINE WITH HISTORY OF FUSION PROCEDURE: Status: ACTIVE | Noted: 2025-06-27

## 2025-06-27 LAB — POTASSIUM BLD-SCNC: 3.7 MMOL/L (ref 3.5–4.5)

## 2025-06-27 PROCEDURE — 6370000000 HC RX 637 (ALT 250 FOR IP): Performed by: CHIROPRACTOR

## 2025-06-27 PROCEDURE — C1713 ANCHOR/SCREW BN/BN,TIS/BN: HCPCS | Performed by: STUDENT IN AN ORGANIZED HEALTH CARE EDUCATION/TRAINING PROGRAM

## 2025-06-27 PROCEDURE — C1889 IMPLANT/INSERT DEVICE, NOC: HCPCS | Performed by: STUDENT IN AN ORGANIZED HEALTH CARE EDUCATION/TRAINING PROGRAM

## 2025-06-27 PROCEDURE — 2500000003 HC RX 250 WO HCPCS

## 2025-06-27 PROCEDURE — 84132 ASSAY OF SERUM POTASSIUM: CPT

## 2025-06-27 PROCEDURE — 8E0WXBZ COMPUTER ASSISTED PROCEDURE OF TRUNK REGION: ICD-10-PCS | Performed by: STUDENT IN AN ORGANIZED HEALTH CARE EDUCATION/TRAINING PROGRAM

## 2025-06-27 PROCEDURE — 8E0WXBF COMPUTER ASSISTED PROCEDURE OF TRUNK REGION, WITH FLUOROSCOPY: ICD-10-PCS | Performed by: STUDENT IN AN ORGANIZED HEALTH CARE EDUCATION/TRAINING PROGRAM

## 2025-06-27 PROCEDURE — 72100 X-RAY EXAM L-S SPINE 2/3 VWS: CPT

## 2025-06-27 PROCEDURE — 2500000003 HC RX 250 WO HCPCS: Performed by: STUDENT IN AN ORGANIZED HEALTH CARE EDUCATION/TRAINING PROGRAM

## 2025-06-27 PROCEDURE — 2580000003 HC RX 258: Performed by: NURSE ANESTHETIST, CERTIFIED REGISTERED

## 2025-06-27 PROCEDURE — 3700000001 HC ADD 15 MINUTES (ANESTHESIA): Performed by: STUDENT IN AN ORGANIZED HEALTH CARE EDUCATION/TRAINING PROGRAM

## 2025-06-27 PROCEDURE — 0QP004Z REMOVAL OF INTERNAL FIXATION DEVICE FROM LUMBAR VERTEBRA, OPEN APPROACH: ICD-10-PCS | Performed by: STUDENT IN AN ORGANIZED HEALTH CARE EDUCATION/TRAINING PROGRAM

## 2025-06-27 PROCEDURE — 2580000003 HC RX 258: Performed by: ANESTHESIOLOGY

## 2025-06-27 PROCEDURE — 6360000002 HC RX W HCPCS

## 2025-06-27 PROCEDURE — 2500000003 HC RX 250 WO HCPCS: Performed by: NURSE ANESTHETIST, CERTIFIED REGISTERED

## 2025-06-27 PROCEDURE — 6360000002 HC RX W HCPCS: Performed by: NURSE ANESTHETIST, CERTIFIED REGISTERED

## 2025-06-27 PROCEDURE — 6360000002 HC RX W HCPCS: Performed by: CHIROPRACTOR

## 2025-06-27 PROCEDURE — 2709999900 HC NON-CHARGEABLE SUPPLY: Performed by: STUDENT IN AN ORGANIZED HEALTH CARE EDUCATION/TRAINING PROGRAM

## 2025-06-27 PROCEDURE — 3600000005 HC SURGERY LEVEL 5 BASE: Performed by: STUDENT IN AN ORGANIZED HEALTH CARE EDUCATION/TRAINING PROGRAM

## 2025-06-27 PROCEDURE — 6360000002 HC RX W HCPCS: Performed by: STUDENT IN AN ORGANIZED HEALTH CARE EDUCATION/TRAINING PROGRAM

## 2025-06-27 PROCEDURE — 7100000000 HC PACU RECOVERY - FIRST 15 MIN: Performed by: STUDENT IN AN ORGANIZED HEALTH CARE EDUCATION/TRAINING PROGRAM

## 2025-06-27 PROCEDURE — 0SG30A0 FUSION OF LUMBOSACRAL JOINT WITH INTERBODY FUSION DEVICE, ANTERIOR APPROACH, ANTERIOR COLUMN, OPEN APPROACH: ICD-10-PCS | Performed by: STUDENT IN AN ORGANIZED HEALTH CARE EDUCATION/TRAINING PROGRAM

## 2025-06-27 PROCEDURE — 86920 COMPATIBILITY TEST SPIN: CPT

## 2025-06-27 PROCEDURE — 3600000015 HC SURGERY LEVEL 5 ADDTL 15MIN: Performed by: STUDENT IN AN ORGANIZED HEALTH CARE EDUCATION/TRAINING PROGRAM

## 2025-06-27 PROCEDURE — 2500000003 HC RX 250 WO HCPCS: Performed by: SURGERY

## 2025-06-27 PROCEDURE — 0ST40ZZ RESECTION OF LUMBOSACRAL DISC, OPEN APPROACH: ICD-10-PCS | Performed by: STUDENT IN AN ORGANIZED HEALTH CARE EDUCATION/TRAINING PROGRAM

## 2025-06-27 PROCEDURE — 8E0W0CZ ROBOTIC ASSISTED PROCEDURE OF TRUNK REGION, OPEN APPROACH: ICD-10-PCS | Performed by: STUDENT IN AN ORGANIZED HEALTH CARE EDUCATION/TRAINING PROGRAM

## 2025-06-27 PROCEDURE — 1200000000 HC SEMI PRIVATE

## 2025-06-27 PROCEDURE — 3700000000 HC ANESTHESIA ATTENDED CARE: Performed by: STUDENT IN AN ORGANIZED HEALTH CARE EDUCATION/TRAINING PROGRAM

## 2025-06-27 PROCEDURE — C1763 CONN TISS, NON-HUMAN: HCPCS | Performed by: STUDENT IN AN ORGANIZED HEALTH CARE EDUCATION/TRAINING PROGRAM

## 2025-06-27 PROCEDURE — 86870 RBC ANTIBODY IDENTIFICATION: CPT

## 2025-06-27 PROCEDURE — 6360000002 HC RX W HCPCS: Performed by: SURGERY

## 2025-06-27 PROCEDURE — 86901 BLOOD TYPING SEROLOGIC RH(D): CPT

## 2025-06-27 PROCEDURE — 2720000010 HC SURG SUPPLY STERILE: Performed by: STUDENT IN AN ORGANIZED HEALTH CARE EDUCATION/TRAINING PROGRAM

## 2025-06-27 PROCEDURE — 7100000001 HC PACU RECOVERY - ADDTL 15 MIN: Performed by: STUDENT IN AN ORGANIZED HEALTH CARE EDUCATION/TRAINING PROGRAM

## 2025-06-27 PROCEDURE — 86850 RBC ANTIBODY SCREEN: CPT

## 2025-06-27 PROCEDURE — 6370000000 HC RX 637 (ALT 250 FOR IP): Performed by: STUDENT IN AN ORGANIZED HEALTH CARE EDUCATION/TRAINING PROGRAM

## 2025-06-27 PROCEDURE — P9041 ALBUMIN (HUMAN),5%, 50ML: HCPCS | Performed by: NURSE ANESTHETIST, CERTIFIED REGISTERED

## 2025-06-27 PROCEDURE — 00QT0ZZ REPAIR SPINAL MENINGES, OPEN APPROACH: ICD-10-PCS | Performed by: STUDENT IN AN ORGANIZED HEALTH CARE EDUCATION/TRAINING PROGRAM

## 2025-06-27 PROCEDURE — 2580000003 HC RX 258: Performed by: CHIROPRACTOR

## 2025-06-27 PROCEDURE — 86900 BLOOD TYPING SEROLOGIC ABO: CPT

## 2025-06-27 PROCEDURE — 36415 COLL VENOUS BLD VENIPUNCTURE: CPT

## 2025-06-27 DEVICE — DURASEAL® EXACT SPINAL SEALANT SYSTEM 5ML 5 PACK
Type: IMPLANTABLE DEVICE | Site: SPINE LUMBAR | Status: FUNCTIONAL
Brand: DURASEAL EXACT SPINAL SEALANT SYSTEM

## 2025-06-27 DEVICE — BONE GRAFT KIT 7510400 INFUSE MEDIUM
Type: IMPLANTABLE DEVICE | Site: SPINE LUMBAR | Status: FUNCTIONAL
Brand: INFUSE® BONE GRAFT

## 2025-06-27 DEVICE — CLIP INT L ORNG TI TRNSVRS GRV CHEVRON SHP W/ PRECIS TIP TO: Type: IMPLANTABLE DEVICE | Status: FUNCTIONAL

## 2025-06-27 DEVICE — DBM T43105 5CC GRAFTON PUTTY
Type: IMPLANTABLE DEVICE | Site: SPINE LUMBAR | Status: FUNCTIONAL
Brand: GRAFTON®AND GRAFTON PLUS®DEMINERALIZED BONE MATRIX (DBM)

## 2025-06-27 DEVICE — DURAGEN® PLUS DURAL REGENERATION MATRIX, 1 IN X 1 IN (2.5 CM X 2.5 CM)
Type: IMPLANTABLE DEVICE | Status: FUNCTIONAL
Brand: DURAGEN® PLUS

## 2025-06-27 DEVICE — SCREW 55840007545 5.5/6 MAS 7.5X45 CC
Type: IMPLANTABLE DEVICE | Status: FUNCTIONAL
Brand: CD HORIZON® SPINAL SYSTEM

## 2025-06-27 DEVICE — CLIP LIG M BLU TI HRT SHP WIRE HORZ 6 CLIPS PER PK: Type: IMPLANTABLE DEVICE | Status: FUNCTIONAL

## 2025-06-27 DEVICE — BONE SCREW 4675025 LS 5.0X25MMT
Type: IMPLANTABLE DEVICE | Status: FUNCTIONAL
Brand: ANTERALIGN SPINAL SYSTEM WITH TITAN NANOLOCK SURFACE TECHNOLOGY

## 2025-06-27 DEVICE — ROD 1555501060 5.5 CCM NS CURV 60MM
Type: IMPLANTABLE DEVICE | Status: FUNCTIONAL
Brand: CD HORIZON® SPINAL SYSTEM

## 2025-06-27 DEVICE — SCREW 55840006545 5.5/6 MAS 6.5X45 CC .
Type: IMPLANTABLE DEVICE | Status: FUNCTIONAL
Brand: CD HORIZON® SPINAL SYSTEM

## 2025-06-27 DEVICE — SET SCREW 5540030 5.5 TI NS BRK OFF
Type: IMPLANTABLE DEVICE | Status: FUNCTIONAL
Brand: CD HORIZON® SPINAL SYSTEM

## 2025-06-27 DEVICE — IMPLANTABLE DEVICE: Type: IMPLANTABLE DEVICE | Status: FUNCTIONAL

## 2025-06-27 DEVICE — CLIP INT M L GRN TI TRNSVRS GRV CHEVRON SHP W/ PRECIS TIP: Type: IMPLANTABLE DEVICE | Status: FUNCTIONAL

## 2025-06-27 DEVICE — CLIP INT SM WIDE WECK RED TI TRNSVRS GRV CHEVRON SHP W/ PERCIS TIP 24 PER PK: Type: IMPLANTABLE DEVICE | Status: FUNCTIONAL

## 2025-06-27 RX ORDER — OXYCODONE HYDROCHLORIDE 5 MG/1
5 TABLET ORAL EVERY 4 HOURS PRN
Status: DISCONTINUED | OUTPATIENT
Start: 2025-06-27 | End: 2025-07-01 | Stop reason: HOSPADM

## 2025-06-27 RX ORDER — MAGNESIUM HYDROXIDE 1200 MG/15ML
LIQUID ORAL CONTINUOUS PRN
Status: COMPLETED | OUTPATIENT
Start: 2025-06-27 | End: 2025-06-27

## 2025-06-27 RX ORDER — POLYETHYLENE GLYCOL 3350 17 G/17G
17 POWDER, FOR SOLUTION ORAL DAILY PRN
Status: DISCONTINUED | OUTPATIENT
Start: 2025-06-27 | End: 2025-07-01 | Stop reason: HOSPADM

## 2025-06-27 RX ORDER — ENOXAPARIN SODIUM 100 MG/ML
40 INJECTION SUBCUTANEOUS DAILY
Status: DISCONTINUED | OUTPATIENT
Start: 2025-06-30 | End: 2025-07-01 | Stop reason: HOSPADM

## 2025-06-27 RX ORDER — SODIUM CHLORIDE, SODIUM LACTATE, POTASSIUM CHLORIDE, CALCIUM CHLORIDE 600; 310; 30; 20 MG/100ML; MG/100ML; MG/100ML; MG/100ML
INJECTION, SOLUTION INTRAVENOUS CONTINUOUS
Status: DISCONTINUED | OUTPATIENT
Start: 2025-06-27 | End: 2025-07-01 | Stop reason: HOSPADM

## 2025-06-27 RX ORDER — SODIUM CHLORIDE 9 MG/ML
INJECTION, SOLUTION INTRAVENOUS PRN
Status: DISCONTINUED | OUTPATIENT
Start: 2025-06-27 | End: 2025-07-01 | Stop reason: HOSPADM

## 2025-06-27 RX ORDER — ALBUMIN HUMAN 50 G/1000ML
SOLUTION INTRAVENOUS
Status: DISCONTINUED | OUTPATIENT
Start: 2025-06-27 | End: 2025-06-27 | Stop reason: SDUPTHER

## 2025-06-27 RX ORDER — SODIUM CHLORIDE 9 MG/ML
INJECTION, SOLUTION INTRAVENOUS PRN
Status: DISCONTINUED | OUTPATIENT
Start: 2025-06-27 | End: 2025-06-27 | Stop reason: HOSPADM

## 2025-06-27 RX ORDER — IBUPROFEN 800 MG/1
400 TABLET, FILM COATED ORAL EVERY 12 HOURS PRN
Status: DISCONTINUED | OUTPATIENT
Start: 2025-06-27 | End: 2025-07-01 | Stop reason: HOSPADM

## 2025-06-27 RX ORDER — SODIUM CHLORIDE 0.9 % (FLUSH) 0.9 %
10 SYRINGE (ML) INJECTION PRN
Status: DISCONTINUED | OUTPATIENT
Start: 2025-06-27 | End: 2025-07-01 | Stop reason: HOSPADM

## 2025-06-27 RX ORDER — LABETALOL HYDROCHLORIDE 5 MG/ML
10 INJECTION, SOLUTION INTRAVENOUS
Status: DISCONTINUED | OUTPATIENT
Start: 2025-06-27 | End: 2025-06-27 | Stop reason: HOSPADM

## 2025-06-27 RX ORDER — ONDANSETRON 2 MG/ML
INJECTION INTRAMUSCULAR; INTRAVENOUS
Status: DISCONTINUED | OUTPATIENT
Start: 2025-06-27 | End: 2025-06-27 | Stop reason: SDUPTHER

## 2025-06-27 RX ORDER — BUPIVACAINE HYDROCHLORIDE AND EPINEPHRINE 5; 5 MG/ML; UG/ML
INJECTION, SOLUTION EPIDURAL; INTRACAUDAL; PERINEURAL PRN
Status: DISCONTINUED | OUTPATIENT
Start: 2025-06-27 | End: 2025-06-27 | Stop reason: ALTCHOICE

## 2025-06-27 RX ORDER — ACETAMINOPHEN 325 MG/1
650 TABLET ORAL EVERY 6 HOURS
Status: DISCONTINUED | OUTPATIENT
Start: 2025-06-27 | End: 2025-07-01 | Stop reason: HOSPADM

## 2025-06-27 RX ORDER — POLYETHYLENE GLYCOL 3350 17 G/17G
17 POWDER, FOR SOLUTION ORAL 2 TIMES DAILY
Status: DISCONTINUED | OUTPATIENT
Start: 2025-06-27 | End: 2025-07-01 | Stop reason: HOSPADM

## 2025-06-27 RX ORDER — ROCURONIUM BROMIDE 10 MG/ML
INJECTION, SOLUTION INTRAVENOUS
Status: DISCONTINUED | OUTPATIENT
Start: 2025-06-27 | End: 2025-06-27 | Stop reason: SDUPTHER

## 2025-06-27 RX ORDER — NALOXONE HYDROCHLORIDE 0.4 MG/ML
INJECTION, SOLUTION INTRAMUSCULAR; INTRAVENOUS; SUBCUTANEOUS PRN
Status: DISCONTINUED | OUTPATIENT
Start: 2025-06-27 | End: 2025-06-27 | Stop reason: HOSPADM

## 2025-06-27 RX ORDER — ULTRASOUND COUPLING MEDIUM
GEL (GRAM) TOPICAL PRN
Status: DISCONTINUED | OUTPATIENT
Start: 2025-06-27 | End: 2025-06-27 | Stop reason: ALTCHOICE

## 2025-06-27 RX ORDER — TRANEXAMIC ACID 10 MG/ML
INJECTION, SOLUTION INTRAVENOUS
Status: DISCONTINUED | OUTPATIENT
Start: 2025-06-27 | End: 2025-06-27 | Stop reason: SDUPTHER

## 2025-06-27 RX ORDER — ONDANSETRON 2 MG/ML
4 INJECTION INTRAMUSCULAR; INTRAVENOUS EVERY 6 HOURS PRN
Status: DISCONTINUED | OUTPATIENT
Start: 2025-06-27 | End: 2025-07-01 | Stop reason: HOSPADM

## 2025-06-27 RX ORDER — DEXAMETHASONE SODIUM PHOSPHATE 10 MG/ML
INJECTION, SOLUTION INTRA-ARTICULAR; INTRALESIONAL; INTRAMUSCULAR; INTRAVENOUS; SOFT TISSUE
Status: DISCONTINUED | OUTPATIENT
Start: 2025-06-27 | End: 2025-06-27 | Stop reason: SDUPTHER

## 2025-06-27 RX ORDER — TIZANIDINE 2 MG/1
2 TABLET ORAL EVERY 8 HOURS PRN
Qty: 30 TABLET | Refills: 0 | Status: SHIPPED | OUTPATIENT
Start: 2025-06-27

## 2025-06-27 RX ORDER — SODIUM CHLORIDE, SODIUM LACTATE, POTASSIUM CHLORIDE, CALCIUM CHLORIDE 600; 310; 30; 20 MG/100ML; MG/100ML; MG/100ML; MG/100ML
INJECTION, SOLUTION INTRAVENOUS
Status: DISCONTINUED | OUTPATIENT
Start: 2025-06-27 | End: 2025-06-27 | Stop reason: SDUPTHER

## 2025-06-27 RX ORDER — HYDRALAZINE HYDROCHLORIDE 20 MG/ML
10 INJECTION INTRAMUSCULAR; INTRAVENOUS
Status: DISCONTINUED | OUTPATIENT
Start: 2025-06-27 | End: 2025-06-27 | Stop reason: HOSPADM

## 2025-06-27 RX ORDER — LIDOCAINE HYDROCHLORIDE 10 MG/ML
INJECTION, SOLUTION EPIDURAL; INFILTRATION; INTRACAUDAL; PERINEURAL
Status: DISCONTINUED | OUTPATIENT
Start: 2025-06-27 | End: 2025-06-27 | Stop reason: SDUPTHER

## 2025-06-27 RX ORDER — SODIUM CHLORIDE, SODIUM LACTATE, POTASSIUM CHLORIDE, CALCIUM CHLORIDE 600; 310; 30; 20 MG/100ML; MG/100ML; MG/100ML; MG/100ML
INJECTION, SOLUTION INTRAVENOUS CONTINUOUS
Status: DISCONTINUED | OUTPATIENT
Start: 2025-06-27 | End: 2025-06-27 | Stop reason: HOSPADM

## 2025-06-27 RX ORDER — ONDANSETRON 4 MG/1
4 TABLET, ORALLY DISINTEGRATING ORAL 3 TIMES DAILY PRN
Qty: 21 TABLET | Refills: 0 | Status: SHIPPED | OUTPATIENT
Start: 2025-06-27

## 2025-06-27 RX ORDER — SODIUM CHLORIDE 0.9 % (FLUSH) 0.9 %
5-40 SYRINGE (ML) INJECTION PRN
Status: DISCONTINUED | OUTPATIENT
Start: 2025-06-27 | End: 2025-06-27 | Stop reason: HOSPADM

## 2025-06-27 RX ORDER — OXYCODONE AND ACETAMINOPHEN 5; 325 MG/1; MG/1
1 TABLET ORAL EVERY 6 HOURS PRN
Qty: 30 TABLET | Refills: 0 | Status: CANCELLED | OUTPATIENT
Start: 2025-06-27 | End: 2025-07-04

## 2025-06-27 RX ORDER — ONDANSETRON 2 MG/ML
4 INJECTION INTRAMUSCULAR; INTRAVENOUS
Status: DISCONTINUED | OUTPATIENT
Start: 2025-06-27 | End: 2025-06-27 | Stop reason: HOSPADM

## 2025-06-27 RX ORDER — MIDAZOLAM HYDROCHLORIDE 1 MG/ML
INJECTION, SOLUTION INTRAMUSCULAR; INTRAVENOUS
Status: DISCONTINUED | OUTPATIENT
Start: 2025-06-27 | End: 2025-06-27 | Stop reason: SDUPTHER

## 2025-06-27 RX ORDER — ONDANSETRON 4 MG/1
4 TABLET, ORALLY DISINTEGRATING ORAL EVERY 8 HOURS PRN
Status: DISCONTINUED | OUTPATIENT
Start: 2025-06-27 | End: 2025-07-01 | Stop reason: HOSPADM

## 2025-06-27 RX ORDER — PROPOFOL 10 MG/ML
INJECTION, EMULSION INTRAVENOUS
Status: DISCONTINUED | OUTPATIENT
Start: 2025-06-27 | End: 2025-06-27 | Stop reason: SDUPTHER

## 2025-06-27 RX ORDER — SODIUM CHLORIDE 0.9 % (FLUSH) 0.9 %
5-40 SYRINGE (ML) INJECTION EVERY 12 HOURS SCHEDULED
Status: DISCONTINUED | OUTPATIENT
Start: 2025-06-27 | End: 2025-06-27 | Stop reason: HOSPADM

## 2025-06-27 RX ORDER — HEPARIN SODIUM 10000 [USP'U]/ML
INJECTION, SOLUTION INTRAVENOUS; SUBCUTANEOUS PRN
Status: DISCONTINUED | OUTPATIENT
Start: 2025-06-27 | End: 2025-06-27 | Stop reason: ALTCHOICE

## 2025-06-27 RX ORDER — SODIUM CHLORIDE 0.9 % (FLUSH) 0.9 %
10 SYRINGE (ML) INJECTION EVERY 12 HOURS SCHEDULED
Status: DISCONTINUED | OUTPATIENT
Start: 2025-06-27 | End: 2025-07-01 | Stop reason: HOSPADM

## 2025-06-27 RX ORDER — HYDROCODONE BITARTRATE AND ACETAMINOPHEN 5; 325 MG/1; MG/1
1 TABLET ORAL EVERY 6 HOURS PRN
Qty: 30 TABLET | Refills: 0 | Status: SHIPPED | OUTPATIENT
Start: 2025-06-27 | End: 2025-07-04

## 2025-06-27 RX ORDER — CALCIUM CHLORIDE 100 MG/ML
INJECTION INTRAVENOUS; INTRAVENTRICULAR
Status: DISCONTINUED | OUTPATIENT
Start: 2025-06-27 | End: 2025-06-27 | Stop reason: SDUPTHER

## 2025-06-27 RX ORDER — FENTANYL CITRATE 50 UG/ML
INJECTION, SOLUTION INTRAMUSCULAR; INTRAVENOUS
Status: DISCONTINUED | OUTPATIENT
Start: 2025-06-27 | End: 2025-06-27 | Stop reason: SDUPTHER

## 2025-06-27 RX ORDER — OXYCODONE HYDROCHLORIDE 5 MG/1
10 TABLET ORAL EVERY 4 HOURS PRN
Status: DISCONTINUED | OUTPATIENT
Start: 2025-06-27 | End: 2025-07-01 | Stop reason: HOSPADM

## 2025-06-27 RX ADMIN — FENTANYL CITRATE 50 MCG: 0.05 INJECTION, SOLUTION INTRAMUSCULAR; INTRAVENOUS at 12:45

## 2025-06-27 RX ADMIN — HYDROMORPHONE HYDROCHLORIDE 0.5 MG: 1 INJECTION, SOLUTION INTRAMUSCULAR; INTRAVENOUS; SUBCUTANEOUS at 20:27

## 2025-06-27 RX ADMIN — ROCURONIUM BROMIDE 25 MG: 10 INJECTION, SOLUTION INTRAVENOUS at 17:34

## 2025-06-27 RX ADMIN — LIDOCAINE HYDROCHLORIDE 50 MG: 10 INJECTION, SOLUTION EPIDURAL; INFILTRATION; INTRACAUDAL; PERINEURAL at 12:50

## 2025-06-27 RX ADMIN — CALCIUM CHLORIDE 1 G: 100 INJECTION INTRAVENOUS; INTRAVENTRICULAR at 13:20

## 2025-06-27 RX ADMIN — FENTANYL CITRATE 50 MCG: 0.05 INJECTION, SOLUTION INTRAMUSCULAR; INTRAVENOUS at 17:37

## 2025-06-27 RX ADMIN — FENTANYL CITRATE 50 MCG: 0.05 INJECTION, SOLUTION INTRAMUSCULAR; INTRAVENOUS at 13:18

## 2025-06-27 RX ADMIN — FENTANYL CITRATE 50 MCG: 0.05 INJECTION, SOLUTION INTRAMUSCULAR; INTRAVENOUS at 13:37

## 2025-06-27 RX ADMIN — FENTANYL CITRATE 100 MCG: 0.05 INJECTION, SOLUTION INTRAMUSCULAR; INTRAVENOUS at 13:25

## 2025-06-27 RX ADMIN — ROCURONIUM BROMIDE 30 MG: 10 INJECTION, SOLUTION INTRAVENOUS at 13:56

## 2025-06-27 RX ADMIN — Medication 2000 MG: at 13:15

## 2025-06-27 RX ADMIN — DEXAMETHASONE SODIUM PHOSPHATE 10 MG: 10 INJECTION INTRAMUSCULAR; INTRAVENOUS at 13:06

## 2025-06-27 RX ADMIN — PHENYLEPHRINE HYDROCHLORIDE 100 MCG: 10 INJECTION INTRAVENOUS at 13:41

## 2025-06-27 RX ADMIN — PHENYLEPHRINE HYDROCHLORIDE 100 MCG: 10 INJECTION INTRAVENOUS at 14:01

## 2025-06-27 RX ADMIN — FENTANYL CITRATE 50 MCG: 0.05 INJECTION, SOLUTION INTRAMUSCULAR; INTRAVENOUS at 14:31

## 2025-06-27 RX ADMIN — SODIUM CHLORIDE, SODIUM LACTATE, POTASSIUM CHLORIDE, AND CALCIUM CHLORIDE: .6; .31; .03; .02 INJECTION, SOLUTION INTRAVENOUS at 21:17

## 2025-06-27 RX ADMIN — TRANEXAMIC ACID 1 G: 10 INJECTION, SOLUTION INTRAVENOUS at 15:35

## 2025-06-27 RX ADMIN — ROCURONIUM BROMIDE 20 MG: 10 INJECTION, SOLUTION INTRAVENOUS at 14:29

## 2025-06-27 RX ADMIN — Medication 2000 MG: at 17:15

## 2025-06-27 RX ADMIN — ROCURONIUM BROMIDE 30 MG: 10 INJECTION, SOLUTION INTRAVENOUS at 15:18

## 2025-06-27 RX ADMIN — ALBUMIN (HUMAN) 500 ML: 12.5 INJECTION, SOLUTION INTRAVENOUS at 13:20

## 2025-06-27 RX ADMIN — PHENYLEPHRINE HYDROCHLORIDE 100 MCG: 10 INJECTION INTRAVENOUS at 13:54

## 2025-06-27 RX ADMIN — ROCURONIUM BROMIDE 50 MG: 10 INJECTION, SOLUTION INTRAVENOUS at 12:51

## 2025-06-27 RX ADMIN — PROPOFOL 130 MG: 10 INJECTION, EMULSION INTRAVENOUS at 12:50

## 2025-06-27 RX ADMIN — SUGAMMADEX 400 MG: 100 INJECTION, SOLUTION INTRAVENOUS at 19:51

## 2025-06-27 RX ADMIN — POLYETHYLENE GLYCOL 3350 17 G: 17 POWDER, FOR SOLUTION ORAL at 21:23

## 2025-06-27 RX ADMIN — ACETAMINOPHEN 650 MG: 325 TABLET ORAL at 21:22

## 2025-06-27 RX ADMIN — ROCURONIUM BROMIDE 20 MG: 10 INJECTION, SOLUTION INTRAVENOUS at 13:17

## 2025-06-27 RX ADMIN — MIDAZOLAM 2 MG: 1 INJECTION INTRAMUSCULAR; INTRAVENOUS at 12:42

## 2025-06-27 RX ADMIN — ONDANSETRON 4 MG: 2 INJECTION, SOLUTION INTRAMUSCULAR; INTRAVENOUS at 19:19

## 2025-06-27 RX ADMIN — SODIUM CHLORIDE, SODIUM LACTATE, POTASSIUM CHLORIDE, AND CALCIUM CHLORIDE: .6; .31; .03; .02 INJECTION, SOLUTION INTRAVENOUS at 10:41

## 2025-06-27 RX ADMIN — SODIUM CHLORIDE, POTASSIUM CHLORIDE, SODIUM LACTATE AND CALCIUM CHLORIDE: 600; 310; 30; 20 INJECTION, SOLUTION INTRAVENOUS at 12:43

## 2025-06-27 RX ADMIN — FENTANYL CITRATE 50 MCG: 0.05 INJECTION, SOLUTION INTRAMUSCULAR; INTRAVENOUS at 16:31

## 2025-06-27 RX ADMIN — FENTANYL CITRATE 50 MCG: 0.05 INJECTION, SOLUTION INTRAMUSCULAR; INTRAVENOUS at 12:48

## 2025-06-27 RX ADMIN — ROCURONIUM BROMIDE 25 MG: 10 INJECTION, SOLUTION INTRAVENOUS at 16:04

## 2025-06-27 RX ADMIN — FENTANYL CITRATE 50 MCG: 0.05 INJECTION, SOLUTION INTRAMUSCULAR; INTRAVENOUS at 13:33

## 2025-06-27 RX ADMIN — FENTANYL CITRATE 50 MCG: 0.05 INJECTION, SOLUTION INTRAMUSCULAR; INTRAVENOUS at 18:17

## 2025-06-27 RX ADMIN — FENTANYL CITRATE 50 MCG: 0.05 INJECTION, SOLUTION INTRAMUSCULAR; INTRAVENOUS at 15:48

## 2025-06-27 RX ADMIN — PHENYLEPHRINE HYDROCHLORIDE 100 MCG: 10 INJECTION INTRAVENOUS at 17:43

## 2025-06-27 ASSESSMENT — PAIN - FUNCTIONAL ASSESSMENT
PAIN_FUNCTIONAL_ASSESSMENT: NONE - DENIES PAIN
PAIN_FUNCTIONAL_ASSESSMENT: NONE - DENIES PAIN

## 2025-06-27 ASSESSMENT — PAIN DESCRIPTION - DESCRIPTORS
DESCRIPTORS: BURNING
DESCRIPTORS: ACHING
DESCRIPTORS: ACHING

## 2025-06-27 ASSESSMENT — PAIN DESCRIPTION - ORIENTATION: ORIENTATION: MID

## 2025-06-27 ASSESSMENT — PAIN DESCRIPTION - LOCATION
LOCATION: ABDOMEN
LOCATION: ABDOMEN
LOCATION: ABDOMEN;BACK

## 2025-06-27 ASSESSMENT — PAIN SCALES - GENERAL
PAINLEVEL_OUTOF10: 5

## 2025-06-27 NOTE — OP NOTE
Operative Note      Patient: Terra Hills  YOB: 1950  MRN: [id number]    Date of Procedure: 6/27/2025    Pre-Op Diagnosis Codes:      * History of lumbar fusion [Z98.1]     * Mechanical breakdown of internal fixation device of vertebrae, initial encounter [T84.216A]    Post-Op Diagnosis: Same       Procedure(s):  ANTERIOR LUMBAR INTERBODY FUSION L5-S1, REVISION POSTERIOR INSTRUMENTATION L5 WITH EXTENSION TO PELVIS  (MEDTRONIC, ChannelAdvisorOR ROBOT, STEALTH NAVIGATION, C-ARM, O-ARM, SAIRA SPINE TABLE, *CELL-SAVER*)  ANTERIOR APPROACH  Anterior retroperitoneal exposure of L5-S1    Surgeon(s):  Isai Greer DO Aplin, Brett, MD    Assistant:   Resident: Kirill Alonzo DO    Anesthesia: General    Estimated Blood Loss (mL): 200     Complications: None    Specimens:   * No specimens in log *    Implants:  Implant Name Type Inv. Item Serial No.  Lot No. LRB No. Used Action   CLIP INT L ORNG TI TRNSVRS GRV CHEVRON SHP W/ PRECIS TIP TO - [id number]  CLIP INT L ORNG TI TRNSVRS GRV CHEVRON SHP W/ PRECIS TIP TO  [employer]-WD  N/A 1 Implanted   CLIP INT M L GRN TI TRNSVRS GRV CHEVRON SHP W/ PRECIS TIP - [id number]  CLIP INT M L GRN TI TRNSVRS GRV CHEVRON SHP W/ PRECIS TIP  [employer]-WD  N/A 1 Implanted   CLIP INT WECK SM WIDE RED TI TRNSVRS GRV CHEVRON SHP W/ PERCIS TIP 6 PER PK - [id number]  CLIP INT WECK SM WIDE RED TI TRNSVRS GRV CHEVRON SHP W/ PERCIS TIP 6 PER PK  [employer]-WD  N/A 1 Implanted   CLIP LIG M EZRA TI HRT SHP WIRE HORZ 6 CLIPS PER PK - [id number]  CLIP LIG M EZRA TI HRT SHP WIRE HORZ 6 CLIPS PER PK  TE[employer]-WD  N/A 1 Implanted   CLIP LIG M EZRA TI HRT SHP WIRE HORZ 6 CLIPS PER PK - [id number]  CLIP LIG M EZRA TI HRT SHP WIRE HORZ 6 CLIPS PER PK  TE[employer] MED[employer]-WD  N/A 3 Implanted   CLIP INT L ORNG TI TRNSVRS GRV CHEVRON SHP W/ PRECIS TIP TO - [id number]  CLIP INT L ORNG TI TRNSVRS GRV CHEVRON SHP W/ PRECIS TIP TO  [facility]  N/A 1  Implanted   CLIP INT SM WIDE WECK RED TI TRNSVRS GRV CHEVRON SHP W/ PERCIS TIP 24 PER PK - [id number]  CLIP INT SM WIDE WECK RED TI TRNSVRS GRV CHEVRON SHP W/ PERCIS TIP 24 PER PK  TE[employer] MED[employer]-WD  N/A 1 Implanted   CLIP INT M L GRN TI TRNSVRS GRV CHEVRON SHP W/ PRECIS TIP - [id number]  CLIP INT M L GRN TI TRNSVRS GRV CHEVRON SHP W/ PRECIS TIP  TE[employer] MED[employer]-WD  N/A 1 Implanted   SCREW SPNL L 25 MM ALVINO 5 MM LUMBAR TITAN NANOLOCK SURF - [id number]  SCREW SPNL L 25 MM ALVINO 5 MM LUMBAR TITAN NANOLOCK SURF  MEDTRONIC SOFAMOR DANEK-WD [id number] N/A 3 Implanted   SPACER SPNL 12 DEG 37 X 29 X 12 MM MED TI TITAN NANOLOCK BLESSING - [id number]  SPACER SPNL 12 DEG 37 X 29 X 12 MM MED TI TITAN NANOLOCK BLESSING  MEDTRONIC SOFAMOR DANEK-WD [id number] N/A 1 Implanted         Drains:   Urinary Catheter 06/27/25 King (Active)       Findings:  Infection Present At Time Of Surgery (PATOS) (choose all levels that have infection present):  No infection present  Other Findings: none    Detailed Description of Procedure:   The patient was brought to the operating room and placed in a supine position.  After adequate anesthesia and time out, the patient was positioned, prepped, and draped in the usual sterile fashion.   A longitudinal incision was made in the lower abdomen below the umbilicus to the pubis symphysis.  Dissection was carried out through the subcutaneous tissue using electrocautery.  The anterior rectus fascia was cleared and incised longitudinally.  The medial border of the muscle was identified and skeletonized.  The muscle was retracted laterally and a retroperitoneal plane was bluntly developed in the left lower quadrant.  The peritoneum and its contents were retracted across the midline toward the patient's right.  A Bookwalter retractor was inserted and three-point retraction was used throughout the procedure.  Bovie cautery was used to control any small bleeding points.  The vessels were sharply dissected and

## 2025-06-27 NOTE — INTERVAL H&P NOTE
Pt Name: Terra Hills  MRN: [id number]  YOB: 1950  Date of evaluation: 6/27/2025    I have reviewed the patient's history and physical examination completed in pre-admission testing on 6/13/2025.    No changes to history or on examination today, unless noted below.   None.     REBECCA Whitt - CNP  6/27/25  10:24 AM

## 2025-06-27 NOTE — BRIEF OP NOTE
Brief Postoperative Note      Patient: Terra Hills  YOB: 1950  MRN: [id number]    Date of Procedure: 6/27/2025    Pre-Op Diagnosis Codes:      * History of lumbar fusion [Z98.1]     * Mechanical breakdown of internal fixation device of vertebrae, initial encounter [T84.216A]    Post-Op Diagnosis: Same       Procedure(s):  ANTERIOR LUMBAR INTERBODY FUSION L5-S1  ANTERIOR APPROACH  REVISION POSTERIOR INSTRUMENTATION L5-S1    Surgeon(s):  James Farris MD Blankenship, Andrew Z, DO Blankenship, Andrew Z, DO    Assistant:  Resident: Kirill Alonzo DO    Anesthesia: General    Estimated Blood Loss (mL): 300     Complications: None    Specimens:   * No specimens in log *    Implants:  Implant Name Type Inv. Item Serial No.  Lot No. LRB No. Used Action   CLIP INT L ORNG TI TRNSVRS GRV CHEVRON SHP W/ PRECIS TIP TO - [id number]  CLIP INT L ORNG TI TRNSVRS GRV CHEVRON SHP W/ PRECIS TIP TO  [employer]-WD  N/A 1 Implanted   CLIP INT M L GRN TI TRNSVRS GRV CHEVRON SHP W/ PRECIS TIP - [id number]  CLIP INT M L GRN TI TRNSVRS GRV CHEVRON SHP W/ PRECIS TIP  [employer]-WD  N/A 1 Implanted   CLIP INT WECK SM WIDE RED TI TRNSVRS GRV CHEVRON SHP W/ PERCIS TIP 6 PER PK - [id number]  CLIP INT WECK SM WIDE RED TI TRNSVRS GRV CHEVRON SHP W/ PERCIS TIP 6 PER PK  [employer]-WD  N/A 1 Implanted   CLIP LIG M EZRA TI HRT SHP WIRE HORZ 6 CLIPS PER PK - [id number]  CLIP LIG M EZRA TI HRT SHP WIRE HORZ 6 CLIPS PER PK  [employer]-WD  N/A 1 Implanted   CLIP LIG M EZRA TI HRT SHP WIRE HORZ 6 CLIPS PER PK - [id number]  CLIP LIG M EZRA TI HRT SHP WIRE HORZ 6 CLIPS PER PK  [employer]-WD  N/A 3 Implanted   CLIP INT L ORNG TI TRNSVRS GRV CHEVRON SHP W/ PRECIS TIP TO - [id number]  CLIP INT L ORNG TI TRNSVRS GRV CHEVRON SHP W/ PRECIS TIP TO  [employer]-WD  N/A 1 Implanted   CLIP INT SM WIDE WECK RED TI TRNSVRS GRV CHEVRON SHP W/ PERCIS TIP 24 PER PK - [id number]  CLIP INT SM WIDE WECK RED

## 2025-06-27 NOTE — DISCHARGE INSTRUCTIONS
Orthopedic Spine Discharge Instructions:  -Mobilize as tolerated. Avoid excessive Bending, Lifting, and Twisting (BLT).  -Maintain surgical dressing in place until follow-up if possible. If dressing comes off with activity and hygiene, may leave uncovered if no drainage. Otherwise may replace with simple gauze and tape dressing.  -Ice (20 minutes on and off 1 hour) as needed for swelling/pain.  -Drink plenty of fluids.  -Call the office or come to Emergency Room if signs of infection appear (hot, swollen, red, draining pus, fever).  -Take medications as prescribed.  -Wean off narcotics (Percocet/Norco) as soon as possible. Do not take Tylenol if still taking narcotics.  -No alcoholic beverages or driving/operating machinery while on narcotics  -Follow up with Dr. Greer in his office in 10-14 days after surgery/discharge. Call [phone] to schedule.

## 2025-06-27 NOTE — ANESTHESIA PRE PROCEDURE
Department of Anesthesiology  Preprocedure Note       Name:  Terra Hills   Age:  75 y.o.  :  1950                                          MRN:  [id number]         Date:  2025      Surgeon: Surgeon(s):  Isai Greer DO Aplin, Brett, MD    Procedure: Procedure(s):  ANTERIOR LUMBAR INTERBODY FUSION L5-S1, REVISION POSTERIOR INSTRUMENTATION L5 WITH EXTENSION TO PELVIS  (MEDTRONIC, MAZOR ROBOT, STEALTH NAVIGATION, C-ARM, O-ARM, SAIRA SPINE TABLE, *CELL-SAVER*)  ANTERIOR APPROACH    Medications prior to admission:   Prior to Admission medications    Medication Sig Start Date End Date Taking? Authorizing Provider   NONFORMULARY daily CBD tincture and balm stick    ProviderSherie MD   rosuvastatin (CRESTOR) 20 MG tablet TAKE 1 TABLET BY MOUTH AT  BEDTIME 25   Desirae Calhoun,    metoprolol tartrate (LOPRESSOR) 50 MG tablet TAKE 1 TABLET BY MOUTH DAILY 25   Desirae Calhoun DO   meloxicam (MOBIC) 15 MG tablet Take 1 tablet by mouth daily 25   Desirae Calhoun,    hydroCHLOROthiazide (HYDRODIURIL) 25 MG tablet Take 1 tablet by mouth every morning 3/11/25   Desirae Calhoun DO   Semaglutide, 1 MG/DOSE, 2 MG/1.5ML SOPN Compounded semaglutide 1.8mg/0.5mL:  Inject 0.5mL once weekly  Patient not taking: Reported on 2025 3/11/25   Desirae Calhoun DO   amLODIPine (NORVASC) 5 MG tablet TAKE 1 TABLET BY MOUTH TWICE  DAILY 25   Desirae Calhoun DO   lisinopril (PRINIVIL;ZESTRIL) 20 MG tablet TAKE 1 TABLET BY MOUTH DAILY 24   Desirae Calhoun DO   docusate sodium (COLACE) 100 MG capsule Take 1 capsule by mouth 2 times daily    ProviderSherie MD   Respiratory Therapy Supplies MISC Replacement Bipap Machine pressure settings 9 IPAP 5 EPAP,with heated humidification and  BIPAP supplies 24   Desirae Calhoun DO   Omega-3 Fatty Acids (FISH OIL PO) Take by mouth daily  Patient not taking: Reported on 2025    Provider, MD Sherie

## 2025-06-28 LAB
ABO/RH: NORMAL
ALBUMIN SERPL-MCNC: 3.5 G/DL (ref 3.5–5.2)
ALBUMIN/GLOB SERPL: 1.6 {RATIO} (ref 1–2.5)
ALP SERPL-CCNC: 86 U/L (ref 35–104)
ALT SERPL-CCNC: 12 U/L (ref 10–35)
ANION GAP SERPL CALCULATED.3IONS-SCNC: 10 MMOL/L (ref 9–16)
ANTIBODY IDENTIFICATION: NORMAL
ANTIBODY SCREEN: POSITIVE
ARM BAND NUMBER: NORMAL
AST SERPL-CCNC: 18 U/L (ref 10–35)
BASOPHILS # BLD: <0.03 K/UL (ref 0–0.2)
BASOPHILS NFR BLD: 0 % (ref 0–2)
BILIRUB SERPL-MCNC: 0.3 MG/DL (ref 0–1.2)
BLOOD BANK DISPENSE STATUS: NORMAL
BLOOD BANK DISPENSE STATUS: NORMAL
BLOOD BANK SAMPLE EXPIRATION: NORMAL
BPU ID: NORMAL
BPU ID: NORMAL
BUN SERPL-MCNC: 14 MG/DL (ref 8–23)
CALCIUM SERPL-MCNC: 8.6 MG/DL (ref 8.6–10.4)
CHLORIDE SERPL-SCNC: 101 MMOL/L (ref 98–107)
CO2 SERPL-SCNC: 27 MMOL/L (ref 20–31)
COMPONENT: NORMAL
COMPONENT: NORMAL
CREAT SERPL-MCNC: 0.8 MG/DL (ref 0.6–0.9)
CROSSMATCH RESULT: NORMAL
CROSSMATCH RESULT: NORMAL
EOSINOPHIL # BLD: <0.03 K/UL (ref 0–0.44)
EOSINOPHILS RELATIVE PERCENT: 0 % (ref 1–4)
ERYTHROCYTE [DISTWIDTH] IN BLOOD BY AUTOMATED COUNT: 13.6 % (ref 11.8–14.4)
GFR, ESTIMATED: 77 ML/MIN/1.73M2
GLUCOSE SERPL-MCNC: 171 MG/DL (ref 74–99)
HCT VFR BLD AUTO: 34.9 % (ref 36.3–47.1)
HGB BLD-MCNC: 11.4 G/DL (ref 11.9–15.1)
IMM GRANULOCYTES # BLD AUTO: <0.03 K/UL (ref 0–0.3)
IMM GRANULOCYTES NFR BLD: 0 %
LYMPHOCYTES NFR BLD: 0.72 K/UL (ref 1.1–3.7)
LYMPHOCYTES RELATIVE PERCENT: 14 % (ref 24–43)
MCH RBC QN AUTO: 29.7 PG (ref 25.2–33.5)
MCHC RBC AUTO-ENTMCNC: 32.7 G/DL (ref 28.4–34.8)
MCV RBC AUTO: 90.9 FL (ref 82.6–102.9)
MONOCYTES NFR BLD: 0.61 K/UL (ref 0.1–1.2)
MONOCYTES NFR BLD: 12 % (ref 3–12)
NEUTROPHILS NFR BLD: 74 % (ref 36–65)
NEUTS SEG NFR BLD: 3.67 K/UL (ref 1.5–8.1)
NRBC BLD-RTO: 0 PER 100 WBC
PLATELET # BLD AUTO: 103 K/UL (ref 138–453)
PMV BLD AUTO: 11.4 FL (ref 8.1–13.5)
POTASSIUM SERPL-SCNC: 4.2 MMOL/L (ref 3.7–5.3)
PROT SERPL-MCNC: 5.7 G/DL (ref 6.6–8.7)
RBC # BLD AUTO: 3.84 M/UL (ref 3.95–5.11)
SODIUM SERPL-SCNC: 138 MMOL/L (ref 136–145)
TRANSFUSION STATUS: NORMAL
TRANSFUSION STATUS: NORMAL
UNIT DIVISION: 0
UNIT DIVISION: 0
WBC OTHER # BLD: 5.1 K/UL (ref 3.5–11.3)

## 2025-06-28 PROCEDURE — 80053 COMPREHEN METABOLIC PANEL: CPT

## 2025-06-28 PROCEDURE — 6370000000 HC RX 637 (ALT 250 FOR IP): Performed by: CHIROPRACTOR

## 2025-06-28 PROCEDURE — 2500000003 HC RX 250 WO HCPCS: Performed by: CHIROPRACTOR

## 2025-06-28 PROCEDURE — 1200000000 HC SEMI PRIVATE

## 2025-06-28 PROCEDURE — 36415 COLL VENOUS BLD VENIPUNCTURE: CPT

## 2025-06-28 PROCEDURE — 2580000003 HC RX 258: Performed by: CHIROPRACTOR

## 2025-06-28 PROCEDURE — 6360000002 HC RX W HCPCS: Performed by: CHIROPRACTOR

## 2025-06-28 PROCEDURE — 97535 SELF CARE MNGMENT TRAINING: CPT

## 2025-06-28 PROCEDURE — 85025 COMPLETE CBC W/AUTO DIFF WBC: CPT

## 2025-06-28 PROCEDURE — 97162 PT EVAL MOD COMPLEX 30 MIN: CPT

## 2025-06-28 PROCEDURE — 97166 OT EVAL MOD COMPLEX 45 MIN: CPT

## 2025-06-28 PROCEDURE — 6370000000 HC RX 637 (ALT 250 FOR IP)

## 2025-06-28 PROCEDURE — 97530 THERAPEUTIC ACTIVITIES: CPT

## 2025-06-28 RX ORDER — LISINOPRIL 20 MG/1
20 TABLET ORAL DAILY
Status: DISCONTINUED | OUTPATIENT
Start: 2025-06-28 | End: 2025-07-01 | Stop reason: HOSPADM

## 2025-06-28 RX ORDER — ROSUVASTATIN CALCIUM 20 MG/1
20 TABLET, COATED ORAL NIGHTLY
Status: DISCONTINUED | OUTPATIENT
Start: 2025-06-28 | End: 2025-07-01 | Stop reason: HOSPADM

## 2025-06-28 RX ORDER — METOPROLOL TARTRATE 50 MG
50 TABLET ORAL DAILY
Status: DISCONTINUED | OUTPATIENT
Start: 2025-06-28 | End: 2025-07-01 | Stop reason: HOSPADM

## 2025-06-28 RX ORDER — HYDROCHLOROTHIAZIDE 25 MG/1
25 TABLET ORAL EVERY MORNING
Status: DISCONTINUED | OUTPATIENT
Start: 2025-06-29 | End: 2025-07-01 | Stop reason: HOSPADM

## 2025-06-28 RX ADMIN — ACETAMINOPHEN 650 MG: 325 TABLET ORAL at 02:24

## 2025-06-28 RX ADMIN — SODIUM CHLORIDE, SODIUM LACTATE, POTASSIUM CHLORIDE, AND CALCIUM CHLORIDE: .6; .31; .03; .02 INJECTION, SOLUTION INTRAVENOUS at 08:38

## 2025-06-28 RX ADMIN — ROSUVASTATIN CALCIUM 20 MG: 20 TABLET, FILM COATED ORAL at 20:56

## 2025-06-28 RX ADMIN — SODIUM CHLORIDE, PRESERVATIVE FREE 10 ML: 5 INJECTION INTRAVENOUS at 08:23

## 2025-06-28 RX ADMIN — OXYCODONE 10 MG: 5 TABLET ORAL at 09:21

## 2025-06-28 RX ADMIN — SODIUM CHLORIDE, SODIUM LACTATE, POTASSIUM CHLORIDE, AND CALCIUM CHLORIDE: .6; .31; .03; .02 INJECTION, SOLUTION INTRAVENOUS at 18:03

## 2025-06-28 RX ADMIN — HYDROMORPHONE HYDROCHLORIDE 0.5 MG: 1 INJECTION, SOLUTION INTRAMUSCULAR; INTRAVENOUS; SUBCUTANEOUS at 08:29

## 2025-06-28 RX ADMIN — POLYETHYLENE GLYCOL 3350 17 G: 17 POWDER, FOR SOLUTION ORAL at 08:23

## 2025-06-28 RX ADMIN — ACETAMINOPHEN 650 MG: 325 TABLET ORAL at 08:23

## 2025-06-28 RX ADMIN — OXYCODONE 10 MG: 5 TABLET ORAL at 13:16

## 2025-06-28 RX ADMIN — ACETAMINOPHEN 650 MG: 325 TABLET ORAL at 13:16

## 2025-06-28 RX ADMIN — OXYCODONE 10 MG: 5 TABLET ORAL at 22:05

## 2025-06-28 RX ADMIN — POLYETHYLENE GLYCOL 3350 17 G: 17 POWDER, FOR SOLUTION ORAL at 20:56

## 2025-06-28 RX ADMIN — Medication 2000 MG: at 08:29

## 2025-06-28 RX ADMIN — ACETAMINOPHEN 650 MG: 325 TABLET ORAL at 20:56

## 2025-06-28 RX ADMIN — OXYCODONE 10 MG: 5 TABLET ORAL at 00:35

## 2025-06-28 RX ADMIN — OXYCODONE 5 MG: 5 TABLET ORAL at 05:06

## 2025-06-28 RX ADMIN — OXYCODONE 10 MG: 5 TABLET ORAL at 18:01

## 2025-06-28 RX ADMIN — Medication 2000 MG: at 02:24

## 2025-06-28 ASSESSMENT — PAIN SCALES - GENERAL
PAINLEVEL_OUTOF10: 2
PAINLEVEL_OUTOF10: 7
PAINLEVEL_OUTOF10: 5
PAINLEVEL_OUTOF10: 2
PAINLEVEL_OUTOF10: 7
PAINLEVEL_OUTOF10: 4
PAINLEVEL_OUTOF10: 5
PAINLEVEL_OUTOF10: 7
PAINLEVEL_OUTOF10: 7
PAINLEVEL_OUTOF10: 5
PAINLEVEL_OUTOF10: 6

## 2025-06-28 ASSESSMENT — PAIN DESCRIPTION - DESCRIPTORS
DESCRIPTORS: BURNING
DESCRIPTORS: ACHING

## 2025-06-28 ASSESSMENT — PAIN DESCRIPTION - LOCATION
LOCATION: BACK
LOCATION: BACK;ABDOMEN
LOCATION: BACK
LOCATION: ABDOMEN;BACK
LOCATION: BACK
LOCATION: BACK
LOCATION: ABDOMEN;BACK
LOCATION: BACK
LOCATION: ABDOMEN;BACK
LOCATION: BACK

## 2025-06-28 ASSESSMENT — PAIN DESCRIPTION - ORIENTATION
ORIENTATION: LOWER
ORIENTATION: LOWER
ORIENTATION: MID
ORIENTATION: ANTERIOR;POSTERIOR
ORIENTATION: ANTERIOR;POSTERIOR
ORIENTATION: LOWER;POSTERIOR

## 2025-06-28 ASSESSMENT — PAIN - FUNCTIONAL ASSESSMENT
PAIN_FUNCTIONAL_ASSESSMENT: ACTIVITIES ARE NOT PREVENTED
PAIN_FUNCTIONAL_ASSESSMENT: PREVENTS OR INTERFERES SOME ACTIVE ACTIVITIES AND ADLS

## 2025-06-28 NOTE — PLAN OF CARE
Problem: Discharge Planning  Goal: Discharge to home or other facility with appropriate resources  6/28/2025 1103 by Eva Ramey RN  Outcome: Progressing  6/28/2025 0509 by Javier Anguiano RN  Outcome: Progressing     Problem: Safety - Adult  Goal: Free from fall injury  6/28/2025 1103 by Eva Ramey RN  Outcome: Progressing  6/28/2025 0509 by Javier Anguiano RN  Outcome: Progressing

## 2025-06-28 NOTE — PROGRESS NOTES
Physical Therapy  Facility/Department: [address] ORTHO/MED SURG   Physical Therapy Initial Evaluation    Patient Name: Terra Hills        MRN: [id number]    : 1950    Date of Service: 2025    No chief complaint on file.    Past Medical History:  has a past medical history of Abnormal liver function tests, Hyperlipidemia, Hypertension, Insulin resistance, Lumbar radiculopathy, Macular degeneration of left eye, Mechanical breakdown of internal fixation device of vertebrae, Obstructive sleep apnea, Osteoarthritis, Thyroid nodule, and Under care of team.  Past Surgical History:  has a past surgical history that includes hysteroscopy (2005); other surgical history (2005); Abdominal exploration surgery (1994); Ovary removal (Left); Total hip arthroplasty (Left, 2024); lumbar fusion (2024); Colonoscopy; and lumbar fusion (2025).    Discharge Recommendations  Discharge Recommendations: Patient would benefit from continued therapy after discharge  PT Equipment Recommendations  Equipment Needed: No    Assessment  Body Structures, Functions, Activity Limitations Requiring Skilled Therapeutic Intervention: Decreased functional mobility , Decreased strength, Decreased endurance  Assessment: The pt ambulated 75 ft with a RW x CGA. She moved slowly but was fairly steady throughout. She could benefit from a continuation of PT for gait , strengthening and functional mobility prior to her DC  Therapy Prognosis: Good  Decision Making: Medium Complexity  Requires PT Follow-Up: Yes  Activity Tolerance  Activity Tolerance: Patient limited by fatigue, Patient limited by pain, Patient limited by endurance  Safety Devices  Type of Devices: Call light within reach, Left in chair, Gait belt, Nurse notified  Restraints  Restraints Initially in Place: No    AM-PAC  AM-PAC Basic Mobility - Inpatient   How much help is needed turning from your back to your side while in a flat bed without using

## 2025-06-28 NOTE — PROGRESS NOTES
Progress Note    Patient:  Terra Hills  YOB: 1950     75 y.o. female    Subjective:  Patient seen and examined at bedside this morning. No new complaints or concerns per patient this morning. No acute issues overnight per nursing. Pain well-controlled. Denies: fever/chills, HA, CP, SOB, N/V, dysuria, or numbness/tingling in extremities. PT: Will work with patient later today..    Objective:   Vitals:    06/28/25 0516   BP: (!) 148/75   Pulse: 81   Resp: 16   Temp: 98.1 °F (36.7 °C)   SpO2: 95%     Gen: NAD, cooperative   Back: Appropriate post-op TTP. No rishabh-incisional induration, erythema, or fluctuance.    LE  L psoas 5/5, R psoas 5/5  L quads 5/5, R quads 5/5  L TA 5/5, R TA 5/5  L EHL 5/5, R EHL 5/5  L GSC 5/5, R GSC 5/5  Sensation intact to light touch L2-S1 bilaterally  2+ Patellar & Achilles reflexes     No results for input(s): \"WBC\", \"HGB\", \"HCT\", \"PLT\", \"INR\", \"NA\", \"K\", \"BUN\", \"CREATININE\", \"GLUCOSE\", \"ESR\", \"CRP\" in the last 72 hours.    Invalid input(s): \"PT\", \"PTT\"    Meds: Ancef  See rec for complete list    Impression: 75 y.o. female who is being seen for    -Hardware failure of prior lumbar spine fixation    Procedures, DOS: 6/27/2028  -Anterior lumbar interbody fusion L5-S1  -Revision posterior instrumentation L5-S1    Plan:     -No further orthopedic surgery intervention planned at this time.  -AAT  -Complete post op Ancef  - Follow-up post op Hb.  -Pain control: Multimodal; avoid NSAIDs  -Tolerating PO intake well  -Voiding well  -Ice (20 min, 1 hour off) for edema/pain control  -Encourage IS and deep breathing  -DVT ppx: encourage being up and OOB & EPC, no chemical AC until 6/30/2025.  -PT/OT  -Please page Ortho with any questions    Pardeep Rolle MD  Orthopedic Spine Surgery  6:26 AM 6/28/2025

## 2025-06-28 NOTE — PROGRESS NOTES
Occupational Therapy Initial Evaluation  Facility/Department: [address] ORTHO/MED SURG   Patient Name: Terra Hills        MRN: [id number]    : 1950    Date of Service: 2025    No chief complaint on file.    Past Medical History:  has a past medical history of Abnormal liver function tests, Hyperlipidemia, Hypertension, Insulin resistance, Lumbar radiculopathy, Macular degeneration of left eye, Mechanical breakdown of internal fixation device of vertebrae, Obstructive sleep apnea, Osteoarthritis, Thyroid nodule, and Under care of team.  Past Surgical History:  has a past surgical history that includes hysteroscopy (2005); other surgical history (2005); Abdominal exploration surgery (1994); Ovary removal (Left); Total hip arthroplasty (Left, 2024); lumbar fusion (2024); Colonoscopy; and lumbar fusion (2025).    Discharge Recommendations   Further therapy recommended at discharge.         Assessment  Performance deficits / Impairments: Decreased functional mobility ;Decreased ADL status;Decreased endurance;Decreased high-level IADLs  Assessment: pt demonstrated above deficits impacting occuptional performance. pt with limited social support upon discharge and reported concerns with returning to prior living arrangement at this time. Pt would benefit from continued Ot servicesi no rder to maximize safety and functional independence.  Prognosis: Good  Decision Making: Medium Complexity  REQUIRES OT FOLLOW-UP: Yes  Activity Tolerance  Activity Tolerance: Patient Tolerated treatment well  Safety Devices  Type of Devices: Call light within reach;Left in chair;Gait belt;Nurse notified  Restraints  Restraints Initially in Place: No    AM-PAC  AM-PAC Daily Activity - Inpatient   How much help is needed for putting on and taking off regular lower body clothing?: A Little  How much help is needed for bathing (which includes washing, rinsing, drying)?: A Little  How much help is  needed for toileting (which includes using toilet, bedpan, or urinal)?: A Little  How much help is needed for putting on and taking off regular upper body clothing?: A Little  How much help is needed for taking care of personal grooming?: None  How much help for eating meals?: None  AM-[facility] Inpatient Daily Activity Raw Score: 20  AM-PAC Inpatient ADL T-Scale Score : 42.03  ADL Inpatient CMS 0-100% Score: 38.32  ADL Inpatient CMS G-Code Modifier : CJ    Restrictions/Precautions  Restrictions/Precautions  Activity Level:  (activity as tolerated)  Required Braces or Orthoses?: No  Position Activity Restriction  Spinal Precautions: No Bending;No Twisting  Spinal Precautions Comments: no lifting >10 lbs  Other Position/Activity Restrictions: s/p ANTERIOR LUMBAR INTERBODY FUSION L5-S1, REVISION POSTERIOR INSTRUMENTATION L5-S1 6/27       Subjective  General  Patient assessed for rehabilitation services?: Yes  Family / Caregiver Present: No  General Comment  Comments: RN ok'd for therapy this date. pt agreeable to participate in session and cooperative/pleasant throughout.  Pain  Pre-Pain: 0  Post-Pain: 5  Pain Location: Back;Abdomen (incisions)  Pain Interventions: Repositioning;Other (Comment) (distraction)       Home Setup/Prior Level of Function  Social/Functional History  Lives With: Alone  Type of Home: House  Home Layout: Two level;Able to Live on Main level with bedroom/bathroom  Home Access: Stairs to enter without rails  Entrance Stairs - Number of Steps: 2  Bathroom Shower/Tub: Walk-in shower  Bathroom Toilet: Standard  Bathroom Equipment: Shower chair  Home Equipment: Cane;Walker - Rolling (pt reported no use of DME inside, but uses cane for community distances)  Prior Level of Assist for ADLs: Independent  Prior Level of Assist for Homemaking: Independent  Homemaking Responsibilities: Yes  Meal Prep Responsibility: Primary  Laundry Responsibility: Primary  Cleaning Responsibility: Primary  Prior Level of Assist

## 2025-06-29 PROCEDURE — 2580000003 HC RX 258: Performed by: CHIROPRACTOR

## 2025-06-29 PROCEDURE — 97116 GAIT TRAINING THERAPY: CPT

## 2025-06-29 PROCEDURE — 97530 THERAPEUTIC ACTIVITIES: CPT

## 2025-06-29 PROCEDURE — 6370000000 HC RX 637 (ALT 250 FOR IP): Performed by: CHIROPRACTOR

## 2025-06-29 PROCEDURE — 6370000000 HC RX 637 (ALT 250 FOR IP)

## 2025-06-29 PROCEDURE — 1200000000 HC SEMI PRIVATE

## 2025-06-29 RX ORDER — SENNA AND DOCUSATE SODIUM 50; 8.6 MG/1; MG/1
2 TABLET, FILM COATED ORAL DAILY PRN
Status: DISCONTINUED | OUTPATIENT
Start: 2025-06-29 | End: 2025-07-01 | Stop reason: HOSPADM

## 2025-06-29 RX ADMIN — LISINOPRIL 20 MG: 20 TABLET ORAL at 09:16

## 2025-06-29 RX ADMIN — ACETAMINOPHEN 650 MG: 325 TABLET ORAL at 02:21

## 2025-06-29 RX ADMIN — SODIUM CHLORIDE, SODIUM LACTATE, POTASSIUM CHLORIDE, AND CALCIUM CHLORIDE: .6; .31; .03; .02 INJECTION, SOLUTION INTRAVENOUS at 11:37

## 2025-06-29 RX ADMIN — ROSUVASTATIN CALCIUM 20 MG: 20 TABLET, FILM COATED ORAL at 20:22

## 2025-06-29 RX ADMIN — POLYETHYLENE GLYCOL 3350 17 G: 17 POWDER, FOR SOLUTION ORAL at 20:22

## 2025-06-29 RX ADMIN — METOPROLOL TARTRATE 50 MG: 50 TABLET, FILM COATED ORAL at 09:16

## 2025-06-29 RX ADMIN — HYDROCHLOROTHIAZIDE 25 MG: 25 TABLET ORAL at 09:17

## 2025-06-29 RX ADMIN — OXYCODONE 10 MG: 5 TABLET ORAL at 15:48

## 2025-06-29 RX ADMIN — ACETAMINOPHEN 650 MG: 325 TABLET ORAL at 09:16

## 2025-06-29 RX ADMIN — OXYCODONE 10 MG: 5 TABLET ORAL at 11:36

## 2025-06-29 RX ADMIN — OXYCODONE 10 MG: 5 TABLET ORAL at 20:22

## 2025-06-29 RX ADMIN — OXYCODONE 10 MG: 5 TABLET ORAL at 06:32

## 2025-06-29 RX ADMIN — ACETAMINOPHEN 650 MG: 325 TABLET ORAL at 13:57

## 2025-06-29 RX ADMIN — DOCUSATE SODIUM 50MG AND SENNOSIDES 8.6MG 2 TABLET: 8.6; 5 TABLET, FILM COATED ORAL at 21:16

## 2025-06-29 RX ADMIN — SODIUM CHLORIDE, SODIUM LACTATE, POTASSIUM CHLORIDE, AND CALCIUM CHLORIDE: .6; .31; .03; .02 INJECTION, SOLUTION INTRAVENOUS at 02:24

## 2025-06-29 RX ADMIN — OXYCODONE 10 MG: 5 TABLET ORAL at 02:20

## 2025-06-29 RX ADMIN — ACETAMINOPHEN 650 MG: 325 TABLET ORAL at 20:22

## 2025-06-29 RX ADMIN — POLYETHYLENE GLYCOL 3350 17 G: 17 POWDER, FOR SOLUTION ORAL at 09:17

## 2025-06-29 ASSESSMENT — PAIN SCALES - GENERAL
PAINLEVEL_OUTOF10: 7
PAINLEVEL_OUTOF10: 4
PAINLEVEL_OUTOF10: 5
PAINLEVEL_OUTOF10: 7
PAINLEVEL_OUTOF10: 7
PAINLEVEL_OUTOF10: 4
PAINLEVEL_OUTOF10: 4
PAINLEVEL_OUTOF10: 5

## 2025-06-29 ASSESSMENT — PAIN DESCRIPTION - ORIENTATION
ORIENTATION: ANTERIOR;POSTERIOR
ORIENTATION: MID
ORIENTATION: ANTERIOR

## 2025-06-29 ASSESSMENT — PAIN DESCRIPTION - DESCRIPTORS
DESCRIPTORS: DISCOMFORT
DESCRIPTORS: ACHING
DESCRIPTORS: ACHING
DESCRIPTORS: DISCOMFORT

## 2025-06-29 ASSESSMENT — PAIN DESCRIPTION - LOCATION
LOCATION: BACK
LOCATION: ABDOMEN;BACK
LOCATION: BACK

## 2025-06-29 ASSESSMENT — PAIN - FUNCTIONAL ASSESSMENT
PAIN_FUNCTIONAL_ASSESSMENT: PREVENTS OR INTERFERES WITH MANY ACTIVE NOT PASSIVE ACTIVITIES
PAIN_FUNCTIONAL_ASSESSMENT: PREVENTS OR INTERFERES SOME ACTIVE ACTIVITIES AND ADLS

## 2025-06-29 NOTE — PROGRESS NOTES
Orthopedic Progress Note    Patient:  Terra Hills  YOB: 1950     75 y.o. female    Subjective:  Patient seen and examined this morning. No complaints or concerns. No issues overnight per nursing. Pain is well controlled on current regimen. Denies fever, HA, CP, SOB, N/V, dysuria, new numbness/tingling.  Patient ambulated 75 feet with physical therapy yesterday.    Vitals reviewed, afebrile    Objective:   Vitals:    06/29/25 0250   BP:    Pulse:    Resp: 16   Temp:    SpO2:      Gen: NAD, cooperative    Cardiovascular: Regular rate   Respiratory: No acute respiratory distress, breathing comfortably    Orthopedic Exam  Lumbar spine: Appropriate post-op TTP. No rishabh-incisional induration, erythema, or fluctuance.  Dressings clean/dry/intact.     LE  L psoas 5/5, R psoas 5/5  L quads 5/5, R quads 5/5  L TA 5/5, R TA 5/5  L EHL 5/5, R EHL 5/5  L GSC 5/5, R GSC 5/5  Sensation intact to light touch L2-S1 bilaterally  2+ Patellar & Achilles reflexes     Recent Labs     06/28/25  2208   WBC 5.1   HGB 11.4*   HCT 34.9*   *      K 4.2   BUN 14   CREATININE 0.8   GLUCOSE 171*      Meds:   See rec for complete list    Impression 75 y.o. female being seen for:    -Hardware failure of prior lumbar spine fixation     Procedures, DOS: 6/27/2028  -Anterior lumbar interbody fusion L5-S1  -Revision posterior instrumentation L5-S1    Plan  -No further orthopedic surgery intervention planned at this time.  -AAT  - PMR consulted to assist with rehabilitation placement.  -Completed post op Ancef  -Pain control: Multimodal; avoid NSAIDs  -Tolerating PO intake well  -Voiding well  -Ice (20 min, 1 hour off) for edema/pain control  -Encourage IS and deep breathing  -DVT ppx: encourage being up and OOB & EPC, no chemical AC until 6/30/2025.  Will plan to start Lovenox tomorrow.  -PT/OT  -Please page Ortho with any questions    Luther Strong DO  Orthopedic Surgery Resident, PGY-2  [facility]

## 2025-06-29 NOTE — CARE COORDINATION
Case Management Assessment  Initial Evaluation    Date/Time of Evaluation: 6/29/2025 3:27 PM  Assessment Completed by: KRISTIE CURRY RN    If patient is discharged prior to next notation, then this note serves as note for discharge by case management.    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 Yes    Chart Reviewed: Yes      History Provided by: Patient  Patient Orientation: Alert and Oriented    Patient Cognition: Alert    Hospitalization in the last 30 days (Readmission):  No    If yes, Readmission Assessment in CM Navigator will be completed.    Advance Directives:      Code Status: Full Code   Patient's Primary Decision Maker is: Legal Next of Kin    Primary Decision Maker: [place] - Child - [phone]    Discharge Planning:    Patient lives with: Alone Type of Home: House  Primary Care Giver: Self  Patient Support Systems include: Family Members   Current Financial resources: Medicare  Current community resources: None  Current services prior to admission: Durable Medical Equipment            Current DME: Cane, Walker, Shower Chair            Type of Home Care services:  None    ADLS  Prior functional level: Independent in ADLs/IADLs  Current functional level: Independent in ADLs/IADLs    PT AM-PAC: 17 /24  OT AM-PAC: 20 /24    Family can provide assistance at DC: No  Would you like Case Management to discuss the discharge plan with any other family

## 2025-06-29 NOTE — PROGRESS NOTES
Physical Therapy  Facility/Department: [address] ORTHO/MED SURG   Physical Therapy Daily Treatment Note    Patient Name: Terra Hills        MRN: [id number]    : 1950    Date of Service: 2025    No chief complaint on file.    Past Medical History:  has a past medical history of Abnormal liver function tests, Hyperlipidemia, Hypertension, Insulin resistance, Lumbar radiculopathy, Macular degeneration of left eye, Mechanical breakdown of internal fixation device of vertebrae, Obstructive sleep apnea, Osteoarthritis, Thyroid nodule, and Under care of team.  Past Surgical History:  has a past surgical history that includes hysteroscopy (2005); other surgical history (2005); Abdominal exploration surgery (1994); Ovary removal (Left); Total hip arthroplasty (Left, 2024); lumbar fusion (2024); Colonoscopy; and lumbar fusion (2025).    Discharge Recommendations  Discharge Recommendations: Patient would benefit from continued therapy after discharge  PT Equipment Recommendations  Equipment Needed: No    Assessment     Assessment: pt amb 85 ft with RW & CGA of 1. pt kristen bed mobility with HOB elevated, pt could benefit from HOB flat next therapy visit. pt is concerned about discharging to home from the hospital as pt lives alone. pt would like to discharge to inpatient therapy to get stronger before discharge to home. pt could benefit from cont therapy to improve her functional mobility.  Requires PT Follow-Up: Yes  Activity Tolerance  Activity Tolerance: Patient tolerated treatment well  Activity Tolerance Comments: amb RW CGA of 1  Safety Devices  Type of Devices: Call light within reach;Left in chair;Gait belt;Nurse notified;Chair alarm in place  Restraints  Restraints Initially in Place: No    AM-PAC  AM-PAC Basic Mobility - Inpatient   How much help is needed turning from your back to your side while in a flat bed without using bedrails?: A Little  How much help is needed

## 2025-06-30 PROCEDURE — 97116 GAIT TRAINING THERAPY: CPT

## 2025-06-30 PROCEDURE — 99222 1ST HOSP IP/OBS MODERATE 55: CPT | Performed by: STUDENT IN AN ORGANIZED HEALTH CARE EDUCATION/TRAINING PROGRAM

## 2025-06-30 PROCEDURE — 6370000000 HC RX 637 (ALT 250 FOR IP)

## 2025-06-30 PROCEDURE — 6360000002 HC RX W HCPCS: Performed by: CHIROPRACTOR

## 2025-06-30 PROCEDURE — 1200000000 HC SEMI PRIVATE

## 2025-06-30 PROCEDURE — 2500000003 HC RX 250 WO HCPCS: Performed by: CHIROPRACTOR

## 2025-06-30 PROCEDURE — 97530 THERAPEUTIC ACTIVITIES: CPT

## 2025-06-30 PROCEDURE — 97535 SELF CARE MNGMENT TRAINING: CPT

## 2025-06-30 PROCEDURE — 6370000000 HC RX 637 (ALT 250 FOR IP): Performed by: CHIROPRACTOR

## 2025-06-30 RX ADMIN — ACETAMINOPHEN 650 MG: 325 TABLET ORAL at 08:54

## 2025-06-30 RX ADMIN — POLYETHYLENE GLYCOL 3350 17 G: 17 POWDER, FOR SOLUTION ORAL at 08:55

## 2025-06-30 RX ADMIN — SODIUM CHLORIDE, PRESERVATIVE FREE 10 ML: 5 INJECTION INTRAVENOUS at 00:22

## 2025-06-30 RX ADMIN — ACETAMINOPHEN 650 MG: 325 TABLET ORAL at 14:00

## 2025-06-30 RX ADMIN — POLYETHYLENE GLYCOL 3350 17 G: 17 POWDER, FOR SOLUTION ORAL at 20:38

## 2025-06-30 RX ADMIN — ACETAMINOPHEN 650 MG: 325 TABLET ORAL at 20:38

## 2025-06-30 RX ADMIN — OXYCODONE 10 MG: 5 TABLET ORAL at 00:21

## 2025-06-30 RX ADMIN — DOCUSATE SODIUM 50MG AND SENNOSIDES 8.6MG 2 TABLET: 8.6; 5 TABLET, FILM COATED ORAL at 20:39

## 2025-06-30 RX ADMIN — ACETAMINOPHEN 650 MG: 325 TABLET ORAL at 00:21

## 2025-06-30 RX ADMIN — OXYCODONE 5 MG: 5 TABLET ORAL at 05:14

## 2025-06-30 RX ADMIN — OXYCODONE 5 MG: 5 TABLET ORAL at 08:54

## 2025-06-30 RX ADMIN — OXYCODONE 5 MG: 5 TABLET ORAL at 20:39

## 2025-06-30 RX ADMIN — ROSUVASTATIN CALCIUM 20 MG: 20 TABLET, FILM COATED ORAL at 20:39

## 2025-06-30 RX ADMIN — ENOXAPARIN SODIUM 40 MG: 100 INJECTION SUBCUTANEOUS at 08:51

## 2025-06-30 RX ADMIN — HYDROCHLOROTHIAZIDE 25 MG: 25 TABLET ORAL at 08:55

## 2025-06-30 RX ADMIN — OXYCODONE 5 MG: 5 TABLET ORAL at 16:17

## 2025-06-30 RX ADMIN — OXYCODONE 5 MG: 5 TABLET ORAL at 05:08

## 2025-06-30 ASSESSMENT — PAIN DESCRIPTION - LOCATION
LOCATION: BACK
LOCATION: ABDOMEN;BACK
LOCATION: BACK

## 2025-06-30 ASSESSMENT — PAIN SCALES - GENERAL
PAINLEVEL_OUTOF10: 6
PAINLEVEL_OUTOF10: 7
PAINLEVEL_OUTOF10: 5
PAINLEVEL_OUTOF10: 5
PAINLEVEL_OUTOF10: 7
PAINLEVEL_OUTOF10: 4
PAINLEVEL_OUTOF10: 3
PAINLEVEL_OUTOF10: 4
PAINLEVEL_OUTOF10: 4
PAINLEVEL_OUTOF10: 3
PAINLEVEL_OUTOF10: 7
PAINLEVEL_OUTOF10: 4
PAINLEVEL_OUTOF10: 4

## 2025-06-30 ASSESSMENT — PAIN DESCRIPTION - DESCRIPTORS
DESCRIPTORS: DISCOMFORT

## 2025-06-30 ASSESSMENT — ENCOUNTER SYMPTOMS
SHORTNESS OF BREATH: 0
ABDOMINAL PAIN: 1
BACK PAIN: 1

## 2025-06-30 NOTE — ANESTHESIA POSTPROCEDURE EVALUATION
Department of Anesthesiology  Postprocedure Note    Patient: Terra Hills  MRN: [id number]  YOB: 1950  Date of evaluation: 6/30/2025    Procedure Summary       Date: 06/27/25 Room / Location: [address]    Anesthesia Start: 1243 Anesthesia Stop: 2001    Procedures:       ANTERIOR LUMBAR INTERBODY FUSION L5-S1      REVISION POSTERIOR INSTRUMENTATION L5-S1      ANTERIOR APPROACH (Abdomen) Diagnosis:       History of lumbar fusion      Mechanical breakdown of internal fixation device of vertebrae, initial encounter      (History of lumbar fusion [Z98.1])      (Mechanical breakdown of internal fixation device of vertebrae, initial encounter [T84.216A])    Surgeons: Isai Greer DO; James Farris MD Responsible Provider: Estrada Ibrahim MD    Anesthesia Type: general ASA Status: 3            Anesthesia Type: No value filed.    Michelle Phase I: Michelle Score: 8    Michelle Phase II:      Anesthesia Post Evaluation    Patient location during evaluation: PACU  Patient participation: complete - patient participated  Level of consciousness: sleepy but conscious  Airway patency: patent  Nausea & Vomiting: no nausea and no vomiting  Cardiovascular status: hemodynamically stable  Respiratory status: acceptable  Hydration status: euvolemic  Pain management: adequate    No notable events documented.

## 2025-06-30 NOTE — CARE COORDINATION
Case Management   Daily Progress Note       Patient Name: Terra Hills                   YOB: 1950  Diagnosis: History of lumbar fusion [Z98.1]  Mechanical breakdown of internal fixation device of vertebrae, initial encounter [T84.216A]  Adjacent segment disease of lumbar spine with history of fusion procedure [M51.369, Z98.1]  Lumbar stenosis with neurogenic claudication [M48.062]                       GMLOS: 2.4 days  Length of Stay: 3  days    Anticipated Discharge Date: One day until discharge    Readmission Risk (Low < 19, Mod (19-27), High > 27): Readmission Risk Score: 9        Current Transitional Plan    [] Home Independently    [x] Home with HC    [] Skilled Nursing Facility    [] Acute Rehabilitation    [] Long Term Acute Care (LTAC)    [] Other:     Plan for the Stay (Medical Management) :          Workflow Continuation (Additional Notes) :Patient is doing well with PTOT educated on SNF criteria and the need for b/u plan. [facility] referral placed         ANITRA FLORES RN  June 30, 2025

## 2025-06-30 NOTE — PROGRESS NOTES
Occupational Therapy  Occupational Therapy Daily Treatment Note  Facility/Department: [address] ORTHO/MED SURG   Patient Name: Terra Hills        MRN: [id number]    : 1950    Date of Service: 2025    No chief complaint on file.    Past Medical History:  has a past medical history of Abnormal liver function tests, Hyperlipidemia, Hypertension, Insulin resistance, Lumbar radiculopathy, Macular degeneration of left eye, Mechanical breakdown of internal fixation device of vertebrae, Obstructive sleep apnea, Osteoarthritis, Thyroid nodule, and Under care of team.  Past Surgical History:  has a past surgical history that includes hysteroscopy (2005); other surgical history (2005); Abdominal exploration surgery (1994); Ovary removal (Left); Total hip arthroplasty (Left, 2024); lumbar fusion (2024); Colonoscopy; lumbar fusion (2025); lumbar fusion (N/A, 2025); and lumbar fusion (N/A, 2025).    Discharge Recommendations  Discharge Recommendations: Patient would benefit from continued therapy after discharge       Assessment  Performance deficits / Impairments: Decreased functional mobility ;Decreased ADL status;Decreased endurance;Decreased high-level IADLs;Decreased balance  Prognosis: Good  REQUIRES OT FOLLOW-UP: Yes  Activity Tolerance  Activity Tolerance: Patient Tolerated treatment well  Safety Devices  Type of Devices: Call light within reach;Left in chair;Gait belt;Nurse notified  Restraints  Restraints Initially in Place: No    AM-PAC  AM-PAC Daily Activity - Inpatient   How much help is needed for putting on and taking off regular lower body clothing?: A Little  How much help is needed for bathing (which includes washing, rinsing, drying)?: A Little  How much help is needed for toileting (which includes using toilet, bedpan, or urinal)?: A Little  How much help is needed for putting on and taking off regular upper body clothing?: None  How much help is needed  for taking care of personal grooming?: None  How much help for eating meals?: None  AM-PAC Inpatient Daily Activity Raw Score: 21  AM-PAC Inpatient ADL T-Scale Score : 44.27  ADL Inpatient CMS 0-100% Score: 32.79  ADL Inpatient CMS G-Code Modifier : CJ    Restrictions/Precautions  Restrictions/Precautions  Restrictions/Precautions: General Precautions;Weight Bearing  Required Braces or Orthoses?: No  Lower Extremity Weight Bearing Restrictions  Right Lower Extremity Weight Bearing: Weight Bearing As Tolerated  Left Lower Extremity Weight Bearing: Weight Bearing As Tolerated  Position Activity Restriction  Spinal Precautions: No Bending;No Twisting  Spinal Precautions Comments: no lifting >10 lbs  Other Position/Activity Restrictions: s/p ANTERIOR LUMBAR INTERBODY FUSION L5-S1, REVISION POSTERIOR INSTRUMENTATION L5-S1 6/27       Subjective  General  Patient assessed for rehabilitation services?: Yes  Family / Caregiver Present: No  Diagnosis: L5-S1 fusion on 6/27, old hardware failure  Subjective  Subjective: RN approved therapy session, pt cooperative throughout  Pain  Pre-Pain: 4  Post-Pain: 5  Pain Location: Back  Pain Descriptor: Aching;Sore  Pain Interventions: Repositioning;Rest       Objective  Orientation  Overall Orientation Status: Within Normal Limits  Orientation Level: Oriented X4  Cognition  Overall Cognitive Status: WNL    Activities of Daily Living  Feeding: Independent  Grooming: Modified independent   LE Dressing: Supervision  Putting On/Taking Off Footwear: Supervision  Toileting: Supervision  Additional Comments: Pt supine in bed upon arrival, pt transferred to L side of bed and doffed/donned L sock using reacher/sock-iad, Pt had actually brought personal sock-aid/reacher to hospital, pt stood and transferred to standing sinkside for oral/hair-care tasks, pt transferred to toilet for usccessful urination, etc., pt stood and washed hands at sinkside before transferring to sitting in recliner, pt

## 2025-06-30 NOTE — PROGRESS NOTES
Physical Therapy  Facility/Department: [address] ORTHO/MED SURG   Physical Therapy Daily Treatment Note    Patient Name: Terra Hills        MRN: [id number]    : 1950    Date of Service: 2025      Past Medical History:  has a past medical history of Abnormal liver function tests, Hyperlipidemia, Hypertension, Insulin resistance, Lumbar radiculopathy, Macular degeneration of left eye, Mechanical breakdown of internal fixation device of vertebrae, Obstructive sleep apnea, Osteoarthritis, Thyroid nodule, and Under care of team.  Past Surgical History:  has a past surgical history that includes hysteroscopy (2005); other surgical history (2005); Abdominal exploration surgery (1994); Ovary removal (Left); Total hip arthroplasty (Left, 2024); lumbar fusion (2024); Colonoscopy; lumbar fusion (2025); lumbar fusion (N/A, 2025); and lumbar fusion (N/A, 2025).    Discharge Recommendations  Discharge Recommendations: Patient would benefit from continued therapy after discharge  PT Equipment Recommendations  Equipment Needed: No    Assessment  Body Structures, Functions, Activity Limitations Requiring Skilled Therapeutic Intervention: Decreased functional mobility ;Decreased strength;Decreased endurance  Assessment: Pt ambulated 15ft x2 with no AD requiring Marley. Pt demonstrating mild instability without use of AD, reaching for objects in room for external support. Pt does not utilize AD at baseline. Pt also performed x4 steps up requiring Marley to safely perform. Recommending continued PT to address balance and strength deficits and maximize safety and independence with mobility as pt lives alone.  Therapy Prognosis: Good  Requires PT Follow-Up: Yes  Activity Tolerance  Activity Tolerance: Patient tolerated treatment well;Patient limited by pain  Safety Devices  Type of Devices: Call light within reach;Left in chair;Gait belt;Nurse notified;Chair alarm in  Back  Pain Descriptor: Aching;Sore  Pain Interventions: Repositioning;Rest    Objective  Orientation  Overall Orientation Status: Within Normal Limits  Orientation Level: Oriented X4  Cognition  Overall Cognitive Status: WNL      Mobility   Bed mobility  Supine to Sit: Unable to assess  Sit to Supine: Unable to assess  Scooting: Stand by assistance  Bed Mobility Comments: Pt up in chair upon entry/exit this date, able to scoot B hips in/out of chair with SBA.    Transfers  Sit to Stand: Contact guard assistance  Stand to Sit: Contact guard assistance  Comment: Pt performed multiple transfers from chair with RW and without AD, required CGA for all transfer for safety.    Ambulation  Surface: Level tile  Device: Rolling Walker  Assistance: Contact guard assistance  Quality of Gait: overall fair stability, no significant LOB  Gait Deviations: Slow Rosemary;Decreased step length;Decreased step height  Distance: 250ft  Comments: Pt ambulated ~250ft with RW, overall fair stability. Pt requesting to attempt ambulation without AD, pt did not utilize AD prior to surgery.  More Ambulation?: Yes  Ambulation 2  Surface - 2: level tile  Device 2: No device  Assistance 2: Minimal assistance  Quality of Gait 2: wide CONRADO, waddling gait, reaching for objects for support, mildly unsteady.  Gait Deviations: Slow Rosemary;Decreased step length;Decreased step height;Increased CONRADO;Staggers  Distance: 15ft x2  Comments: no significant LOB, although unsteady without use of AD.    Stairs/Curb  Stairs?: Yes  Stairs  # Steps : 4  Stairs Height: 6\"  Rails: Bilateral (RW used to simulate HR.)  Device: No Device  Assistance: Minimal assistance  Comment: Pt required Marley to perform step-ups on 6inch step w/ RW used to stimulate HR. Pt able to perform with Marley with use of HR, but does not have HR on stairs at times. When attempting to perform without use of HR, pt required modA to perform x1 step up without use of HR.      Balance

## 2025-06-30 NOTE — PROGRESS NOTES
Orthopedic Progress Note    Patient:  Terra Hills  YOB: 1950     75 y.o. female    Subjective:  Patient seen and examined this morning. No complaints or concerns. No issues overnight per nursing. Pain is well controlled on current regimen. Denies fever, HA, CP, SOB, N/V, new numbness/tingling. No BM, but flatus +.  Patient was able to ambulate a total of 85 feet with a rolling walker and contact-guard assist yesterday with physical therapy.    Vitals reviewed, afebrile    Objective:   Vitals:    06/30/25 0051   BP:    Pulse:    Resp: 16   Temp:    SpO2:      Gen: NAD, cooperative    Cardiovascular: Regular rate on monitor  Respiratory: No acute respiratory distress, breathing comfortably    Orthopedic Exam  Abdominal:  Optifoam dressing overlying the surgical incision to the abdomen clean/dry/intact without evidence strikethrough.  Patient is appropriately tender to palpation at about the surgical incision.  Lumbar spine:   Appropriate TTP at about the surgical incision.  No expressible drainage or palpable fluctuance in the immediate vicinity of the surgical incision appreciable on the dressings. Dressings clean/dry/intact.  LE  L psoas 5/5, R psoas 5/5  L quads 5/5, R quads 5/5  L TA 5/5, R TA 5/5  L EHL 5/5, R EHL 5/5  L GSC 5/5, R GSC 5/5  Sensation intact to light touch L2-S1 bilaterally  2+ Patellar & Achilles reflexes     Recent Labs     06/28/25  2208   WBC 5.1   HGB 11.4*   HCT 34.9*   *      K 4.2   BUN 14   CREATININE 0.8   GLUCOSE 171*      Meds:   Abx: Completed postop Ancef  DVT ppx: Lovenox 40 mg daily  See rec for complete list    Impression 75 y.o. female who is being seen for:    -Hardware failure of prior lumbar spine fixation     DOS: 6/27/2028 with Dr. Grere  -Anterior lumbar interbody fusion L5-S1  -Revision posterior instrumentation L5-S1     Plan  -No further orthopedic surgery intervention planned at this time.  -AAT  - PMR consulted to assist

## 2025-06-30 NOTE — PLAN OF CARE
Problem: Discharge Planning  Goal: Discharge to home or other facility with appropriate resources  6/30/2025 0346 by Annika Guerra RN  Outcome: Progressing  6/29/2025 1706 by Naman Burkett RN  Outcome: Progressing     Problem: Safety - Adult  Goal: Free from fall injury  6/30/2025 0346 by Annika Guerra RN  Outcome: Progressing  6/29/2025 1706 by Naman Burkett RN  Outcome: Progressing     Problem: ABCDS Injury Assessment  Goal: Absence of physical injury  6/30/2025 0346 by Annika Guerra RN  Outcome: Progressing  6/29/2025 1706 by Naman Burkett RN  Outcome: Progressing     Problem: Pain  Goal: Verbalizes/displays adequate comfort level or baseline comfort level  6/30/2025 0346 by Annika Guerra RN  Outcome: Progressing  6/29/2025 1706 by Naman Burkett RN  Outcome: Progressing

## 2025-06-30 NOTE — CONSULTS
Physical Medicine & Rehabilitation  Consult Note      Admitting Physician:   Isai Greer DO    Primary Care Provider:   Desirae Calhoun DO     Reason for Consult:  Acute Inpatient Rehabilitation    Chief Complaint: Low back and right lower limb pain    History of Present Illness:  Referring Provider is requesting an evaluation for appropriate placement upon discharge from acute care. History from chart review and patient.    Terra Hills is a 75 y.o. right-handed female with history of HTN, HLD, DERIAN, macular degeneration admitted to [facility] on 6/27/2025.      She initially presented for elective surgical intervention for low back and right lower limb pain.  She underwent ALIF L5-S1 and revision posterior instrumentation L5-S1 on 6/27/25 (Dr. Greer, Dr. Flores).    She reports ongoing back and abdominal pain.  She also notes left hand swelling.  She states that she has chronic numbness/tingling in the toes.    Review of Systems:  Review of Systems   Constitutional:  Negative for fever.   Respiratory:  Negative for shortness of breath.    Cardiovascular:  Negative for chest pain.   Gastrointestinal:  Positive for abdominal pain.   Musculoskeletal:  Positive for back pain.   Neurological:  Positive for sensory change.      Premorbid function:  Independent    Current function:  Restrictions/ Precautions-  Restrictions/Precautions  Restrictions/Precautions: General Precautions, Weight Bearing  Activity Level:  (activity as tolerated)  Required Braces or Orthoses?: No    PT:    Bed Mobility-  Bed mobility  Supine to Sit: Unable to assess  Sit to Supine: Unable to assess  Scooting: Stand by assistance  Bed Mobility Comments: Pt up in chair upon entry/exit this date, able to scoot B hips in/out of chair with SBA.    Transfers-  Transfers  Sit to Stand: Contact guard assistance  Stand to Sit: Contact guard assistance  Comment: Pt performed multiple transfers from chair with RW and without AD,

## 2025-07-01 VITALS
HEIGHT: 62 IN | BODY MASS INDEX: 37.73 KG/M2 | DIASTOLIC BLOOD PRESSURE: 63 MMHG | SYSTOLIC BLOOD PRESSURE: 122 MMHG | HEART RATE: 80 BPM | WEIGHT: 205 LBS | RESPIRATION RATE: 17 BRPM | OXYGEN SATURATION: 94 % | TEMPERATURE: 98.8 F

## 2025-07-01 PROCEDURE — 6360000002 HC RX W HCPCS: Performed by: CHIROPRACTOR

## 2025-07-01 PROCEDURE — 6370000000 HC RX 637 (ALT 250 FOR IP)

## 2025-07-01 PROCEDURE — 6370000000 HC RX 637 (ALT 250 FOR IP): Performed by: CHIROPRACTOR

## 2025-07-01 RX ADMIN — LISINOPRIL 20 MG: 20 TABLET ORAL at 08:48

## 2025-07-01 RX ADMIN — ENOXAPARIN SODIUM 40 MG: 100 INJECTION SUBCUTANEOUS at 08:48

## 2025-07-01 RX ADMIN — ACETAMINOPHEN 650 MG: 325 TABLET ORAL at 14:56

## 2025-07-01 RX ADMIN — OXYCODONE 5 MG: 5 TABLET ORAL at 00:18

## 2025-07-01 RX ADMIN — ACETAMINOPHEN 650 MG: 325 TABLET ORAL at 08:47

## 2025-07-01 RX ADMIN — POLYETHYLENE GLYCOL 3350 17 G: 17 POWDER, FOR SOLUTION ORAL at 08:46

## 2025-07-01 RX ADMIN — OXYCODONE 10 MG: 5 TABLET ORAL at 14:56

## 2025-07-01 RX ADMIN — HYDROCHLOROTHIAZIDE 25 MG: 25 TABLET ORAL at 08:48

## 2025-07-01 RX ADMIN — OXYCODONE 5 MG: 5 TABLET ORAL at 05:39

## 2025-07-01 RX ADMIN — METOPROLOL TARTRATE 50 MG: 50 TABLET, FILM COATED ORAL at 08:54

## 2025-07-01 ASSESSMENT — PAIN SCALES - GENERAL
PAINLEVEL_OUTOF10: 5
PAINLEVEL_OUTOF10: 7
PAINLEVEL_OUTOF10: 4
PAINLEVEL_OUTOF10: 5
PAINLEVEL_OUTOF10: 5
PAINLEVEL_OUTOF10: 4
PAINLEVEL_OUTOF10: 4

## 2025-07-01 ASSESSMENT — PAIN DESCRIPTION - LOCATION
LOCATION: BACK
LOCATION: INCISION

## 2025-07-01 ASSESSMENT — PAIN DESCRIPTION - DESCRIPTORS
DESCRIPTORS: ACHING;DISCOMFORT
DESCRIPTORS: BURNING

## 2025-07-01 ASSESSMENT — PAIN DESCRIPTION - ORIENTATION
ORIENTATION: ANTERIOR
ORIENTATION: LOWER

## 2025-07-01 NOTE — DISCHARGE INSTR - COC
Continuity of Care Form    Patient Name: Terra Hills   :  1950  MRN:  [id number]    Admit date:  2025  Discharge date:  25    Code Status Order: Full Code   Advance Directives:    Date/Time Healthcare Directive Type of Healthcare Directive Copy in Chart Healthcare Agent Appointed Healthcare Agent's Name Healthcare Agent's Phone Number    25 1000 No, patient does not have an advance directive for healthcare treatment  --  --  --  --  --             Admitting Physician:  Isai Greer DO  PCP: Desirae Calhoun DO    Discharging Nurse: Nereida  Discharging Hospital Unit/Room#: 0247/0247-01  Discharging Unit Phone Number: [phone]    Emergency Contact:   Extended Emergency Contact Information  Primary Emergency Contact: Ana Hillsinda   [facility]  Home Phone: [phone]  Relation: Child  Secondary Emergency Contact: olivelisalisbet  Mobile Phone: [phone]  Relation: Child    Past Surgical History:  Past Surgical History:   Procedure Laterality Date    ABDOMINAL EXPLORATION SURGERY  1994    With left salpingo-oophorectomy.    COLONOSCOPY      HYSTEROSCOPY  2005    With hysteroscopic resection of polyps and submucous fibroid performed by Dr. Cardona.    LUMBAR FUSION  2024    Vivian King [place]    LUMBAR FUSION  2025    ANTERIOR LUMBAR INTERBODY FUSION L5-S1    LUMBAR FUSION N/A 2025    ANTERIOR LUMBAR INTERBODY FUSION L5-S1 performed by Isai Greer DO at [facility] OR    LUMBAR FUSION N/A 2025    REVISION POSTERIOR INSTRUMENTATION L5-S1 performed by Isai Greer DO at [facility] OR    OTHER SURGICAL HISTORY  2005    Had intraductal papilloma of the right breast excised.    OVARY REMOVAL Left     TOTAL HIP ARTHROPLASTY Left 2024    HIP TOTAL ARTHROPLASTY ANTERIOR APPROACH performed by Nathaniel Estrella DO at [facility] OR       Immunization History:   Immunization History   Administered Date(s) Administered

## 2025-07-01 NOTE — PROGRESS NOTES
CLINICAL PHARMACY NOTE: MEDS TO BEDS    Total # of Prescriptions Filled: 3   The following medications were delivered to the patient:  Zofran  Tizanidine  norco    Additional Documentation:

## 2025-07-01 NOTE — CARE COORDINATION
Case Management   Daily Progress Note       Patient Name: Terra Hills                   YOB: 1950  Diagnosis: History of lumbar fusion [Z98.1]  Mechanical breakdown of internal fixation device of vertebrae, initial encounter [T84.216A]  Adjacent segment disease of lumbar spine with history of fusion procedure [M51.369, Z98.1]  Lumbar stenosis with neurogenic claudication [M48.062]                       GMLOS: 2.4 days  Length of Stay: 4  days    Anticipated Discharge Date: Ready for discharge    Readmission Risk (Low < 19, Mod (19-27), High > 27): Readmission Risk Score: 9.4        Current Transitional Plan    [] Home Independently    [x] Home with HC    [] Skilled Nursing Facility    [] Acute Rehabilitation    [] Long Term Acute Care (LTAC)    [] Other:     Plan for the Stay (Medical Management) :          Workflow Continuation (Additional Notes) :[facility] declined d/t staffing. Pt requested [facility] referral placed.     [facility] accepted        ANITRA FLORES RN  July 1, 2025

## 2025-07-01 NOTE — PROGRESS NOTES
Orthopedic Progress Note    Patient:  Terra Hills  YOB: 1950     75 y.o. female    Subjective:  Patient seen and examined this morning. No complaints or concerns. No issues overnight per nursing. Pain is well controlled on current regimen. Denies fever, HA, CP, SOB, N/V, dysuria, new numbness/tingling. No BM, but flatus +.  Voiding appropriately. Was able to ambulate with PT yesterday 30 feet with rolling walker.  Patient was evaluated by PMNR yesterday in which they did not recommend inpatient rehab placement.  At this point in time case management is working on home health care options.  Patient understands that she does not qualify for an ARU and is willing to go home at this point in time although disappointed.    Vitals reviewed, afebrile    Objective:   Vitals:    07/01/25 0048   BP:    Pulse:    Resp: 16   Temp:    SpO2:      Gen: NAD, cooperative    Cardiovascular: Regular rate   Respiratory: No acute respiratory distress, breathing comfortably    Orthopedic Exam  Abdominal: Optifoam dressing overlying surgical incisions clean/dry/intact without strikethrough.  Appropriate tenting surrounding surgical incision.  Compartments soft and easily compressible    Lumbar: Optifoam dressing overlying surgical incision was partially torn off but was clean/dry/intact without strikethrough.  Optifoam bandage was replaced.  Appropriate tender to palpation overlying surgical incision.  Compartment soft easily compressible.  Sensation intact to light touch around surgical incision.    LE  L psoas 5/5, R psoas 5/5  L quads 5/5, R quads 5/5  L TA 5/5, R TA 5/5  L EHL 5/5, R EHL 5/5  L GSC 5/5, R GSC 5/5  Sensation intact to light touch L2-S1 bilaterally  2+ Patellar & Achilles reflexes    Recent Labs     06/28/25  2208   WBC 5.1   HGB 11.4*   HCT 34.9*   *      K 4.2   BUN 14   CREATININE 0.8   GLUCOSE 171*      Meds:   DVT ppx: Lovenox 40 mg twice daily  See rec for complete

## 2025-07-01 NOTE — PLAN OF CARE
Problem: Discharge Planning  Goal: Discharge to home or other facility with appropriate resources  7/1/2025 0338 by Annika Guerra RN  Outcome: Progressing  6/30/2025 1809 by Naman Burkett RN  Outcome: Progressing     Problem: Safety - Adult  Goal: Free from fall injury  7/1/2025 0338 by Annika Guerra RN  Outcome: Progressing  6/30/2025 1809 by Naman Burkett RN  Outcome: Progressing     Problem: ABCDS Injury Assessment  Goal: Absence of physical injury  7/1/2025 0338 by Annika Guerra RN  Outcome: Progressing  6/30/2025 1809 by Naman Burkett RN  Outcome: Progressing     Problem: Pain  Goal: Verbalizes/displays adequate comfort level or baseline comfort level  7/1/2025 0338 by Annika Guerra RN  Outcome: Progressing  6/30/2025 1809 by Naman Burkett RN  Outcome: Progressing

## 2025-07-02 ENCOUNTER — TELEPHONE (OUTPATIENT)
Dept: FAMILY MEDICINE CLINIC | Age: 75
End: 2025-07-02

## 2025-07-02 NOTE — TELEPHONE ENCOUNTER
Patient discharged from [facility] on 7/1/25  Diagnosis: Adjacent segment disease of lumbar spine with history of fusion procedure

## 2025-07-02 NOTE — TELEPHONE ENCOUNTER
Care Transitions Initial Follow Up Call    Outreach made within 2 business days of discharge: Yes    Patient: Terra Hills Patient : 1950   MRN: [id number]  Reason for Admission: anterior lumbar interbody fusion L5-S1  Discharge Date: 25       Spoke with: patient     Discharge department/facility: [facility] Interactive Patient Contact:  Was patient able to fill all prescriptions: Yes  Was patient instructed to bring all medications to the follow-up visit: Yes  Is patient taking all medications as directed in the discharge summary? Yes  Does patient understand their discharge instructions: Yes  Does patient have questions or concerns that need addressed prior to 7-14 day follow up office visit: yes - patient states she doesn't feel she needs to see Dr Calhoun but did have a question -see below    Additional needs identified to be addressed with provider  Standard priority: patient questions if she could start back on lasix at least short term? She had an 8 lb weight gain from the day of surgery until this morning. States her ankles and legs feel \"tight\". Says she still has 7 lasix tabs left from previously being on it. Bowels are moving well. Home Health PT just left from assessing her. No other concerns. Uses [facility] pharmacy if you prescribe something. Let patient know.              Scheduled appointment with PCP within 7-14 days    Follow Up  Future Appointments   Date Time Provider Department Center   2025 10:40 AM Desirae Calhoun DO [facility]   11/3/2025 10:15 AM [facility]       Anali Allen, KHANH

## 2025-07-02 NOTE — TELEPHONE ENCOUNTER
Care Transitions Initial Follow Up Call    Outreach made within 2 business days of discharge: Yes    Patient: Terra Hills Patient : 1950   MRN: [id number]  Reason for Admission: anterior lumbar interbody fusion L5-S1  Discharge Date: 25       Spoke with: Left message for patient to return call    Discharge department/facility: Anali Mcknight RN

## 2025-07-03 NOTE — DISCHARGE SUMMARY
Orthopaedic Discharge Summary     Patient ID:  Terra Hills  [id number]  75 y.o.  1950    Admit date: 6/27/2025    Discharge date and time: 7/1/2025  6:20 PM     Admitting Physician: Isai Greer DO     Discharge Physician: same    Admission Diagnoses: History of lumbar fusion [Z98.1]  Mechanical breakdown of internal fixation device of vertebrae, initial encounter [T84.216A]  Adjacent segment disease of lumbar spine with history of fusion procedure [M51.369, Z98.1]  Lumbar stenosis with neurogenic claudication [M48.062]    Discharge Diagnoses: same    Admission Condition: fair    Discharged Condition: fair    Surgical procedure: NTERIOR LUMBAR INTERBODY FUSION L5-S1  ANTERIOR APPROACH  REVISION POSTERIOR INSTRUMENTATION L5-S1    CONSULT SERVICES    [x] Internal Medicine    [] Neurosurgery    [] Urology     [] Trauma     [] Critical Care    [] GI    [] ID     [] Neurology    [] Vascular    [] General Surgery    [] Pain Management     [] None    [] Other:     Disposition: home    Patient Instructions:      Medication List        START taking these medications      HYDROcodone-acetaminophen 5-325 MG per tablet  Commonly known as: NORCO  Take 1 tablet by mouth every 6 hours as needed for Pain (Intended supply: 7 days. Take lowest dose possible to manage pain) for up to 30 doses. Max Daily Amount: 4 tablets     ondansetron 4 MG disintegrating tablet  Commonly known as: ZOFRAN-ODT  Take 1 tablet by mouth 3 times daily as needed for Nausea or Vomiting     tiZANidine 2 MG tablet  Commonly known as: ZANAFLEX  Take 1 tablet by mouth every 8 hours as needed (muscle spasms)            CONTINUE taking these medications      amLODIPine 5 MG tablet  Commonly known as: NORVASC  TAKE 1 TABLET BY MOUTH TWICE  DAILY     docusate sodium 100 MG capsule  Commonly known as: COLACE     FISH OIL PO     hydroCHLOROthiazide 25 MG tablet  Commonly known as: HYDRODIURIL  Take 1 tablet by mouth every morning     lisinopril 20 MG

## 2025-07-06 NOTE — OP NOTE
instead do just new L5 and S1 posterior instrumentation. Medtronic capped 4.75 mm cobalt chrome rods were placed between bilateral L5 and S1 pedicle screws and provisionally secured with set screws. Repeat AP and lateral lumbar x-rays were then obtained demonstrating appropriate posterior instrumentation and stable anterior cage placement. All set screws were then final tightened bilaterally. Attention was then directed back  the right L5-S1 interlaminar region and small durotomy repaired with 6-0 Prolene. A valsalva held by anesthesia was done and no significant further CSF leak noted. Duraseal was placed over it and layered closure of posterior incision was then done with 0-Vicryl, 2-0 Vicryl, and 4-0 Monocryl. Telfa, 4x4 gauze, and Tegaderm dressings applied.     All instrument and sponge counts were correct prior to the conclusion of the procedure. Patient was then transferred from the operating table to hospital bed and reversed from general anesthesia without issue. Patient was transported from the operating theater to the post anesthesia care unit in a stable condition. Patient will be admitted to medical-surgical floor post-operatively for pain control, bowel regimen, and therapy evaluations. Patient may mobilize as tolerated, though avoid bending, lifting, and twisting as much as possible. Follow up in office two weeks.

## 2025-07-08 ENCOUNTER — TELEPHONE (OUTPATIENT)
Dept: FAMILY MEDICINE CLINIC | Age: 75
End: 2025-07-08
Payer: MEDICARE

## 2025-07-08 DIAGNOSIS — D69.6 THROMBOCYTOPENIA: ICD-10-CM

## 2025-07-08 DIAGNOSIS — H35.30 MACULAR DEGENERATION, UNSPECIFIED LATERALITY, UNSPECIFIED TYPE: ICD-10-CM

## 2025-07-08 DIAGNOSIS — I10 ESSENTIAL HYPERTENSION: ICD-10-CM

## 2025-07-08 DIAGNOSIS — E78.2 MIXED HYPERLIPIDEMIA: ICD-10-CM

## 2025-07-08 DIAGNOSIS — T84.216D: Primary | ICD-10-CM

## 2025-07-08 PROCEDURE — G0180 MD CERTIFICATION HHA PATIENT: HCPCS | Performed by: FAMILY MEDICINE

## 2025-07-08 NOTE — TELEPHONE ENCOUNTER
Home health plan of care reviewed and certification completed 7/8/25 on patient for service dates 7/2/25 to 8/30/25.  Verified current medications. Physician time spent on activities to coordinate service, documenting, medical decision making, review of reports/treatment plans/test results is 15 minutes.

## 2025-07-11 ENCOUNTER — TELEMEDICINE (OUTPATIENT)
Dept: FAMILY MEDICINE CLINIC | Age: 75
End: 2025-07-11

## 2025-07-11 DIAGNOSIS — M48.061 SPINAL STENOSIS OF LUMBAR REGION, UNSPECIFIED WHETHER NEUROGENIC CLAUDICATION PRESENT: ICD-10-CM

## 2025-07-11 DIAGNOSIS — Z98.1 S/P LUMBAR FUSION: ICD-10-CM

## 2025-07-11 DIAGNOSIS — M54.16 LUMBAR RADICULOPATHY: ICD-10-CM

## 2025-07-11 DIAGNOSIS — T84.216D: Primary | ICD-10-CM

## 2025-07-11 DIAGNOSIS — Z09 HOSPITAL DISCHARGE FOLLOW-UP: ICD-10-CM

## 2025-07-11 ASSESSMENT — ENCOUNTER SYMPTOMS
BACK PAIN: 1
GASTROINTESTINAL NEGATIVE: 1
RESPIRATORY NEGATIVE: 1

## 2025-07-11 NOTE — PROGRESS NOTES
Post-Discharge Transitional Care Follow Up      Terra Hills   YOB: 1950    Date of Office Visit:  7/11/2025  Date of Hospital Admission: 6/27/25  Date of Hospital Discharge: 7/1/25  Readmission Risk Score (high >=14%. Medium >=10%):Readmission Risk Score: 9.4      Care management risk score Rising risk (score 2-5) and Complex Care (Scores >=6): No Risk Score On File     Non face to face  following discharge, date last encounter closed (first attempt may have been earlier): 07/02/2025     Call initiated 2 business days of discharge: Yes            Subjective:   Inpatient course: Discharge summary reviewed- see chart.    History of Present Illness  The patient is a 75-year-old female who presents for a transitional care follow-up from hospitalization at [facility] due to anterior lumbar interbody fusion L5-S1 and revision of posterior instrumentation at L5-S1.    She reports that her nerve has been irritated ever since her last surgery last November, but is now showing signs of recovery, with a significant decrease in severity. The surgery was performed on 06/27/2025, and she was discharged on 07/01/2025. Since then, she has been receiving home health care twice a week, including physical therapy, which she has been participating in for the past two weeks. She is mobile and able to get up and move around without difficulty.     Her surgical wound appears to be healing well. During a follow-up appointment with her surgeon yesterday, the dressings were removed and not replaced as the wound was healing well. The surgeon used glue instead of stitches and informed her that the glue would gradually come off as the wound heals. Her bowel movements have returned to normal following the surgery. She was prescribed pain medication upon discharge but only took it for two days. Currently, she manages any discomfort with Tylenol, taking it once daily. She acknowledges that it will take some time before she

## 2025-08-05 RX ORDER — MELOXICAM 15 MG/1
15 TABLET ORAL DAILY
Qty: 90 TABLET | Refills: 1 | Status: SHIPPED | OUTPATIENT
Start: 2025-08-05

## (undated) DEVICE — PIN, 9733235, 100MM, STERILE, PERC REF

## (undated) DEVICE — BLADE,CARBON-STEEL,15,STRL,DISPOSABLE,TB: Brand: MEDLINE

## (undated) DEVICE — COVER LT HNDL BLU PLAS

## (undated) DEVICE — SURGICAL SUCTION CONNECTING TUBE WITH MALE CONNECTOR AND SUCTION CLAMP, 2 FT. LONG (.6 M), 5 MM I.D.: Brand: CONMED

## (undated) DEVICE — CONTAINER,SPECIMEN,OR STERILE,4OZ: Brand: MEDLINE

## (undated) DEVICE — 6619 2 PTNT ISO SYS INCISE AREA&LT;(&GT;&&LT;)&GT;P: Brand: STERI-DRAPE™ IOBAN™ 2

## (undated) DEVICE — SPONGE,PEANUT,XRAY,ST,SM,3/8",5/CARD: Brand: MEDLINE INDUSTRIES, INC.

## (undated) DEVICE — COVER,MAYO STAND,XL,STERILE: Brand: MEDLINE

## (undated) DEVICE — SUTURE VICRYL SZ 2-0 L18IN ABSRB UD CT-1 L36MM 1/2 CIR J839D

## (undated) DEVICE — PAD GEN USE BORDERED ADH 14IN 2IN AND 12IN 4IN GZ UNIV ST

## (undated) DEVICE — DRESSING FOAM W4XL10IN AG SIL ADH ANTIMIC POSTOP OPTIFOAM

## (undated) DEVICE — ZIPPERED TOGA, 2X LARGE: Brand: FLYTE, SURGICOOL

## (undated) DEVICE — STAZ MAJOR BASIN: Brand: MEDLINE INDUSTRIES, INC.

## (undated) DEVICE — SUTURE PDS II SZ 0 L60IN ABSRB VLT L65MM TP-1 1/2 CIR Z991G

## (undated) DEVICE — TOOL MR8-31TD3030 MR8 TWST DRIL 3MMX30MM: Brand: MIDAS REX

## (undated) DEVICE — GLOVE ORANGE PI 8   MSG9080

## (undated) DEVICE — RESERVOIR AUTOTRNS 3L W/ 150UM RAISE FLTR FOR CELL SAVR

## (undated) DEVICE — BLOOD TRANSFER PACK 600 CC W/ CPLR

## (undated) DEVICE — COVER,TABLE,HEAVY DUTY,60"X90",STRL: Brand: MEDLINE

## (undated) DEVICE — OSCILLATING TIP SAW CARTRIDGE: Brand: PRECISION FALCON

## (undated) DEVICE — TOOL MR8-14MH30 MR8 14CM MATCH 3MM: Brand: MIDAS REX MR8

## (undated) DEVICE — DRESSING TRNSPAR W2XL2.75IN FLM SHT SEMIPERMEABLE WIND

## (undated) DEVICE — SYRINGE IRRIG 60ML SFT PLIABLE BLB EZ TO GRP 1 HND USE W/

## (undated) DEVICE — SUTURE VICRYL + SZ 3-0 L27IN ABSRB UD L26MM SH 1/2 CIR VCP416H

## (undated) DEVICE — Device

## (undated) DEVICE — 3M™ IOBAN™ 2 ANTIMICROBIAL INCISE DRAPE 6650EZ: Brand: IOBAN™ 2

## (undated) DEVICE — APPLICATOR MEDICATED 26 CC SOLUTION HI LT ORNG CHLORAPREP

## (undated) DEVICE — DECANTER BAG 9": Brand: MEDLINE INDUSTRIES, INC.

## (undated) DEVICE — AGENT HEMOSTATIC SURGIFLOW MATRIX KIT W/THROMBIN

## (undated) DEVICE — ELECTRODE PT RET AD L9FT HI MOIST COND ADH HYDRGEL CORDED

## (undated) DEVICE — SHEET,DRAPE,70X100,STERILE: Brand: MEDLINE

## (undated) DEVICE — BLADE ES L6IN ELASTOMERIC COAT EXT DURABLE BEND UPTO 90DEG

## (undated) DEVICE — BANDAGE COBAN 4 IN COMPR W4INXL5YD FOAM COHESIVE QUIK STK SELF ADH SFT

## (undated) DEVICE — 4-PORT MANIFOLD: Brand: NEPTUNE 2

## (undated) DEVICE — MARKER SURG SKIN UTIL BLK REG TIP NONSMEARING W/ 6IN RUL

## (undated) DEVICE — STRAP,POSITIONING,KNEE/BODY,FOAM,4X60": Brand: MEDLINE

## (undated) DEVICE — SUTURE ABSORBABLE MONOFILAMENT 2-0 CT-1 24 CM 36 MM VIO PDS+

## (undated) DEVICE — TOWEL,OR,DSP,ST,BLUE,DLX,XR,4/PK,20PK/CS: Brand: MEDLINE

## (undated) DEVICE — BIPOLAR SEALER 23-112-1 AQM 6.0: Brand: AQUAMANTYS ®

## (undated) DEVICE — SOLUTION IRRIG 500ML 0.9% SOD CHLO USP POUR PLAS BTL

## (undated) DEVICE — STRAP ARMBRD W1.5XL32IN FOAM STR YET SFT W/ HK AND LOOP

## (undated) DEVICE — GLOVE ORANGE PI 8 1/2   MSG9085

## (undated) DEVICE — INTENDED FOR TISSUE SEPARATION, AND OTHER PROCEDURES THAT REQUIRE A SHARP SURGICAL BLADE TO PUNCTURE OR CUT.: Brand: BARD-PARKER ® STAINLESS STEEL BLADES

## (undated) DEVICE — SUTURE NONABSORBABLE MONOFILAMENT 6-0 C-1 1X30 IN PROLENE 8706H

## (undated) DEVICE — C-ARM: Brand: UNBRANDED

## (undated) DEVICE — GAUZE,SPONGE,4"X4",16PLY,XRAY,STRL,LF: Brand: MEDLINE

## (undated) DEVICE — PROTECTOR ULN NRV PUR FOAM HK LOOP STRP ANATOMICALLY

## (undated) DEVICE — GLOVE SURG SZ 85 CRM LTX FREE POLYISOPRENE POLYMER BEAD ANTI

## (undated) DEVICE — STERILE PATIENT PROTECTIVE PAD FOR IMP® KNEE POSITIONERS & COHESIVE WRAP (10 / CASE): Brand: DE MAYO KNEE POSITIONER®

## (undated) DEVICE — GOWN,AURORA,NONREINFORCED,LARGE: Brand: MEDLINE

## (undated) DEVICE — SUTURE ABSORBABLE BRAIDED 2-0 CT-1 27 IN UD VICRYL J259H

## (undated) DEVICE — SUTURE VICRYL + SZ 0 L18IN ABSRB UD L36MM CT-1 1/2 CIR VCP840D

## (undated) DEVICE — SOLUTION IV 250ML 0.9% SOD CHL PH 5 INJ USP VIAFLX PLAS

## (undated) DEVICE — THE STERILE LIGHT HANDLE COVER IS USED WITH STERIS SURGICAL LIGHTING AND VISUALIZATION SYSTEMS.

## (undated) DEVICE — SUTURE MONOCRYL + SZ 4-0 L18IN ABSRB UD L19MM PS-2 3/8 CIR MCP496G

## (undated) DEVICE — SUTURE MONOCRYL SZ 3-0 L27IN ABSRB UD L26MM SH 1/2 CIR Y416H

## (undated) DEVICE — SUTURE N ABSRB L 36 IN SZ 5-0 NDL L 13 MM POLYPRO OR PPL BLU

## (undated) DEVICE — NEEDLE SPNL 18GA L3.5IN W/ QNCKE SHARPER BVL DURA CLICK

## (undated) DEVICE — CLEANER,CAUTERY TIP,2X2",STERILE: Brand: MEDLINE

## (undated) DEVICE — CONTAINER,SPECIMEN,4OZ,OR STRL: Brand: MEDLINE

## (undated) DEVICE — DRAPE,REIN 53X77,STERILE: Brand: MEDLINE

## (undated) DEVICE — SUTURE ABSORBABLE MONOFILAMENT 1 CTX 36 CM 48 MM VIO PDS +

## (undated) DEVICE — SYRINGE MED 30ML STD CLR PLAS LUERLOCK TIP N CTRL DISP

## (undated) DEVICE — SUTURE VICRYL ABSRB BRAID COAT UD CP NO 2 27IN  J195H

## (undated) DEVICE — Device: Brand: SNAP ON SPHERZ

## (undated) DEVICE — GOWN,SIRUS,NONRNF,SETINSLV,2XL,18/CS: Brand: MEDLINE

## (undated) DEVICE — SUTURE VICRYL SZ 0 L36IN ABSRB UD L36MM CT-1 1/2 CIR J946H

## (undated) DEVICE — SUTURE PROL SZ 4-0 L36IN NONABSORBABLE BLU L26MM SH 1/2 CIR 8521H

## (undated) DEVICE — C-ARMOR C-ARM EQUIPMENT COVERS CLEAR STERILE UNIVERSAL FIT 12 PER CASE: Brand: C-ARMOR

## (undated) DEVICE — 450 ML BOTTLE OF 0.05% CHLORHEXIDINE GLUCONATE IN 99.95% STERILE WATER FOR IRRIGATION, USP AND APPLICATOR.: Brand: IRRISEPT ANTIMICROBIAL WOUND LAVAGE

## (undated) DEVICE — 1LYRTR 16FR10ML100%SIL UMS SNP: Brand: MEDLINE INDUSTRIES, INC.

## (undated) DEVICE — KIT AUTOTRNS CELL SAVR W/ BOWL CELL SAVR

## (undated) DEVICE — PACK ANTICOAG LINE LN208

## (undated) DEVICE — MARKER,SKIN,WI/RULER AND LABELS: Brand: MEDLINE

## (undated) DEVICE — 3.2MM X 18.3MM METAL CUTTING HELIOCOIDAL RASP

## (undated) DEVICE — SYRINGE MED 10ML LUERLOCK TIP W/O SFTY DISP

## (undated) DEVICE — COVER,TABLE,HEAVY DUTY,50"X90",STRL: Brand: MEDLINE

## (undated) DEVICE — TOWEL,OR,DSP,ST,NATURAL,DLX,4/PK,20PK/CS: Brand: MEDLINE

## (undated) DEVICE — CORD ES L12FT BPLR FRCP

## (undated) DEVICE — LIQUIBAND RAPID ADHESIVE 36/CS 0.8ML: Brand: MEDLINE

## (undated) DEVICE — SUTURE STRATAFIX SPRL SZ 3-0 L5IN ABSRB UD FS L26MM 3/8 CIR SXMP2B411

## (undated) DEVICE — GARMENT,MEDLINE,DVT,INT,CALF,MED, GEN2: Brand: MEDLINE

## (undated) DEVICE — GAUZE,SPONGE,FLUFF,6"X6.75",STRL,5/TRAY: Brand: MEDLINE